# Patient Record
Sex: FEMALE | Race: BLACK OR AFRICAN AMERICAN | NOT HISPANIC OR LATINO | Employment: FULL TIME | URBAN - METROPOLITAN AREA
[De-identification: names, ages, dates, MRNs, and addresses within clinical notes are randomized per-mention and may not be internally consistent; named-entity substitution may affect disease eponyms.]

---

## 2017-03-06 ENCOUNTER — OFFICE VISIT (OUTPATIENT)
Dept: FAMILY MEDICINE | Facility: CLINIC | Age: 32
End: 2017-03-06
Payer: COMMERCIAL

## 2017-03-06 VITALS
HEART RATE: 60 BPM | SYSTOLIC BLOOD PRESSURE: 104 MMHG | OXYGEN SATURATION: 100 % | BODY MASS INDEX: 28.82 KG/M2 | WEIGHT: 168.8 LBS | TEMPERATURE: 98.3 F | DIASTOLIC BLOOD PRESSURE: 63 MMHG | HEIGHT: 64 IN

## 2017-03-06 DIAGNOSIS — Z00.00 ENCOUNTER FOR ROUTINE ADULT HEALTH EXAMINATION WITHOUT ABNORMAL FINDINGS: Primary | ICD-10-CM

## 2017-03-06 LAB
ERYTHROCYTE [DISTWIDTH] IN BLOOD BY AUTOMATED COUNT: 14.2 % (ref 10–15)
HCT VFR BLD AUTO: 38 % (ref 35–47)
HGB BLD-MCNC: 12.4 G/DL (ref 11.7–15.7)
MCH RBC QN AUTO: 29.2 PG (ref 26.5–33)
MCHC RBC AUTO-ENTMCNC: 32.6 G/DL (ref 31.5–36.5)
MCV RBC AUTO: 90 FL (ref 78–100)
PLATELET # BLD AUTO: 219 10E9/L (ref 150–450)
RBC # BLD AUTO: 4.24 10E12/L (ref 3.8–5.2)
TSH SERPL DL<=0.005 MIU/L-ACNC: 1.22 MU/L (ref 0.4–4)
WBC # BLD AUTO: 6.4 10E9/L (ref 4–11)

## 2017-03-06 PROCEDURE — 84443 ASSAY THYROID STIM HORMONE: CPT | Performed by: FAMILY MEDICINE

## 2017-03-06 PROCEDURE — 36415 COLL VENOUS BLD VENIPUNCTURE: CPT | Performed by: FAMILY MEDICINE

## 2017-03-06 PROCEDURE — 85027 COMPLETE CBC AUTOMATED: CPT | Performed by: FAMILY MEDICINE

## 2017-03-06 PROCEDURE — 99395 PREV VISIT EST AGE 18-39: CPT | Performed by: FAMILY MEDICINE

## 2017-03-06 ASSESSMENT — PAIN SCALES - GENERAL: PAINLEVEL: MODERATE PAIN (4)

## 2017-03-06 NOTE — NURSING NOTE
"Chief Complaint   Patient presents with     Physical       Initial /63 (BP Location: Left arm, Patient Position: Chair, Cuff Size: Adult Regular)  Pulse 60  Temp 98.3  F (36.8  C) (Oral)  Ht 5' 3.9\" (1.623 m)  Wt 168 lb 12.8 oz (76.6 kg)  LMP 02/21/2017 (Exact Date)  SpO2 100%  Breastfeeding? No  BMI 29.07 kg/m2 Estimated body mass index is 29.07 kg/(m^2) as calculated from the following:    Height as of this encounter: 5' 3.9\" (1.623 m).    Weight as of this encounter: 168 lb 12.8 oz (76.6 kg).  Medication Reconciliation: complete     COMFORT Howard MA      "

## 2017-03-06 NOTE — MR AVS SNAPSHOT
After Visit Summary   3/6/2017    Ebony Jo    MRN: [id number]           Patient Information     Date Of Birth          1985        Visit Information        Provider Department      3/6/2017 2:00 PM Desire Dumont MD [facility]        Today's Diagnoses     Encounter for routine adult health examination without abnormal findings    -  1      Care Instructions    Based on your medical history and these are the current health maintenance or preventive care services that you are due for (some may have been done at this visit)  Health Maintenance Due   Topic Date Due     MIGRAINE ACTION PLAN,ONE TIME,NO INBASKET  05/04/2001         At [facility], we strive to deliver an exceptional experience to you, every time we see you.    If you receive a survey in the mail, please send us back your thoughts. We really do value your feedback.    Your care team's suggested websites for health information:  Www.[employer] : Up to date and easily searchable information on multiple topics.  Www.medlineplus.gov : medication info, interactive tutorials, watch real surgeries online  Www.familydoctor.org : good info from the Academy of Family Physicians  Www.cdc.gov : public health info, travel advisories, epidemics (H1N1)  Www.aap.org : children's health info, normal development, vaccinations  Www.health.[facility].[place].us : [place] dept of health, public health issues in [place], N1N1    How to contact your care team:   Team Cristina/Mukund [phone]         Pharmacy [phone]    Dr. Huston, Tracee Porras PA-C, Jami Del Cid APRN CNP, Kalyani Alexander PA-C, Dr. Begum, and JULIANNA Delacruz CNP    Team RNs: Xiomara & Starr      Clinic hours  M-Th 7 am-7 pm   Fri 7 am-5 pm.   Urgent care M-F 11 am-9 pm,   Sat/Sun 9 am-5 pm.  Pharmacy M-Th 8 am-8 pm Fri 8 am-6 pm  Sat/Sun 9 am-5 pm.     All password changes, disabled accounts, or ID changes in  MyChart/MyHealth will be done by our Access Services Department.    If you need help with your account or password, call: [phone]. Clinic staff no longer has the ability to change passwords.     Preventive Health Recommendations  Female Ages 26 - 39  Yearly exam:   See your health care provider every year in order to    Review health changes.     Discuss preventive care.      Review your medicines if you your doctor has prescribed any.    Until age 30: Get a Pap test every three years (more often if you have had an abnormal result).    After age 30: Talk to your doctor about whether you should have a Pap test every 3 years or have a Pap test with HPV screening every 5 years.   You do not need a Pap test if your uterus was removed (hysterectomy) and you have not had cancer.  You should be tested each year for STDs (sexually transmitted diseases), if you're at risk.   Talk to your provider about how often to have your cholesterol checked.  If you are at risk for diabetes, you should have a diabetes test (fasting glucose).  Shots: Get a flu shot each year. Get a tetanus shot every 10 years.   Nutrition:     Eat at least 5 servings of fruits and vegetables each day.    Eat whole-grain bread, whole-wheat pasta and brown rice instead of white grains and rice.    Talk to your provider about Calcium and Vitamin D.     Lifestyle    Exercise at least 150 minutes a week (30 minutes a day, 5 days of the week). This will help you control your weight and prevent disease.    Limit alcohol to one drink per day.    No smoking.     Wear sunscreen to prevent skin cancer.    See your dentist every six months for an exam and cleaning.          Follow-ups after your visit        Your next 10 appointments already scheduled     Apr 05, 2017 11:00 AM CDT   Office Visit with Floyd Corcoran MD   [facility] ([facility]    [address] [address] [place] [id number] [phone]            "Bring a current list of meds and any records pertaining to this visit.  For Physicals, please bring immunization records and any forms needing to be filled out.  Please arrive 10 minutes early to complete paperwork.              Who to contact     If you have questions or need follow up information about today's clinic visit or your schedule please contact [facility] directly at [phone].  Normal or non-critical lab and imaging results will be communicated to you by [employer], letter or phone within 4 business days after the clinic has received the results. If you do not hear from us within 7 days, please contact the clinic through [employer] or phone. If you have a critical or abnormal lab result, we will notify you by phone as soon as possible.  Submit refill requests through [employer] or call your pharmacy and they will forward the refill request to us. Please allow 3 business days for your refill to be completed.          Additional Information About Your Visit        [employer] Information     [employer] gives you secure access to your electronic health record. If you see a primary care provider, you can also send messages to your care team and make appointments. If you have questions, please call your primary care clinic.  If you do not have a primary care provider, please call [phone] and they will assist you.        Care EveryWhere ID     This is your Care EveryWhere ID. This could be used by other organizations to access your [facility] medical records  [id number]        Your Vitals Were     Pulse Temperature Height Last Period Pulse Oximetry Breastfeeding?    60 98.3  F (36.8  C) (Oral) 5' 3.9\" (1.623 m) 02/21/2017 (Exact Date) 100% No    BMI (Body Mass Index)                   29.07 kg/m2            Blood Pressure from Last 3 Encounters:   03/06/17 104/63   11/08/16 105/67   08/05/16 112/73    Weight from Last 3 Encounters:   03/06/17 168 lb 12.8 oz (76.6 kg)   11/08/16 173 lb 6.4 oz (78.7 " kg)   08/05/16 170 lb (77.1 kg)              We Performed the Following     CBC with platelets     TSH with free T4 reflex        Primary Care Provider Office Phone # Fax #    Desire Elis Waldron [phone]       [facility] [address]  [facility] [address]        Thank you!     Thank you for choosing [facility]  for your care. Our goal is always to provide you with excellent care. Hearing back from our patients is one way we can continue to improve our services. Please take a few minutes to complete the written survey that you may receive in the mail after your visit with us. Thank you!             Your Updated Medication List - Protect others around you: Learn how to safely use, store and throw away your medicines at www.disposemymeds.org.          This list is accurate as of: 3/6/17  3:44 PM.  Always use your most recent med list.                   Brand Name Dispense Instructions for use    ADVIL PO      Reported on 3/6/2017

## 2017-03-06 NOTE — PATIENT INSTRUCTIONS
Based on your medical history and these are the current health maintenance or preventive care services that you are due for (some may have been done at this visit)  Health Maintenance Due   Topic Date Due     MIGRAINE ACTION PLAN,ONE TIME,NO INBASKET  05/04/2001         At [facility], we strive to deliver an exceptional experience to you, every time we see you.    If you receive a survey in the mail, please send us back your thoughts. We really do value your feedback.    Your care team's suggested websites for health information:  Www.[facility].org : Up to date and easily searchable information on multiple topics.  Www.medlineplus.gov : medication info, interactive tutorials, watch real surgeries online  Www.familydoctor.org : good info from the Academy of Family Physicians  Www.cdc.gov : public health info, travel advisories, epidemics (H1N1)  Www.aap.org : children's health info, normal development, vaccinations  Www.health.[facility].[place].us : [place] dept of health, public health issues in [place], N1N1    How to contact your care team:   Team Cristina/Spirit [phone]         Pharmacy [phone]    Dr. Huston, Tracee Porras PA-C, Dr. Lua, Jami LEVINE CNP, Kalyani Alexander PA-C, Dr. Begum, and JULIANNA Delacruz CNP    Team RNs: Xiomara & Starr      Clinic hours  M-Th 7 am-7 pm   Fri 7 am-5 pm.   Urgent care M-F 11 am-9 pm,   Sat/Sun 9 am-5 pm.  Pharmacy M-Th 8 am-8 pm Fri 8 am-6 pm  Sat/Sun 9 am-5 pm.     All password changes, disabled accounts, or ID changes in [employer]/MyHealth will be done by our Access Services Department.    If you need help with your account or password, call: [phone]. Clinic staff no longer has the ability to change passwords.     Preventive Health Recommendations  Female Ages 26 - 39  Yearly exam:   See your health care provider every year in order to    Review health changes.     Discuss preventive care.      Review your medicines if you your doctor  has prescribed any.    Until age 30: Get a Pap test every three years (more often if you have had an abnormal result).    After age 30: Talk to your doctor about whether you should have a Pap test every 3 years or have a Pap test with HPV screening every 5 years.   You do not need a Pap test if your uterus was removed (hysterectomy) and you have not had cancer.  You should be tested each year for STDs (sexually transmitted diseases), if you're at risk.   Talk to your provider about how often to have your cholesterol checked.  If you are at risk for diabetes, you should have a diabetes test (fasting glucose).  Shots: Get a flu shot each year. Get a tetanus shot every 10 years.   Nutrition:     Eat at least 5 servings of fruits and vegetables each day.    Eat whole-grain bread, whole-wheat pasta and brown rice instead of white grains and rice.    Talk to your provider about Calcium and Vitamin D.     Lifestyle    Exercise at least 150 minutes a week (30 minutes a day, 5 days of the week). This will help you control your weight and prevent disease.    Limit alcohol to one drink per day.    No smoking.     Wear sunscreen to prevent skin cancer.    See your dentist every six months for an exam and cleaning.

## 2017-03-06 NOTE — PROGRESS NOTES
SUBJECTIVE:     CC: Ebony Jo is an 31 year old woman who presents for preventive health visit.     Answers for HPI/ROS submitted by the patient on 3/6/2017   Annual Exam:  Getting at least 3 servings of Calcium per day:: Yes  Bi-annual eye exam:: NO  Dental care twice a year:: NO  Sleep apnea or symptoms of sleep apnea:: None  Diet:: Regular (no restrictions)  Frequency of exercise:: 1 day/week  Taking medications regularly:: Yes  Medication side effects:: None  Additional concerns today:: No  Q1: Little interest or pleasure in doing things: 1=Several days  Q2: Feeling down, depressed or hopeless: 3=Nearly every day  PHQ-2 Score: 4  Duration of exercise:: 15-30 minutes    Healthy Habits:    Do you get at least three servings of calcium containing foods daily (dairy, green leafy vegetables, etc.)? yes    Amount of exercise or daily activities, outside of work: 1 day(s) per week    Problems taking medications regularly No    Medication side effects: No    Have you had an eye exam in the past two years? no    Do you see a dentist twice per year? no    Do you have sleep apnea, excessive snoring or daytime drowsiness?no    Body aches with low grade temps off and on for 1 week.  Exercises once weekly.  Having some stress and headaches. Going through a divorce as week.    Skin concerns.    Today's PHQ-2 Score:   PHQ-2 ( 1999 Pfizer) 3/6/2017 1/27/2016   Q1: Little interest or pleasure in doing things - 0   Q2: Feeling down, depressed or hopeless - 0   PHQ-2 Score - 0   Little interest or pleasure in doing things Several days -   Feeling down, depressed or hopeless Nearly every day -   PHQ-2 Score 4 -       Abuse: Current or Past(Physical, Sexual or Emotional)- No  Do you feel safe in your environment - Yes    Social History   Substance Use Topics     Smoking status: Never Smoker     Smokeless tobacco: Never Used     Alcohol use 0.0 oz/week     0 Standard drinks or equivalent per week      Comment:  0-2  wine  glasses  "monthly     The patient does not drink >3 drinks per day nor >7 drinks per week.    Recent Labs   Lab Test  01/27/16   1750  10/14/11   0922   CHOL   --   119   HDL   --   48*   LDL  58  55   TRIG   --   76   CHOLHDLRATIO   --   2.5       Reviewed orders with patient.  Reviewed health maintenance and updated orders accordingly - Yes    Mammo Decision Support:  Mammogram not appropriate for this patient based on age.    Pertinent mammograms are reviewed under the imaging tab.  History of abnormal Pap smear: NO - age 30- 65 PAP every 3 years recommended    Reviewed and updated as needed this visit by clinical staff  Tobacco  Allergies  Meds  Soc Hx        Reviewed and updated as needed this visit by Provider            ROS:  C: NEGATIVE for fever, chills, change in weight  I: NEGATIVE for worrisome rashes, moles or lesions  E: NEGATIVE for vision changes or irritation  ENT: NEGATIVE for ear, mouth and throat problems  R: NEGATIVE for significant cough or SOB  B: NEGATIVE for masses, tenderness or discharge  CV: NEGATIVE for chest pain, palpitations or peripheral edema  GI: NEGATIVE for nausea, abdominal pain, heartburn, or change in bowel habits  : NEGATIVE for unusual urinary or vaginal symptoms. Periods are regular.  M: NEGATIVE for significant arthralgias or myalgia  N: NEGATIVE for weakness, dizziness or paresthesias  P: NEGATIVE for changes in mood or affect    Problem list, Medication list, Allergies, and Medical/Social/Surgical histories reviewed in EPIC and updated as appropriate.  OBJECTIVE:     /63 (BP Location: Left arm, Patient Position: Chair, Cuff Size: Adult Regular)  Pulse 60  Temp 98.3  F (36.8  C) (Oral)  Ht 5' 3.9\" (1.623 m)  Wt 168 lb 12.8 oz (76.6 kg)  LMP 02/21/2017 (Exact Date)  SpO2 100%  Breastfeeding? No  BMI 29.07 kg/m2  EXAM:  GENERAL: healthy, alert and no distress  EYES: Eyes grossly normal to inspection, PERRL and conjunctivae and sclerae normal  HENT: ear canals and TM's " "normal, nose and mouth without ulcers or lesions  NECK: no adenopathy, no asymmetry, masses, or scars and thyroid normal to palpation  RESP: lungs clear to auscultation - no rales, rhonchi or wheezes  BREAST: normal without masses, tenderness or nipple discharge and no palpable axillary masses or adenopathy  CV: regular rate and rhythm, normal S1 S2, no S3 or S4, no murmur, click or rub, no peripheral edema and peripheral pulses strong  ABDOMEN: soft, nontender, no hepatosplenomegaly, no masses and bowel sounds normal  MS: no gross musculoskeletal defects noted, no edema  SKIN: no suspicious lesions or rashes  NEURO: Normal strength and tone, mentation intact and speech normal  PSYCH: mentation appears normal, affect normal/bright    ASSESSMENT/PLAN:     1. Encounter for routine adult health examination without abnormal findings  Routine preventive discussed - recommended stress reduction, increased exercise and commitment to take better care of herself.  - CBC with platelets  - TSH with free T4 reflex    COUNSELING:   Reviewed preventive health counseling, as reflected in patient instructions         reports that she has never smoked. She has never used smokeless tobacco.    Estimated body mass index is 29.07 kg/(m^2) as calculated from the following:    Height as of this encounter: 5' 3.9\" (1.623 m).    Weight as of this encounter: 168 lb 12.8 oz (76.6 kg).   Weight management plan: Discussed healthy diet and exercise guidelines and patient will follow up in 12 months in clinic to re-evaluate.    Counseling Resources:  ATP IV Guidelines  Pooled Cohorts Equation Calculator  Breast Cancer Risk Calculator  FRAX Risk Assessment  ICSI Preventive Guidelines  Dietary Guidelines for Americans, 2010  USDA's MyPlate  ASA Prophylaxis  Lung CA Screening    Desire Waldron MD  [facility]      "

## 2017-03-07 NOTE — PROGRESS NOTES
Ms. Jo,    All of your labs were normal for you.    Please contact the clinic if you have additional questions.  Thank you.    Sincerely,    Desire Waldron

## 2017-04-21 ENCOUNTER — OFFICE VISIT (OUTPATIENT)
Dept: URGENT CARE | Facility: URGENT CARE | Age: 32
End: 2017-04-21
Payer: COMMERCIAL

## 2017-04-21 VITALS
SYSTOLIC BLOOD PRESSURE: 97 MMHG | DIASTOLIC BLOOD PRESSURE: 66 MMHG | HEART RATE: 62 BPM | TEMPERATURE: 97.7 F | OXYGEN SATURATION: 100 % | BODY MASS INDEX: 28.76 KG/M2 | WEIGHT: 167 LBS

## 2017-04-21 DIAGNOSIS — M62.81 GENERALIZED MUSCLE WEAKNESS: ICD-10-CM

## 2017-04-21 DIAGNOSIS — R42 DIZZINESS: Primary | ICD-10-CM

## 2017-04-21 PROCEDURE — 99213 OFFICE O/P EST LOW 20 MIN: CPT | Performed by: NURSE PRACTITIONER

## 2017-04-21 NOTE — PROGRESS NOTES
SUBJECTIVE:                                                    Ebony Jo is a 31 year old female who presents to clinic today for the following health issues:    Dizziness      Duration: 1-2 weeks    Description   Feeling faint:  Not right not, but when she stands up she feels more faint.   Feeling like the surroundings are moving: no     Intensity:  moderate    Accompanying signs and symptoms:  Pt also has body aches and feels so weak and fatigued that all she wants to do is sleep. She has had to steady herself to walk at times.   Nausea/vomitting: no   Palpitations: YES at times but not currently.   Weakness in arms or legs: YES  Vision or speech changes: no   Ringing in ears (Tinnitus): no   Hearing loss related to dizziness: no   Other (fevers/chills/sweating/dyspnea) Feels feverish.     History (similar episodes/head trauma/previous evaluation/recent bleeding): None    Precipitating or alleviating factors (new meds/chemicals): None  Worse with activity/head movement: YES    Therapies tried and outcome: None       No Known Allergies    Past Medical History:   Diagnosis Date     Hemifacial spasm, right 9/26/2013     Malaria      Migraine without aura 9/26/2013     Seasonal allergies          Current Outpatient Prescriptions on File Prior to Visit:  Ibuprofen (ADVIL PO) Reported on 3/6/2017     No current facility-administered medications on file prior to visit.     Social History   Substance Use Topics     Smoking status: Never Smoker     Smokeless tobacco: Never Used     Alcohol use 0.0 oz/week     0 Standard drinks or equivalent per week      Comment:  0-2  wine  glasses monthly       ROS:  GEN no fevers  SKIN as above  Musculoskel: + as above    OBJECTIVE:  BP 97/66 (BP Location: Left arm, Patient Position: Chair, Cuff Size: Adult Regular)  Pulse 62  Temp 97.7  F (36.5  C) (Oral)  Wt 167 lb (75.8 kg)  SpO2 100%  Breastfeeding? No  BMI 28.76 kg/m2   General:   awake, alert, and cooperative.  NAD.    Head: Normocephalic, atraumatic.  .resp: Air entry equal to all lung fields. Normal breath sounds  Heart: Heart sounds normal, no murmurs, clicks or any other abnormal sounds.   Eyes: Conjunctiva clear,   MS:  Both passive and active ROM intact. Pulses and sensation intact in all extremities  Neuro: Alert and oriented - normal speech.    ASSESSMENT:    ICD-10-CM    1. Dizziness R42    2. Generalized muscle weakness M62.81      Patient had a physical 1 month ago. All labs were unremarkable. Her menstrual period has not changes, she reports normal flow of 3 days.  Advised on healthy diet and exercise, keep a healthy weight. Follow up with PCP if dizziness is persisting.  The patient indicates understanding of these issues and agrees with the plan.  Sarah Antoine  [facility]  Family Nurse Practitoner

## 2017-04-21 NOTE — MR AVS SNAPSHOT
After Visit Summary   4/21/2017    Ebony Jo    MRN: [id number]           Patient Information     Date Of Birth          1985        Visit Information        Provider Department      4/21/2017 11:15 AM Sarah Antoine NP [facility]        Today's Diagnoses     Dizziness    -  1    Generalized muscle weakness           Follow-ups after your visit        Follow-up notes from your care team     Return if symptoms worsen or fail to improve.      Who to contact     If you have questions or need follow up information about today's clinic visit or your schedule please contact [facility] directly at [phone].  Normal or non-critical lab and imaging results will be communicated to you by [employer], letter or phone within 4 business days after the clinic has received the results. If you do not hear from us within 7 days, please contact the clinic through [employer] or phone. If you have a critical or abnormal lab result, we will notify you by phone as soon as possible.  Submit refill requests through [employer] or call your pharmacy and they will forward the refill request to us. Please allow 3 business days for your refill to be completed.          Additional Information About Your Visit        MyChart Information     [employer] gives you secure access to your electronic health record. If you see a primary care provider, you can also send messages to your care team and make appointments. If you have questions, please call your primary care clinic.  If you do not have a primary care provider, please call [phone] and they will assist you.        Care EveryWhere ID     This is your Care EveryWhere ID. This could be used by other organizations to access your [facility] medical records  [id number]        Your Vitals Were     Pulse Temperature Pulse Oximetry Breastfeeding? BMI (Body Mass Index)       62 97.7  F (36.5  C) (Oral) 100% No 28.76 kg/m2        Blood Pressure from  Last 3 Encounters:   04/21/17 97/66   03/06/17 104/63   11/08/16 105/67    Weight from Last 3 Encounters:   04/21/17 167 lb (75.8 kg)   03/06/17 168 lb 12.8 oz (76.6 kg)   11/08/16 173 lb 6.4 oz (78.7 kg)              Today, you had the following     No orders found for display       Primary Care Provider Office Phone # Fax #    Desire Eliswilliam Waldron [phone]       [facility] [address] [place]  [facility] [address]        Thank you!     Thank you for choosing [facility]  for your care. Our goal is always to provide you with excellent care. Hearing back from our patients is one way we can continue to improve our services. Please take a few minutes to complete the written survey that you may receive in the mail after your visit with us. Thank you!             Your Updated Medication List - Protect others around you: Learn how to safely use, store and throw away your medicines at www.disposemymeds.org.          This list is accurate as of: 4/21/17  1:01 PM.  Always use your most recent med list.                   Brand Name Dispense Instructions for use    ADVIL PO      Reported on 3/6/2017

## 2017-04-21 NOTE — NURSING NOTE
"Chief Complaint   Patient presents with     Dizziness     Pt c/o dizziness episodes that have been going on for the past 1-2 weeks.        Initial BP 97/66 (BP Location: Left arm, Patient Position: Chair, Cuff Size: Adult Regular)  Pulse 62  Temp 97.7  F (36.5  C) (Oral)  Wt 167 lb (75.8 kg)  SpO2 100%  Breastfeeding? No  BMI 28.76 kg/m2 Estimated body mass index is 28.76 kg/(m^2) as calculated from the following:    Height as of 3/6/17: 5' 3.9\" (1.623 m).    Weight as of this encounter: 167 lb (75.8 kg).  Medication Reconciliation: complete     Sangeetha Merritt CMA (AAMA)      "

## 2017-09-27 ENCOUNTER — OFFICE VISIT (OUTPATIENT)
Dept: OBGYN | Facility: CLINIC | Age: 32
End: 2017-09-27
Payer: COMMERCIAL

## 2017-09-27 VITALS
DIASTOLIC BLOOD PRESSURE: 63 MMHG | SYSTOLIC BLOOD PRESSURE: 99 MMHG | TEMPERATURE: 98.3 F | OXYGEN SATURATION: 100 % | HEART RATE: 64 BPM | WEIGHT: 167.1 LBS | BODY MASS INDEX: 28.77 KG/M2

## 2017-09-27 DIAGNOSIS — R10.2 FEMALE PELVIC PAIN: Primary | ICD-10-CM

## 2017-09-27 DIAGNOSIS — N92.6 IRREGULAR MENSES: ICD-10-CM

## 2017-09-27 PROCEDURE — 99204 OFFICE O/P NEW MOD 45 MIN: CPT | Performed by: OBSTETRICS & GYNECOLOGY

## 2017-09-27 RX ORDER — PRENATAL VIT/IRON FUM/FOLIC AC 27MG-0.8MG
1 TABLET ORAL DAILY
COMMUNITY
End: 2018-05-31

## 2017-09-27 NOTE — NURSING NOTE
"Chief Complaint   Patient presents with     Consult     Abdominal Pain       Initial BP 99/63  Pulse 64  Temp 98.3  F (36.8  C) (Oral)  Wt 75.8 kg (167 lb 1.6 oz)  LMP 09/26/2017  SpO2 100%  BMI 28.77 kg/m2 Estimated body mass index is 28.77 kg/(m^2) as calculated from the following:    Height as of 3/6/17: 1.623 m (5' 3.9\").    Weight as of this encounter: 75.8 kg (167 lb 1.6 oz).  Medication Reconciliation: complete   Shari Caldera CMA      "

## 2017-09-27 NOTE — MR AVS SNAPSHOT
After Visit Summary   9/27/2017    Ebony Jo    MRN: [id number]           Patient Information     Date Of Birth          1985        Visit Information        Provider Department      9/27/2017 2:00 PM Floyd Corcoran MD [facility]        Today's Diagnoses     Female pelvic pain    -  1      Care Instructions                                                         If you have any questions regarding your visit, Please contact your care team.    Women s Health CLINIC HOURS TELEPHONE NUMBER   MD Shari Piper CMA Lisa -    MATTHEW Reyna RN       Monday:       7:30-4:30 [place]  Wednesday:       7:30-4:30 [place]  Thursday:       7:30-1:30 [place]  Friday:       7:30-11:30 [facility]  [address] [facility] [place], [place]  [id number] [phone] ask for [facility]  [address] [place] [address]  [phone] ask for [facility]    Imaging Scheduling for [place]:  [phone]    Imaging Scheduling for [place]: [phone]       Urgent Care locations:    [facility] Saturday and Sunday   9 am - 5 pm    Monday-Friday   12 pm - 8 pm  Saturday and Sunday   9 am - 5 pm   [phone]     [facility] Labor and Delivery:  [phone]    If you need a medication refill, please contact your pharmacy. Please allow 3 business days for your refill to be completed.  As always, Thank you for trusting us with your healthcare needs!              Follow-ups after your visit        Future tests that were ordered for you today     Open Future Orders        Priority Expected Expires Ordered    US Pelvic Complete with Transvaginal ASAP 9/27/2017 11/26/2017 9/27/2017    Estradiol Routine 9/27/2017 11/30/2017 9/27/2017    Lutropin Routine 9/27/2017 11/30/2017 9/27/2017    Prolactin Routine 9/27/2017 11/30/2017 9/27/2017    Testosterone  Free and Total Routine 9/27/2017 11/30/2017 9/27/2017    TSH with free T4 reflex Routine 9/27/2017 11/30/2017 9/27/2017            Who to contact     If you have questions or need follow up information about today's clinic visit or your schedule please contact [facility] directly at [phone].  Normal or non-critical lab and imaging results will be communicated to you by MyChart, letter or phone within 4 business days after the clinic has received the results. If you do not hear from us within 7 days, please contact the clinic through [employer] or phone. If you have a critical or abnormal lab result, we will notify you by phone as soon as possible.  Submit refill requests through [employer] or call your pharmacy and they will forward the refill request to us. Please allow 3 business days for your refill to be completed.          Additional Information About Your Visit        [employer] Information     [employer] gives you secure access to your electronic health record. If you see a primary care provider, you can also send messages to your care team and make appointments. If you have questions, please call your primary care clinic.  If you do not have a primary care provider, please call [phone] and they will assist you.        Care EveryWhere ID     This is your Care EveryWhere ID. This could be used by other organizations to access your [place] medical records  [id number]        Your Vitals Were     Pulse Temperature Last Period Pulse Oximetry BMI (Body Mass Index)       64 98.3  F (36.8  C) (Oral) 09/26/2017 100% 28.77 kg/m2        Blood Pressure from Last 3 Encounters:   09/27/17 99/63   04/21/17 97/66   03/06/17 104/63    Weight from Last 3 Encounters:   09/27/17 75.8 kg (167 lb 1.6 oz)   04/21/17 75.8 kg (167 lb)   03/06/17 76.6 kg (168 lb 12.8 oz)               Primary Care Provider Office Phone # Fax #    Desire Elis Waldron [phone]       [address]  [place] [address]        Equal Access to Services     BRIDGET BARTLETT : Hadii aad ku hadreillyswapna Vasquez, waisaacda jonathan, qaamycori garciamahenrry lr, agapito cabavilmaservando joshi. So [facility] [phone].    ATENCIÓN: Si habla español, tiene a french disposición servicios gratuitos de asistencia lingüística. Llame al [phone].    We comply with applicable federal civil rights laws and [place] laws. We do not discriminate on the basis of race, color, national origin, age, disability sex, sexual orientation or gender identity.            Thank you!     Thank you for choosing [facility]  for your care. Our goal is always to provide you with excellent care. Hearing back from our patients is one way we can continue to improve our services. Please take a few minutes to complete the written survey that you may receive in the mail after your visit with us. Thank you!             Your Updated Medication List - Protect others around you: Learn how to safely use, store and throw away your medicines at www.disposemymeds.org.          This list is accurate as of: 9/27/17  2:48 PM.  Always use your most recent med list.                   Brand Name Dispense Instructions for use Diagnosis    ADVIL PO      Reported on 3/6/2017        prenatal multivitamin plus iron 27-0.8 MG Tabs per tablet      Take 1 tablet by mouth daily

## 2017-09-27 NOTE — PATIENT INSTRUCTIONS
If you have any questions regarding your visit, Please contact your care team.    Women s Health CLINIC HOURS TELEPHONE NUMBER   MD Shari Piper CMA Lisa -    MATTHEW Reyna RN       Monday:       7:30-4:30 [place]  Wednesday:       7:30-4:30 [place]  Thursday:       7:30-1:30 [place]  Friday:       7:30-11:30 [facility]  [address] [place]  [address]  [phone] ask for [facility]  [address] [place] [address]  [phone] ask for [facility]    Imaging Scheduling for [place]:  [phone]    Imaging Scheduling for [place]: [phone]       Urgent Care locations:    [facility] Saturday and Sunday   9 am - 5 pm    Monday-Friday   12 pm - 8 pm  Saturday and Sunday   9 am - 5 pm   [phone]     [facility] Labor and Delivery:  [phone]    If you need a medication refill, please contact your pharmacy. Please allow 3 business days for your refill to be completed.  As always, Thank you for trusting us with your healthcare needs!

## 2017-09-27 NOTE — PROGRESS NOTES
Ebony is a 32 year old   is here today complaining of RLQ pain.  This has been a problem many years.  The pain occurs every couple of months, lasting for a few days each time.  The pain is cramping.  While her cycles are regular, the pain isn't cyclic or consistent to a time of her cycle.  She reports normal bowel/bladder function.  Denies pain or bleeding with intercourse.   She does want to conceive.  No urinary frequency or dysuria, bladder or kidney problems, Negative for painful menses, irregular menses, heavy periods, vaginal discharge, vaginal itching or burning, dysparunia  She also reports irregularity in her cycle.  ROS: Ten point review of systems was reviewed and negative except the above.    Gyn Hx:      Past Medical History:   Diagnosis Date     Hemifacial spasm, right 2013     Malaria      Migraine without aura 2013     Seasonal allergies      Past Surgical History:   Procedure Laterality Date     APPENDECTOMY       Patient Active Problem List   Diagnosis     CARDIOVASCULAR SCREENING; LDL GOAL LESS THAN 160     Migraine without aura     Hemifacial spasm, right       ALL/Meds: Her medication and allergy histories were reviewed and are documented in their appropriate chart areas.    SH: Reviewed and documented in the appropriate area of the chart.  FH:  Her family history is reviewed and updated in the chart, today.  PMH: Her past medical, surgical, and obstetric histories were reviewed and updated today in the appropriate chart areas.    PE: BP 99/63  Pulse 64  Temp 98.3  F (36.8  C) (Oral)  Wt 75.8 kg (167 lb 1.6 oz)  LMP 2017  SpO2 100%  BMI 28.77 kg/m2  Body mass index is 28.77 kg/(m^2).    General Appearance:  healthy, alert, active, no distress  Cardiovascular:  Regular rate and Rhythm  Neck: Supple, no adenopathy and thyroid normal  Lungs:  Clear, without wheeze, rale or rhonchi  Breast: normal breast exam  Abdomen: Benign, Soft, flat, non-tender, No masses,  organomegaly, No inguinal nodes and Bowel sounds normoactive.   Pelvic:       - Ext: Vulva and perineum are normal without lesion, mass or discharge        - Urethra: normal without discharge or scarring no hypermobility       - Urethral Meatus: normal appearance,        - Bladder: no tenderness, no masses       - Vagina:  without discharge and rugated       - Cervix: normal       - Uterus:Normal shape, position and consistency       - Adnexa: Normal without masses or tenderness       - Rectal: deferred    I don't think this pain is gynecologic.  It is most likely related to scar tissue from her previous appendectomy.  We will get an ultrasound to assess the adnexa better.      A/P:(R10.2) Female pelvic pain  (primary encounter diagnosis)    Plan: US Pelvic Complete with Transvaginal,         Estradiol, Lutropin, Prolactin, Testosterone         Free and Total, TSH with free T4 reflex        20 minutes was spent face to face with the patient today discussing her history, diagnosis, and follow-up plan as noted above.  Over 50% of the visit was spent in counseling and coordination of care.    Total Visit Time: 30 minutes.      She will follow up after the testing is done.  At that time, we can discuss what options she has to help her conceive.   - No orders of the defined types were placed in this encounter.

## 2017-10-03 ENCOUNTER — RADIANT APPOINTMENT (OUTPATIENT)
Dept: ULTRASOUND IMAGING | Facility: CLINIC | Age: 32
End: 2017-10-03
Attending: OBSTETRICS & GYNECOLOGY
Payer: COMMERCIAL

## 2017-10-03 DIAGNOSIS — R10.2 FEMALE PELVIC PAIN: ICD-10-CM

## 2017-10-03 PROCEDURE — 76830 TRANSVAGINAL US NON-OB: CPT | Performed by: STUDENT IN AN ORGANIZED HEALTH CARE EDUCATION/TRAINING PROGRAM

## 2017-10-03 PROCEDURE — 76856 US EXAM PELVIC COMPLETE: CPT | Performed by: STUDENT IN AN ORGANIZED HEALTH CARE EDUCATION/TRAINING PROGRAM

## 2017-12-11 ENCOUNTER — OFFICE VISIT (OUTPATIENT)
Dept: FAMILY MEDICINE | Facility: CLINIC | Age: 32
End: 2017-12-11
Payer: COMMERCIAL

## 2017-12-11 VITALS
OXYGEN SATURATION: 99 % | DIASTOLIC BLOOD PRESSURE: 72 MMHG | BODY MASS INDEX: 28.54 KG/M2 | TEMPERATURE: 98 F | WEIGHT: 167.2 LBS | HEART RATE: 79 BPM | HEIGHT: 64 IN | SYSTOLIC BLOOD PRESSURE: 119 MMHG

## 2017-12-11 DIAGNOSIS — N92.6 MISSED PERIOD: ICD-10-CM

## 2017-12-11 DIAGNOSIS — Z32.01 PREGNANCY TEST POSITIVE: Primary | ICD-10-CM

## 2017-12-11 LAB — BETA HCG QUAL IFA URINE: POSITIVE

## 2017-12-11 PROCEDURE — 99213 OFFICE O/P EST LOW 20 MIN: CPT | Performed by: FAMILY MEDICINE

## 2017-12-11 PROCEDURE — 84703 CHORIONIC GONADOTROPIN ASSAY: CPT | Performed by: FAMILY MEDICINE

## 2017-12-11 NOTE — PATIENT INSTRUCTIONS
At [facility], we strive to deliver an exceptional experience to you, every time we see you.  If you receive a survey in the mail, please send us back your thoughts. We really do value your feedback.    Based on your medical history, these are the current health maintenance/preventive care services that you are due for (some may have been done at this visit.)  Health Maintenance Due   Topic Date Due     MIGRAINE ACTION PLAN  05/04/2001     INFLUENZA VACCINE (SYSTEM ASSIGNED)  09/01/2017         Suggested websites for health information:  Www.ECOtality.[place]. com. cn : Up to date and easily searchable information on multiple topics.  Www.LeadiD.gov : medication info, interactive tutorials, watch real surgeries online  Www.familydoctor.org : good info from the Academy of Family Physicians  Www.cdc.gov : public health info, travel advisories, epidemics (H1N1)  Www.aap.org : children's health info, normal development, vaccinations  Www.health.[facility].[place].us : [place] dept of health, public health issues in [place], N1N1    Your care team:                            Family Medicine Internal Medicine   MD Tony Stovall MD Shantel Branch-Fleming, MD Katya Georgiev PA-C Nam Ho, MD Pediatrics   GLADYS Cortes, MD Tamiko Kang CNP, MD Deborah Mielke, MD Kim Thein, APRN CNP      Clinic hours: Monday - Thursday 7 am-7 pm; Fridays 7 am-5 pm.   Urgent care: Monday - Friday 11 am-9 pm; Saturday and Sunday 9 am-5 pm.  Pharmacy : Monday -Thursday 8 am-8 pm; Friday 8 am-6 pm; Saturday and Sunday 9 am-5 pm.     Clinic: [phone]   Pharmacy: [phone]

## 2017-12-11 NOTE — PROGRESS NOTES
"  SUBJECTIVE:   Ebony Jo is a 32 year old female who presents to clinic today for the following health issues:      Confirm Pregnancy: LMP 11/11/17    This is planned.  She has other children.  Currently taking PNV with DHA.    Problem list and histories reviewed & adjusted, as indicated.  Additional history: as documented    Patient Active Problem List   Diagnosis     CARDIOVASCULAR SCREENING; LDL GOAL LESS THAN 160     Migraine without aura     Hemifacial spasm, right     Past Surgical History:   Procedure Laterality Date     APPENDECTOMY         Social History   Substance Use Topics     Smoking status: Never Smoker     Smokeless tobacco: Never Used     Alcohol use 0.0 oz/week     0 Standard drinks or equivalent per week      Comment:  0-2  wine  glasses monthly     Family History   Problem Relation Age of Onset     Family History Negative Mother      Family History Negative Father      Alzheimer Disease Father              Reviewed and updated as needed this visit by clinical staffTobacco  Allergies  Meds  Problems       Reviewed and updated as needed this visit by Provider  Allergies  Meds  Problems         ROS:  Constitutional, HEENT, cardiovascular, pulmonary, gi and gu systems are negative, except as otherwise noted.      OBJECTIVE:   /72 (BP Location: Left arm, Patient Position: Chair, Cuff Size: Adult Regular)  Pulse 79  Temp 98  F (36.7  C) (Oral)  Ht 5' 3.9\" (1.623 m)  Wt 167 lb 3.2 oz (75.8 kg)  LMP 11/11/2017  SpO2 99%  Breastfeeding? No  BMI 28.79 kg/m2  Body mass index is 28.79 kg/(m^2).  GENERAL: healthy, alert and no distress  SKIN: no suspicious lesions or rashes  NEURO: Normal strength and tone, mentation intact and speech normal  PSYCH: mentation appears normal, affect normal/bright    Diagnostic Test Results:  Results for orders placed or performed in visit on 12/11/17 (from the past 24 hour(s))   Beta HCG qual IFA urine - [facility] and [place]   Result Value Ref Range    " Beta HCG Qual IFA Urine Positive (A) NEG^Negative          ASSESSMENT/PLAN:     1. Missed period    - Beta HCG qual IFA urine - [facility] and [place]    2. Pregnancy test positive  Discussed what to avoid and referral to OB about 10 - 12 weeks gestation  - OB/GYN REFERRAL    Desire Waldron MD  [facility]

## 2017-12-11 NOTE — NURSING NOTE
"Chief Complaint   Patient presents with     Confirmation Of Pregnancy     Pt did home test yesterday and it was positive.       Initial /72 (BP Location: Left arm, Patient Position: Chair, Cuff Size: Adult Regular)  Pulse 79  Temp 98  F (36.7  C) (Oral)  Ht 5' 3.9\" (1.623 m)  Wt 167 lb 3.2 oz (75.8 kg)  LMP 11/11/2017  SpO2 99%  Breastfeeding? No  BMI 28.79 kg/m2 Estimated body mass index is 28.79 kg/(m^2) as calculated from the following:    Height as of this encounter: 5' 3.9\" (1.623 m).    Weight as of this encounter: 167 lb 3.2 oz (75.8 kg).  Medication Reconciliation: complete   Loreto Moore MA        "

## 2017-12-11 NOTE — MR AVS SNAPSHOT
After Visit Summary   12/11/2017    Ebony Jo    MRN: [id number]           Patient Information     Date Of Birth          1985        Visit Information        Provider Department      12/11/2017 1:00 PM Desire Dumont MD [facility]        Today's Diagnoses     Pregnancy test positive    -  1    Missed period          Care Instructions    At [facility], we strive to deliver an exceptional experience to you, every time we see you.  If you receive a survey in the mail, please send us back your thoughts. We really do value your feedback.    Based on your medical history, these are the current health maintenance/preventive care services that you are due for (some may have been done at this visit.)  Health Maintenance Due   Topic Date Due     MIGRAINE ACTION PLAN  05/04/2001     INFLUENZA VACCINE (SYSTEM ASSIGNED)  09/01/2017         Suggested websites for health information:  WwwSweet Cred : Up to date and easily searchable information on multiple topics.  Www.Rob[place].gov : medication info, interactive tutorials, watch real surgeries online  Www.familydoctor.org : good info from the Academy of Family Physicians  Www.cdc.gov : public health info, travel advisories, epidemics (H1N1)  Www.aap.org : children's health info, normal development, vaccinations  Www.health.state.[place].us : [place] dept of health, public health issues in [place], N1N1    Your care team:                            Family Medicine Internal Medicine   MD Tony Stovall MD Shantel Branch-Fleming, MD Katya Georgiev PA-C Nam Ho, MD Pediatrics   GLADYS Cortes, SAVANA Wise APRN MD Tamiko Marley MD Deborah Mielke, MD Kim Thein, JULIANNA CNP      Clinic hours: Monday - Thursday 7 am-7 pm; Fridays 7 am-5 pm.   Urgent care: Monday - Friday 11 am-9 pm; Saturday and Sunday 9 am-5 pm.  Pharmacy : Monday -Thursday 8  am-8 pm; Friday 8 am-6 pm; Saturday and Sunday 9 am-5 pm.     Clinic: [phone]   Pharmacy: [phone]            Follow-ups after your visit        Additional Services     OB/GYN REFERRAL       Your provider has referred you to:  [facility]: [facility] [place] [phone]   [email or web address]    Please be aware that coverage of these services is subject to the terms and limitations of your health insurance plan.  Call member services at your health plan with any benefit or coverage questions.      Please bring the following with you to your appointment:    (1) Any X-Rays, CTs or MRIs which have been performed.  Contact the facility where they were done to arrange for  prior to your scheduled appointment.   (2) List of current medications   (3) This referral request   (4) Any documents/labs given to you for this referral                  Who to contact     If you have questions or need follow up information about today's clinic visit or your schedule please contact [facility] directly at [phone].  Normal or non-critical lab and imaging results will be communicated to you by MyChart, letter or phone within 4 business days after the clinic has received the results. If you do not hear from us within 7 days, please contact the clinic through MyChart or phone. If you have a critical or abnormal lab result, we will notify you by phone as soon as possible.  Submit refill requests through [employer] or call your pharmacy and they will forward the refill request to us. Please allow 3 business days for your refill to be completed.          Additional Information About Your Visit        MyChart Information     [employer] gives you secure access to your electronic health record. If you see a primary care provider, you can also send messages to your care team and make appointments. If you have questions, please call your primary care clinic.  If  "you do not have a primary care provider, please call [phone] and they will assist you.        Care EveryWhere ID     This is your Care EveryWhere ID. This could be used by other organizations to access your [place] medical records  [id number]        Your Vitals Were     Pulse Temperature Height Last Period Pulse Oximetry Breastfeeding?    79 98  F (36.7  C) (Oral) 5' 3.9\" (1.623 m) 11/11/2017 99% No    BMI (Body Mass Index)                   28.79 kg/m2            Blood Pressure from Last 3 Encounters:   12/11/17 119/72   09/27/17 99/63   04/21/17 97/66    Weight from Last 3 Encounters:   12/11/17 167 lb 3.2 oz (75.8 kg)   09/27/17 167 lb 1.6 oz (75.8 kg)   04/21/17 167 lb (75.8 kg)              We Performed the Following     Beta HCG qual IFA urine - [facility] and [place]     OB/GYN REFERRAL        Primary Care Provider Office Phone # Fax #    Desire Elis Waldron [phone]       [address]  [facility] [address]        Equal Access to Services     JULIOCESAR BARTLETT AH: Hadii lulu burch hadasho Soomaali, waaxda luqadaha, qaybta kaalmada adeegyada, agapito joshi. So [facility] [phone].    ATENCIÓN: Si habla español, tiene a french disposición servicios gratuitos de asistencia lingüística. Carminaame al [phone].    We comply with applicable federal civil rights laws and [place] laws. We do not discriminate on the basis of race, color, national origin, age, disability, sex, sexual orientation, or gender identity.            Thank you!     Thank you for choosing [facility]  for your care. Our goal is always to provide you with excellent care. Hearing back from our patients is one way we can continue to improve our services. Please take a few minutes to complete the written survey that you may receive in the mail after your visit with us. Thank you!             Your Updated Medication List - Protect others around you: Learn how to safely " use, store and throw away your medicines at www.disposemymeds.org.          This list is accurate as of: 12/11/17  1:28 PM.  Always use your most recent med list.                   Brand Name Dispense Instructions for use Diagnosis    ADVIL PO      Reported on 3/6/2017        prenatal multivitamin plus iron 27-0.8 MG Tabs per tablet      Take 1 tablet by mouth daily

## 2017-12-12 ENCOUNTER — HOSPITAL ENCOUNTER (EMERGENCY)
Facility: CLINIC | Age: 32
Discharge: HOME | End: 2017-12-12
Attending: EMERGENCY MEDICINE | Admitting: EMERGENCY MEDICINE
Payer: COMMERCIAL

## 2017-12-12 ENCOUNTER — APPOINTMENT (OUTPATIENT)
Dept: ULTRASOUND IMAGING | Facility: CLINIC | Age: 32
End: 2017-12-12
Attending: EMERGENCY MEDICINE
Payer: COMMERCIAL

## 2017-12-12 ENCOUNTER — TELEPHONE (OUTPATIENT)
Dept: OBGYN | Facility: CLINIC | Age: 32
End: 2017-12-12

## 2017-12-12 VITALS
DIASTOLIC BLOOD PRESSURE: 76 MMHG | HEART RATE: 79 BPM | TEMPERATURE: 98.9 F | HEIGHT: 64 IN | OXYGEN SATURATION: 99 % | RESPIRATION RATE: 16 BRPM | WEIGHT: 167 LBS | SYSTOLIC BLOOD PRESSURE: 114 MMHG | BODY MASS INDEX: 28.51 KG/M2

## 2017-12-12 DIAGNOSIS — O20.9 VAGINAL BLEEDING IN PREGNANCY, FIRST TRIMESTER: ICD-10-CM

## 2017-12-12 LAB
ALBUMIN UR-MCNC: NEGATIVE MG/DL
APPEARANCE UR: CLEAR
B-HCG SERPL-ACNC: 1277 IU/L (ref 0–5)
BACTERIA #/AREA URNS HPF: ABNORMAL /HPF
BILIRUB UR QL STRIP: NEGATIVE
COLOR UR AUTO: YELLOW
GLUCOSE UR STRIP-MCNC: NEGATIVE MG/DL
HGB UR QL STRIP: ABNORMAL
KETONES UR STRIP-MCNC: NEGATIVE MG/DL
LEUKOCYTE ESTERASE UR QL STRIP: NEGATIVE
MUCOUS THREADS #/AREA URNS LPF: PRESENT /LPF
NITRATE UR QL: NEGATIVE
NON-SQ EPI CELLS #/AREA URNS LPF: ABNORMAL /LPF
PH UR STRIP: 7 PH (ref 5–7)
RBC #/AREA URNS AUTO: ABNORMAL /HPF
SOURCE: ABNORMAL
SP GR UR STRIP: 1.02 (ref 1–1.03)
UROBILINOGEN UR STRIP-ACNC: 0.2 EU/DL (ref 0.2–1)
WBC #/AREA URNS AUTO: ABNORMAL /HPF

## 2017-12-12 PROCEDURE — 84702 CHORIONIC GONADOTROPIN TEST: CPT | Performed by: EMERGENCY MEDICINE

## 2017-12-12 PROCEDURE — 76801 OB US < 14 WKS SINGLE FETUS: CPT

## 2017-12-12 PROCEDURE — 81001 URINALYSIS AUTO W/SCOPE: CPT | Performed by: EMERGENCY MEDICINE

## 2017-12-12 PROCEDURE — 99285 EMERGENCY DEPT VISIT HI MDM: CPT | Mod: 25

## 2017-12-12 ASSESSMENT — ENCOUNTER SYMPTOMS
ABDOMINAL PAIN: 1
SORE THROAT: 1
RHINORRHEA: 1

## 2017-12-12 NOTE — ED AVS SNAPSHOT
Emergency Department    [address] [place] [address]    Phone:  [phone]    Fax:  [phone]                                       Ebony Jo   MRN: [id number]    Department:   Emergency Department   Date of Visit:  12/12/2017           After Visit Summary Signature Page     I have received my discharge instructions, and my questions have been answered. I have discussed any challenges I see with this plan with the nurse or doctor.    ..........................................................................................................................................  Patient/Patient Representative Signature      ..........................................................................................................................................  Patient Representative Print Name and Relationship to Patient    ..................................................               ................................................  Date                                            Time    ..........................................................................................................................................  Reviewed by Signature/Title    ...................................................              ..............................................  Date                                                            Time

## 2017-12-12 NOTE — ED AVS SNAPSHOT
Emergency Department    [address] [facility] [address]    Phone:  [phone]    Fax:  [phone]                                       Ebony Jo   MRN: [id number]    Department:   Emergency Department   Date of Visit:  12/12/2017           Patient Information     Date Of Birth          1985        Your diagnoses for this visit were:     Vaginal bleeding in pregnancy, first trimester        You were seen by Hialrio Vázquez MD.      Follow-up Information     Follow up with Floyd Corcoran MD. Schedule an appointment as soon as possible for a visit in 2 days.    Specialty:  OB/Gyn    Contact information:    [address]  [place] [address]  [phone]          Discharge Instructions         Bleeding During Early Pregnancy     Ultrasound can help check the health of your fetus.     If you ve had bleeding early in your pregnancy, you re not alone. Many other pregnant women have had early bleeding, too. And in most cases, nothing is wrong. But your healthcare provider still needs to know about it. He or she may want to do tests to find out why you re bleeding. Call your healthcare provider if you notice bleeding during pregnancy.  What causes early bleeding?  The cause of bleeding early in pregnancy is often unknown. But many factors early on in pregnancy may lead to bleeding or spotting. These include sexual intercourse, which may cause bleeding in any trimester. Here are some other causes:    Implantation of the embryo on the uterine wall    Subchorionic hemorrhage (bleeding between the sac membrane and the uterus)    Miscarriage    Ectopic (tubal) pregnancy  If you notice spotting  Spotting (very light bleeding) is the most common type of bleeding in early pregnancy. If you notice it, call your healthcare provider. Chances are, he or she will tell you that you can care for yourself at home.  If tests are needed  Depending on how much you bleed, your healthcare provider  may ask you to come in for some tests. A pelvic exam, for instance, can help see how far along your pregnancy is. You also may have an ultrasound or a Doppler test. These imaging tests use sound waves to check the health of your fetus. The ultrasound may be done on your belly or inside your vagina. Your healthcare provider also may order a special blood test. This test compares your hormone levels in blood samples taken 2 days apart. The results can help your healthcare provider learn more about the implantation of the embryo. Your blood type will also need to be checked to evaluate whether you will need to be treated for Rh sensitization.   Warning signs  If your bleeding doesn t stop or if you notice any of the following, seek medical help right away:    Soaking a sanitary pad each hour    Bleeding like you re having a period    Cramping or severe belly pain    Feeling dizzy or faint    Tissue passing through your vagina    Bleeding at any time after the first trimester  Questions you may be asked  Though not normal, bleeding early in pregnancy is common. If you ve noticed any bleeding, you may be concerned. But keep in mind that bleeding alone doesn t mean something is wrong. Call your healthcare provider right away, though. He or she may ask you questions like these to help find the cause of your bleeding:    When did your bleeding start?    Is your bleeding very light (spotting) or is it like a period?    Is the blood bright red or brownish?    Have you had sexual intercourse recently?    Have you had pain or cramping?    Have you felt dizzy or faint?  Monitoring your pregnancy  Bleeding will often stop as quickly as it began. Your pregnancy may go on a normal path again. You may need to make a few extra prenatal visits. But you and your baby will most likely be fine.  Date Last Reviewed: 6/1/2016 2000-2017 [employer]. [address] [place] [address], [place] [address]. All rights reserved. This  information is not intended as a substitute for professional medical care. Always follow your healthcare professional's instructions.          Future Appointments        Provider Department Dept Phone Center    1/16/2018 10:00 AM Fredy Leal MD [facility] [phone] [facility]      24 Hour Appointment Hotline       To make an appointment at any [facility], call [phone] ([phone]). If you don't have a family doctor or clinic, we will help you find one. [facility] are conveniently located to serve the needs of you and your family.             Review of your medicines      Our records show that you are taking the medicines listed below. If these are incorrect, please call your family doctor or clinic.        Dose / Directions Last dose taken    ADVIL PO        Reported on 3/6/2017   Refills:  0        prenatal multivitamin plus iron 27-0.8 MG Tabs per tablet   Dose:  1 tablet        Take 1 tablet by mouth daily   Refills:  0                Procedures and tests performed during your visit     *UA reflex to Microscopic (ED Lab POCT Only 3-11)    HCG quantitative pregnancy (blood)    OB  US 1st trimester w transvag    Urine Microscopic      Orders Needing Specimen Collection     None      Pending Results     Date and Time Order Name Status Description    12/12/2017 1844 OB  US 1st trimester w transvag Preliminary             Pending Culture Results     No orders found from 12/10/2017 to 12/13/2017.            Pending Results Instructions     If you had any lab results that were not finalized at the time of your Discharge, you can call the ED Lab Result RN at [phone]. You will be contacted by this team for any positive Lab results or changes in treatment. The nurses are available 7 days a week from 10A to 6:30P.  You can leave a message 24 hours per day and they will return your call.        Test Results From Your Hospital Stay        12/12/2017  7:44 PM       Narrative     OBSTETRIC ULTRASOUND LESS THAN 14 WEEKS WITH TRANSVAGINAL SINGLE  12/12/2017 7:32 PM     HISTORY: Vaginal bleed, 4 weeks pregnant.    TECHNIQUE: Transabdominal and transvaginal imaging were performed.  Transvaginal imaging was performed to better evaluate the uterus and  gestational sac.    COMPARISON:  None.    FINDINGS: 0.3 cm sonolucency within the endometrium could represent an  early intrauterine gestational sac. No yolk sac or fetal pole is  identified to exclude pseudosac. Trace clear free pelvic fluid is  present within the cul-de-sac. Right ovarian 1.8 cm corpus luteal cyst  is noted. The left ovary appeared within normal limits. No adnexal  mass was identified.        Impression     IMPRESSION: Possible early intrauterine gestational sac measuring 0.3  cm. Given lack of visible yolk sac or fetal pole, viability cannot be  established. Pseudosac of ectopic gestation also cannot be excluded.  No adnexal mass is identified. Follow-up serial quantitative beta-hCG  is recommended with follow-up ultrasound as clinically indicated.         12/12/2017  7:50 PM      Component Results     Component Value Ref Range & Units Status    HCG Quantitative Serum 1277 (H) 0 - 5 IU/L Final         12/12/2017  7:24 PM      Component Results     Component Value Ref Range & Units Status    Color Urine Yellow  Final    Appearance Urine Clear  Final    Glucose Urine Negative NEG^Negative mg/dL Final    Bilirubin Urine Negative NEG^Negative Final    Ketones Urine Negative NEG^Negative mg/dL Final    Specific Gravity Urine 1.025 1.003 - 1.035 Final    Blood Urine Moderate (A) NEG^Negative Final    pH Urine 7.0 5.0 - 7.0 pH Final    Protein Albumin Urine Negative NEG^Negative mg/dL Final    Urobilinogen Urine 0.2 0.2 - 1.0 EU/dL Final    Nitrite Urine Negative NEG^Negative Final    Leukocyte Esterase Urine Negative NEG^Negative Final    Source Midstream Urine  Final         12/12/2017  7:24 PM      Component Results      Component Value Ref Range & Units Status    WBC Urine O - 2 OTO2^O - 2 /HPF Final    RBC Urine 2-5 (A) OTO2^O - 2 /HPF Final    Squamous Epithelial /LPF Urine Many (A) FEW^Few /LPF Final    Bacteria Urine Few (A) NEG^Negative /HPF Final    Mucous Urine Present (A) NEG^Negative /LPF Final                Clinical Quality Measure: Blood Pressure Screening     Your blood pressure was checked while you were in the emergency department today. The last reading we obtained was  BP: 114/76 . Please read the guidelines below about what these numbers mean and what you should do about them.  If your systolic blood pressure (the top number) is less than 120 and your diastolic blood pressure (the bottom number) is less than 80, then your blood pressure is normal. There is nothing more that you need to do about it.  If your systolic blood pressure (the top number) is 120-139 or your diastolic blood pressure (the bottom number) is 80-89, your blood pressure may be higher than it should be. You should have your blood pressure rechecked within a year by a primary care provider.  If your systolic blood pressure (the top number) is 140 or greater or your diastolic blood pressure (the bottom number) is 90 or greater, you may have high blood pressure. High blood pressure is treatable, but if left untreated over time it can put you at risk for heart attack, stroke, or kidney failure. You should have your blood pressure rechecked by a primary care provider within the next 4 weeks.  If your provider in the emergency department today gave you specific instructions to follow-up with your doctor or provider even sooner than that, you should follow that instruction and not wait for up to 4 weeks for your follow-up visit.        Thank you for choosing [facility]       Thank you for choosing [facility] for your care. Our goal is always to provide you with excellent care. Hearing back from our patients is one way we can continue to improve our  services. Please take a few minutes to complete the written survey that you may receive in the mail after you visit with us. Thank you!        [employer] Information     [email or web address] gives you secure access to your electronic health record. If you see a primary care provider, you can also send messages to your care team and make appointments. If you have questions, please call your primary care clinic.  If you do not have a primary care provider, please call [phone] and they will assist you.        Care EveryWhere ID     This is your Care EveryWhere ID. This could be used by other organizations to access your [facility] medical records  [id number]        Equal Access to Services     BRIDGET BARTLETT : Inocencia Vasquez, jeffry castillo, agapito damon. So [facility] [phone].    ATENCIÓN: Si habla español, tiene a french disposición servicios gratuitos de asistencia lingüística. Llame al [phone].    We comply with applicable federal civil rights laws and [place] laws. We do not discriminate on the basis of race, color, national origin, age, disability, sex, sexual orientation, or gender identity.            After Visit Summary       This is your record. Keep this with you and show to your community pharmacist(s) and doctor(s) at your next visit.

## 2017-12-13 NOTE — DISCHARGE INSTRUCTIONS
Bleeding During Early Pregnancy     Ultrasound can help check the health of your fetus.     If you ve had bleeding early in your pregnancy, you re not alone. Many other pregnant women have had early bleeding, too. And in most cases, nothing is wrong. But your healthcare provider still needs to know about it. He or she may want to do tests to find out why you re bleeding. Call your healthcare provider if you notice bleeding during pregnancy.  What causes early bleeding?  The cause of bleeding early in pregnancy is often unknown. But many factors early on in pregnancy may lead to bleeding or spotting. These include sexual intercourse, which may cause bleeding in any trimester. Here are some other causes:    Implantation of the embryo on the uterine wall    Subchorionic hemorrhage (bleeding between the sac membrane and the uterus)    Miscarriage    Ectopic (tubal) pregnancy  If you notice spotting  Spotting (very light bleeding) is the most common type of bleeding in early pregnancy. If you notice it, call your healthcare provider. Chances are, he or she will tell you that you can care for yourself at home.  If tests are needed  Depending on how much you bleed, your healthcare provider may ask you to come in for some tests. A pelvic exam, for instance, can help see how far along your pregnancy is. You also may have an ultrasound or a Doppler test. These imaging tests use sound waves to check the health of your fetus. The ultrasound may be done on your belly or inside your vagina. Your healthcare provider also may order a special blood test. This test compares your hormone levels in blood samples taken 2 days apart. The results can help your healthcare provider learn more about the implantation of the embryo. Your blood type will also need to be checked to evaluate whether you will need to be treated for Rh sensitization.   Warning signs  If your bleeding doesn t stop or if you notice any of the following, seek  medical help right away:    Soaking a sanitary pad each hour    Bleeding like you re having a period    Cramping or severe belly pain    Feeling dizzy or faint    Tissue passing through your vagina    Bleeding at any time after the first trimester  Questions you may be asked  Though not normal, bleeding early in pregnancy is common. If you ve noticed any bleeding, you may be concerned. But keep in mind that bleeding alone doesn t mean something is wrong. Call your healthcare provider right away, though. He or she may ask you questions like these to help find the cause of your bleeding:    When did your bleeding start?    Is your bleeding very light (spotting) or is it like a period?    Is the blood bright red or brownish?    Have you had sexual intercourse recently?    Have you had pain or cramping?    Have you felt dizzy or faint?  Monitoring your pregnancy  Bleeding will often stop as quickly as it began. Your pregnancy may go on a normal path again. You may need to make a few extra prenatal visits. But you and your baby will most likely be fine.  Date Last Reviewed: 6/1/2016 2000-2017 [employer]. [address] [place] [address], [place] [address]. All rights reserved. This information is not intended as a substitute for professional medical care. Always follow your healthcare professional's instructions.

## 2017-12-13 NOTE — TELEPHONE ENCOUNTER
"Noted patient had pregnancy confirmation appt with Dr. Huston on 12-11-17. LMP 11-11-17. 4w 4d.  Phone call to patient. She stated she had only one episode of vaginal bleeding. Noted it in her underwear and on the toilet tissue after voiding. She has had not other bleeding since. Patient stated she had \"very mild\" cramping but none right now. Explained no appts on Thursday with Dr. Leal but offered appt on 12-15-17. Patient agreed to schedule. Gave patient home care advise of increasing water intake, pelvic rest and to call back to clinic if sxs change. Patient agreed and appreciative of assistance. Toyin Baez RN, NOE      "

## 2017-12-13 NOTE — TELEPHONE ENCOUNTER
Patient is pregnant. Has not been seen for prenatal visit yet. She was seen in the hospital [facility] for bleeding and advised to follow up with OB in 2 days. Can patient be worked in with Dr. Leal on Thursday? Please call to discuss.    Evelin CRAWFORD  Central Scheduler

## 2017-12-13 NOTE — ED PROVIDER NOTES
"  History     Chief Complaint:  Vaginal Bleeding     HPI   Ebony Jo is a 32 year old 4 weeks pregnant female who presents to the emergency department today for evaluation of vaginal bleeding. The patient has had 2 live births previously and this is her third pregnancy. Approximately 1 hour ago she felt sudden pain in the middle of her abdomen. When she used the restroom, she noticed blood spotting on her underwear, but did not see any blood in her urine. She did see some blood on the toilet paper. The states that she does have some cold symptoms.    Allergies:  Drug allergies reviewed. No pertinent drug allergies.     Medications:    Prenatal Vit-Fe Fumarate-FA (PRENATAL MULTIVITAMIN PLUS IRON) 27-0.8 MG TABS per tablet  Ibuprofen (ADVIL PO)    Past Medical History:    Hemifacial spasm, right   Malaria   Migraine without aura    Past Surgical History:    Appendectomy     Family History:    Alzheimer Disease  Father     Social History:  Smoking Status: Never Smoker  Smokeless Tobacco: Never Used  Alcohol Use: Negative   Marital Status:  Single     Review of Systems   HENT: Positive for congestion, rhinorrhea, sneezing and sore throat.    Gastrointestinal: Positive for abdominal pain.   Genitourinary: Positive for vaginal bleeding.   All other systems reviewed and are negative.    Physical Exam     Patient Vitals for the past 24 hrs:   BP Temp Temp src Pulse Resp SpO2 Height Weight   12/12/17 1833 114/76 98.9  F (37.2  C) Oral 79 16 99 % 1.626 m (5' 4\") 75.8 kg (167 lb)     Physical Exam  Nursing note and vitals reviewed.  Constitutional:   Awake alert  HENT:   Pharynx normal, tms normal, atraumatic  Mouth/Throat:  Oropharynx is clear and moist.   Eyes:    Conjunctivae normal and EOM are normal. Pupils are equal, round, and reactive to light.   Neck:    Normal range of motion. Neck supple. No tracheal deviation present.   Cardiovascular: Normal rate, regular rhythm, normal heart sounds and intact distal pulses.  "   Pulmonary/Chest:  Effort normal and breath sounds normal. No respiratory distress. No wheezes. No rales. No tenderness.   Abdominal:   Soft. The patient exhibits no mass. There is no tenderness. There is no rebound and no guarding.   Musculoskeletal:  Normal range of motion. No edema and no tenderness.   Lymphadenopathy:  No cervical adenopathy.   Neurological:  Alert and oriented to person, place, and time. No cranial nerve deficit. Normal muscle tone.   Skin:    Skin is warm and dry.   Psychiatric:   Normal mood and affect.     Emergency Department Course     Imaging:  Radiology findings were communicated with the patient who voiced understanding of the findings.    OB  US 1st trimester w transvag  IMPRESSION: Possible early intrauterine gestational sac measuring 0.3  cm. Given lack of visible yolk sac or fetal pole, viability cannot be  established. Pseudosac of ectopic gestation also cannot be excluded.  No adnexal mass is identified. Follow-up serial quantitative beta-hCG  is recommended with follow-up ultrasound as clinically indicated.    Laboratory:  Laboratory findings were communicated with the patient who voiced understanding of the findings.    UA reflex to with Microscopic: Blood Moderate (A) o/w WNL  Urine microscopic: WBC/HPF 0-2, RBC/HPF 2-5 (A), Squamous Epithelial/HPF Many (A), Bacteria Few (A), Mucous Present (A)    HCG quantitative pregnancy: 1277 (H)    Emergency Department Course:    Nursing notes and vitals reviewed.    1845 I performed an exam of the patient as documented above.     IV was inserted and blood was drawn for laboratory testing, results above.     The patient provided a urine sample here in the emergency department. This was sent for laboratory testing, findings above.     The patient was sent for a OB US 1st trimester w transvag while in the emergency department, results above.      I personally reviewed the lab and ultrasound results with the patient and answered all related  questions prior to discharge.    I discussed the treatment plan with the patient. They expressed understanding of this plan and consented to discharge. They will be discharged home with instructions for care and follow up. In addition, the patient will return to the emergency department if their symptoms persist, worsen, if new symptoms arise or if there is any concern.  All questions were answered.     Impression & Plan      Medical Decision Making:  Ebony Jo is a 32 year old female who presents to the emergency department today for evaluation of Vaginal bleeding.  She is four weeks three days pregnant.  G3 para two.  She was at her clinic yesterday and confirmed positivehome pregnancy test and positive pregnancy test in the clinic.  Tonight she had an episode of vaginal spotting.  She had a episode of crampy midline lower abdominal pain that only lasted a few minutes.  Benign abdominal exam now.  Urinalysis is clear.  Quantitative hCG noted as above.  No current vaginal bleeding.  Ultrasound results as above.  Patient is stable for discharge to home.  Probable early pregnancy with ultrasound results.  Will have the patient follow-up in two days with her OB clinic for repeat quantitative hCG and reevaluation.  Call her OB clinic if she has any further cramping or bleeding.  Differential includes early miscarriage less likely as ectopic pregnancy.  More likely is first trimester vaginal bleeding.    Diagnosis:    ICD-10-CM    1. Vaginal bleeding in pregnancy, first trimester O20.9      Disposition:   The patient is discharged to home.     Scribe Disclosure:  Marie PETERSON, am serving as a scribe at 6:38 PM on 12/12/2017 to document services personally performed by Hilario Vázquez MD, based on my observations and the provider's statements to me.       EMERGENCY DEPARTMENT       Hilario Vázquez MD  12/12/17 3511

## 2017-12-15 ENCOUNTER — OFFICE VISIT (OUTPATIENT)
Dept: OBGYN | Facility: CLINIC | Age: 32
End: 2017-12-15
Payer: COMMERCIAL

## 2017-12-15 VITALS
SYSTOLIC BLOOD PRESSURE: 109 MMHG | WEIGHT: 166 LBS | DIASTOLIC BLOOD PRESSURE: 68 MMHG | TEMPERATURE: 98.1 F | HEART RATE: 75 BPM | BODY MASS INDEX: 28.49 KG/M2

## 2017-12-15 DIAGNOSIS — O20.0 THREATENED SPONTANEOUS ABORTION: ICD-10-CM

## 2017-12-15 DIAGNOSIS — Z32.01 PREGNANCY TEST POSITIVE: Primary | ICD-10-CM

## 2017-12-15 LAB — B-HCG SERPL-ACNC: 3215 IU/L (ref 0–5)

## 2017-12-15 PROCEDURE — 36415 COLL VENOUS BLD VENIPUNCTURE: CPT | Performed by: OBSTETRICS & GYNECOLOGY

## 2017-12-15 PROCEDURE — 84702 CHORIONIC GONADOTROPIN TEST: CPT | Performed by: OBSTETRICS & GYNECOLOGY

## 2017-12-15 PROCEDURE — 99213 OFFICE O/P EST LOW 20 MIN: CPT | Performed by: OBSTETRICS & GYNECOLOGY

## 2017-12-15 NOTE — PATIENT INSTRUCTIONS
If you have any questions regarding your visit, Please contact your care team.     [employer] [facility]: [phone]    [facility] CLINIC HOURS TELEPHONE NUMBER   CHAY Costa-    Vee Deal-MATTHEW Campbell-Medical Assistant   Monday-[place]  8:00a.m-4:45 p.m  Wednesday-[place] 8:00a.m-4:45 p.m.  Thursday-[place]  8:00a.m-4:45 p.m.  Friday-[place]  8:00a.m-4:45 p.m. [facility]  [address] [place] [address]  [phone] ask [facility]  [phone] Fax  Imaging [phone]    [facility]  [address] [place] [address] [place] [address]  [phone]    [facility]  [address] [place] [address]  [phone] [facility]  [phone] Fax  Imaging [phone]     Urgent Care locations:    [place]        [place] Monday-Friday  5 pm - 9 pm  Saturday and Sunday   9 am - 5 pm    Monday-Friday   11 am - 9 pm  Saturday and Sunday   9 am - 5 pm   [phone]       If you need a medication refill, please contact your pharmacy. Please allow 3 business days for your refill to be completed.  As always, Thank you for trusting us with your healthcare needs!

## 2017-12-15 NOTE — MR AVS SNAPSHOT
After Visit Summary   12/15/2017    Ebony Jo    MRN: [id number]           Patient Information     Date Of Birth          1985        Visit Information        Provider Department      12/15/2017 10:30 AM Fredy Leal MD [facility]        Care Instructions                                                        If you have any questions regarding your visit, Please contact your care team.     [facility]: [phone]    [facility] CLINIC HOURS TELEPHONE NUMBER   Fredy Leal M.D.      Ariana-    Vee Deal-MATTHEW Campbell-Medical Assistant   Monday-[place]  8:00a.m-4:45 p.m  [facility]45 [facility].  [facility]  8:00[facility].m-4:45 p.m.  [facility]  8:00a.m-4:45 p.m. [facility]  [address] [address] [place], [place] [address]Roberto Carlos  [place] [address] [phone] ask [facility]  [phone] Fax  Imaging [phone]    [facility]  [address] [place] [address] Dr.  [place], [place] [address] [phone]    [facility]  [address] [place], [place] [address] [phone] [facility]  [phone] Fax  Imaging [phone]     Urgent Care locations:    [facility] Monday-Friday  5 pm - 9 pm  Saturday and Sunday   9 am - 5 pm    Monday-Friday   11 am - 9 pm  Saturday and Sunday   9 am - 5 pm   [phone]       If you need a medication refill, please contact your pharmacy. Please allow 3 business days for your refill to be completed.  As always, Thank you for trusting us with your healthcare needs!            Follow-ups after your visit        Your next 10 appointments already scheduled     Jan 16, 2018 10:00 AM CST   New Prenatal with Fredy Leal MD   [facility] ([facility]    [address] [facility] [address]   [phone]              Who to  contact     If you have questions or need follow up information about today's clinic visit or your schedule please contact [facility] directly at [phone].  Normal or non-critical lab and imaging results will be communicated to you by MyChart, letter or phone within 4 business days after the clinic has received the results. If you do not hear from us within 7 days, please contact the clinic through [employer] or phone. If you have a critical or abnormal lab result, we will notify you by phone as soon as possible.  Submit refill requests through [employer] or call your pharmacy and they will forward the refill request to us. Please allow 3 business days for your refill to be completed.          Additional Information About Your Visit        [employer] Information     [employer] gives you secure access to your electronic health record. If you see a primary care provider, you can also send messages to your care team and make appointments. If you have questions, please call your primary care clinic.  If you do not have a primary care provider, please call [phone] and they will assist you.        Care EveryWhere ID     This is your Care EveryWhere ID. This could be used by other organizations to access your [facility] medical records  [id number]        Your Vitals Were     Pulse Temperature Last Period Breastfeeding? BMI (Body Mass Index)       75 98.1  F (36.7  C) (Oral) 11/11/2017 (Exact Date) No 28.49 kg/m2        Blood Pressure from Last 3 Encounters:   12/15/17 109/68   12/12/17 114/76   12/11/17 119/72    Weight from Last 3 Encounters:   12/15/17 166 lb (75.3 kg)   12/12/17 167 lb (75.8 kg)   12/11/17 167 lb 3.2 oz (75.8 kg)              Today, you had the following     No orders found for display       Primary Care Provider Office Phone # Fax #    Desire Waldron [phone]       [address]  [facility] [address]        Equal Access to Services      BRIDGET BARTLETT : Hadii aad marcin bree Vasquez, waaxda luqadaha, qaybta kaalmada nilsonreganhenrry, agapito cabavilmaservando razo . So [facility] [phone].    ATENCIÓN: Si habla español, tiene a french disposición servicios gratuitos de asistencia lingüística. Llame al [phone].    We comply with applicable federal civil rights laws and [place] laws. We do not discriminate on the basis of race, color, national origin, age, disability, sex, sexual orientation, or gender identity.            Thank you!     Thank you for choosing [facility]  for your care. Our goal is always to provide you with excellent care. Hearing back from our patients is one way we can continue to improve our services. Please take a few minutes to complete the written survey that you may receive in the mail after your visit with us. Thank you!             Your Updated Medication List - Protect others around you: Learn how to safely use, store and throw away your medicines at www.disposemymeds.org.          This list is accurate as of: 12/15/17 10:30 AM.  Always use your most recent med list.                   Brand Name Dispense Instructions for use Diagnosis    ADVIL PO      Reported on 3/6/2017        prenatal multivitamin plus iron 27-0.8 MG Tabs per tablet      Take 1 tablet by mouth daily

## 2017-12-15 NOTE — PROGRESS NOTES
OB-GYN Problem-Oriented Visit or Consultation      Ebony Jo is a 32 year old year old P 0 who presents with a chief complaint of threatened SAB.  Referred by sisters/pts.  Patient's last menstrual period was 2017 (exact date).    HPI:     Planned pregnancy. Possible arcuate uterine contour on prior u/s. Had one episode of pelvic pain and limited vaginal spotting with  ED evaluation neg and HCG 1277 and probable small IUP on u/s without embryo seen yet. Menses q 28-30 days x 4-5 days. Mild URI symptoms now- instructions given.       Past medical, obstetrical, surgical, family and social history reviewed and as noted or updated in chart.     Allergies, meds and supplements are as noted or updated in chart.      ROS:   Systems reviewed include:  constitutional, breast, cardiac, respiratory, gastrointestinal, genitourinary, musculoskeletal, integumentary, psychological, hematologic/lymphatic and endocrine.    These systems were negative for significant symptoms except for the following additional: none; see HPI.    EXAM:  VS as noted. /68 (BP Location: Left arm, Patient Position: Chair, Cuff Size: Adult Regular)  Pulse 75  Temp 98.1  F (36.7  C) (Oral)  Wt 166 lb (75.3 kg)  LMP 2017 (Exact Date)  Breastfeeding? No  BMI 28.49 kg/m2  Constitutionally normal.     Exam not repeated at this time.    Assessment:   Encounter Diagnoses   Name Primary?     Pregnancy test positive Yes     Threatened spontaneous       Plan and Recommendations: I reviewed the condition, causes, differential diagnosis, prognosis, evaluation and management considerations and options.  Questions answered and information given.  See orders.  Continue pelvic rest. Serial HCG. U/S recheck at 2 weeks. Has 1st OB visit in . Pap due soon.   Total encounter time (physician together with patient) = 15min. Direct counseling, education and care coordination time (physician together with patient) = 15min.      A/P:  Ebony was seen today for prenatal care.    Diagnoses and all orders for this visit:    Pregnancy test positive  -     HCG quantitative pregnancy; Standing  -     HCG quantitative pregnancy  -     US OB < 14 Weeks Single; Future    Threatened spontaneous   -     HCG quantitative pregnancy; Standing  -     HCG quantitative pregnancy  -     US OB < 14 Weeks Single; Future        Fredy Leal MD

## 2017-12-15 NOTE — NURSING NOTE
"Chief Complaint   Patient presents with     Prenatal Care     ER follow-up for vaginal bleeding       Initial /68 (BP Location: Left arm, Patient Position: Chair, Cuff Size: Adult Regular)  Pulse 75  Temp 98.1  F (36.7  C) (Oral)  Wt 166 lb (75.3 kg)  LMP 11/11/2017 (Exact Date)  Breastfeeding? No  BMI 28.49 kg/m2 Estimated body mass index is 28.49 kg/(m^2) as calculated from the following:    Height as of 12/12/17: 5' 4\" (1.626 m).    Weight as of this encounter: 166 lb (75.3 kg).  Medication Reconciliation: complete   Shannan Pagan CMA      "

## 2017-12-21 DIAGNOSIS — O20.0 THREATENED SPONTANEOUS ABORTION: ICD-10-CM

## 2017-12-21 DIAGNOSIS — Z32.01 PREGNANCY TEST POSITIVE: ICD-10-CM

## 2017-12-21 LAB — B-HCG SERPL-ACNC: 7863 IU/L (ref 0–5)

## 2017-12-21 PROCEDURE — 84702 CHORIONIC GONADOTROPIN TEST: CPT | Performed by: OBSTETRICS & GYNECOLOGY

## 2017-12-21 PROCEDURE — 36415 COLL VENOUS BLD VENIPUNCTURE: CPT | Performed by: OBSTETRICS & GYNECOLOGY

## 2017-12-25 ENCOUNTER — NURSE TRIAGE (OUTPATIENT)
Dept: NURSING | Facility: CLINIC | Age: 32
End: 2017-12-25

## 2017-12-25 NOTE — TELEPHONE ENCOUNTER
Reason for Disposition    [1] Intermittent lower abdominal pain (e.g., cramping) AND [2] present > 24 hours    Additional Information    Negative: Shock suspected (e.g., cold/pale/clammy skin, too weak to stand, low BP, rapid pulse)    Negative: Difficult to awaken or acting confused  (e.g., disoriented, slurred speech)    Negative: Passed out (i.e., lost consciousness, collapsed and was not responding)    Negative: Sounds like a life-threatening emergency to the triager    Negative: SEVERE abdominal pain    Negative: [1] SEVERE vaginal bleeding (i.e., soaking 2 pads / hour, large blood clots) AND [2] present 2 or more hours    Negative: [1] MODERATE vaginal bleeding (i.e., soaking 1 pad / hour; clots) AND [2] present > 6 hours    Negative: [1] MODERATE vaginal bleeding (i.e., soaking 1 pad / hour; clots) AND [2] pregnant > 12 weeks    Negative: Passed tissue (e.g., gray-white)    Negative: Shoulder pain    Negative: Pale skin (pallor) of new onset or worsening    Negative: Patient sounds very sick or weak to the triager    Negative: [1] Constant abdominal pain AND [2] present > 2 hours    Negative: Fever > 100.4 F (38.0 C)    Protocols used: PREGNANCY - VAGINAL BLEEDING LESS THAN 20 WEEKS EGA-ADULT-

## 2017-12-27 ENCOUNTER — TELEPHONE (OUTPATIENT)
Dept: URGENT CARE | Facility: URGENT CARE | Age: 32
End: 2017-12-27

## 2017-12-27 ENCOUNTER — RADIANT APPOINTMENT (OUTPATIENT)
Dept: ULTRASOUND IMAGING | Facility: CLINIC | Age: 32
End: 2017-12-27
Attending: OBSTETRICS & GYNECOLOGY
Payer: COMMERCIAL

## 2017-12-27 DIAGNOSIS — O20.0 THREATENED SPONTANEOUS ABORTION: ICD-10-CM

## 2017-12-27 DIAGNOSIS — Z32.01 PREGNANCY TEST POSITIVE: ICD-10-CM

## 2017-12-27 LAB — B-HCG SERPL-ACNC: ABNORMAL IU/L (ref 0–5)

## 2017-12-27 PROCEDURE — 84702 CHORIONIC GONADOTROPIN TEST: CPT | Performed by: OBSTETRICS & GYNECOLOGY

## 2017-12-27 PROCEDURE — 36415 COLL VENOUS BLD VENIPUNCTURE: CPT | Performed by: OBSTETRICS & GYNECOLOGY

## 2017-12-27 PROCEDURE — 76801 OB US < 14 WKS SINGLE FETUS: CPT

## 2017-12-27 PROCEDURE — 76817 TRANSVAGINAL US OBSTETRIC: CPT

## 2017-12-27 NOTE — TELEPHONE ENCOUNTER
.Reason for Call:  Other call back    Detailed comments: Patient stated that she got a mychart notification to contact the clinic regarding her lab result. She would like to speak to the nurse or the doctor regarding the result.     Phone Number Patient can be reached at: Cell number on file:    Telephone Information:   Mobile [phone]     Best Time: any    Can we leave a detailed message on this number? YES    Call taken on 12/27/2017 at 5:09 PM by Fareed Brown

## 2017-12-28 ENCOUNTER — HOSPITAL ENCOUNTER (EMERGENCY)
Facility: CLINIC | Age: 32
Discharge: HOME | End: 2017-12-28
Attending: EMERGENCY MEDICINE | Admitting: EMERGENCY MEDICINE
Payer: COMMERCIAL

## 2017-12-28 ENCOUNTER — APPOINTMENT (OUTPATIENT)
Dept: ULTRASOUND IMAGING | Facility: CLINIC | Age: 32
End: 2017-12-28
Attending: EMERGENCY MEDICINE
Payer: COMMERCIAL

## 2017-12-28 VITALS
RESPIRATION RATE: 15 BRPM | SYSTOLIC BLOOD PRESSURE: 118 MMHG | TEMPERATURE: 98.3 F | DIASTOLIC BLOOD PRESSURE: 65 MMHG | OXYGEN SATURATION: 100 %

## 2017-12-28 DIAGNOSIS — O03.9 FETAL DEMISE DUE TO MISCARRIAGE: ICD-10-CM

## 2017-12-28 LAB
ABO + RH BLD: NORMAL
ABO + RH BLD: NORMAL
BASOPHILS # BLD AUTO: 0 10E9/L (ref 0–0.2)
BASOPHILS NFR BLD AUTO: 0.3 %
BLD GP AB SCN SERPL QL: NORMAL
BLOOD BANK CMNT PATIENT-IMP: NORMAL
DIFFERENTIAL METHOD BLD: NORMAL
EOSINOPHIL # BLD AUTO: 0.1 10E9/L (ref 0–0.7)
EOSINOPHIL NFR BLD AUTO: 1.2 %
ERYTHROCYTE [DISTWIDTH] IN BLOOD BY AUTOMATED COUNT: 13.9 % (ref 10–15)
HCT VFR BLD AUTO: 36.9 % (ref 35–47)
HGB BLD-MCNC: 12.4 G/DL (ref 11.7–15.7)
IMM GRANULOCYTES # BLD: 0 10E9/L (ref 0–0.4)
IMM GRANULOCYTES NFR BLD: 0.1 %
LYMPHOCYTES # BLD AUTO: 2.2 10E9/L (ref 0.8–5.3)
LYMPHOCYTES NFR BLD AUTO: 29.8 %
MCH RBC QN AUTO: 29.4 PG (ref 26.5–33)
MCHC RBC AUTO-ENTMCNC: 33.6 G/DL (ref 31.5–36.5)
MCV RBC AUTO: 87 FL (ref 78–100)
MONOCYTES # BLD AUTO: 0.6 10E9/L (ref 0–1.3)
MONOCYTES NFR BLD AUTO: 8.7 %
NEUTROPHILS # BLD AUTO: 4.4 10E9/L (ref 1.6–8.3)
NEUTROPHILS NFR BLD AUTO: 59.9 %
NRBC # BLD AUTO: 0 10*3/UL
NRBC BLD AUTO-RTO: 0 /100
PLATELET # BLD AUTO: 208 10E9/L (ref 150–450)
RBC # BLD AUTO: 4.22 10E12/L (ref 3.8–5.2)
SPECIMEN EXP DATE BLD: NORMAL
WBC # BLD AUTO: 7.4 10E9/L (ref 4–11)

## 2017-12-28 PROCEDURE — 86900 BLOOD TYPING SEROLOGIC ABO: CPT | Performed by: EMERGENCY MEDICINE

## 2017-12-28 PROCEDURE — 85025 COMPLETE CBC W/AUTO DIFF WBC: CPT | Performed by: EMERGENCY MEDICINE

## 2017-12-28 PROCEDURE — 86850 RBC ANTIBODY SCREEN: CPT | Performed by: EMERGENCY MEDICINE

## 2017-12-28 PROCEDURE — 86901 BLOOD TYPING SEROLOGIC RH(D): CPT | Performed by: EMERGENCY MEDICINE

## 2017-12-28 PROCEDURE — 76817 TRANSVAGINAL US OBSTETRIC: CPT

## 2017-12-28 PROCEDURE — 36415 COLL VENOUS BLD VENIPUNCTURE: CPT

## 2017-12-28 PROCEDURE — 99285 EMERGENCY DEPT VISIT HI MDM: CPT | Mod: 25

## 2017-12-28 RX ORDER — HYDROCODONE BITARTRATE AND ACETAMINOPHEN 5; 325 MG/1; MG/1
1-2 TABLET ORAL EVERY 4 HOURS PRN
Qty: 15 TABLET | Refills: 0 | Status: SHIPPED | OUTPATIENT
Start: 2017-12-28 | End: 2018-05-31

## 2017-12-28 ASSESSMENT — ENCOUNTER SYMPTOMS
LIGHT-HEADEDNESS: 0
DYSURIA: 0
DIZZINESS: 0
ABDOMINAL PAIN: 0

## 2017-12-28 NOTE — TELEPHONE ENCOUNTER
Entered by Ashley Mauricio APRN CNP at 12/27/2017  1:05 PM   Read by Ebony Jo at 12/27/2017  4:34 PM   Ebony,     Your pregnancy hormone level has increased since it was last checked. I sent you a message about your ultrasound as I was not able to leave you a voicemail. Please call the clinic to discuss your ultrasound and lab result in more detail and we can also discuss follow up at that time as well.        Entered by Ashley Mauricio APRN CNP at 12/27/2017 11:52 AM   Read by Ebony Jo at 12/27/2017  4:56 PM   Ebony,     Your ultrasound confirms a viable pregnancy at just over 6 weeks gestation and the estimated due date is 8/18/18. There are 2 small hemorrhages visible on the ultrasound-I attempted to contact you to discuss this and was not able to leave a voicemail. Please call the clinic to discuss this further.      This writer attempted to contact patient on 12/28/17      Reason for call about labs & US report and left message to return call.      If patient calls back:   Patient contacted by clinic RN team. Inform patient that someone from the team will contact them, document that pt called and route to care team. .    Will also route to JULIANNA Pizano CNP incase she wants to attempt to contact patient.    Toyin Baez RN

## 2017-12-28 NOTE — TELEPHONE ENCOUNTER
"Patient called back. Explained JULIANNA Pizano CNP is not in clinic this afternoon but will give her results and then JULIANNA Pizano CNP can Gave results to patient as noted below. Patient stated she wants to know if her baby is \"Ok or not.\" Patient then stated she is in [facility] ER now. Patient reported noting some pinkish blood on toilet tissue when wiping after voiding. 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   Explained will let patient continue with her care in ER and let JULIANNA Pizano CNP know. JULIANNA Pizano CNP will probably call patient tomorrow when she returns to clinic. Patient agreed to follow plan.   Will route to JULIANNA Pizano CNP for review & orders. Toyin Baez RN, BAN      "

## 2017-12-28 NOTE — ED AVS SNAPSHOT
Emergency Department    [address] [facility] [address]    Phone:  [phone]    Fax:  [phone]                                       Ebony Jo   MRN: [id number]    Department:   Emergency Department   Date of Visit:  12/28/2017           Patient Information     Date Of Birth          1985        Your diagnoses for this visit were:     Fetal demise due to miscarriage        You were seen by Zack Rausch MD.      Follow-up Information     Schedule an appointment as soon as possible for a visit with Desire Dumont MD.    Specialty:  Family Practice    Contact information:    [address]  [place] [address] [phone]          Follow up with  Emergency Department.    Specialty:  EMERGENCY MEDICINE    Why:  If symptoms worsen    Contact information:    [address] [facility] [id number] [phone]        Discharge Instructions         Understanding Miscarriage: Emotions  Miscarriage is the unplanned end of a pregnancy that happens before you reach 20 weeks. When a miscarriage happens, you re likely to have a wide range of feelings. Allow yourself to accept how you feel. Only then can you begin to move on.    No one is to blame  Know that you did not cause this to happen. Miscarriage is very common. There is a 15% chance of miscarriage with each pregnancy (after pregnancy has been diagnosed). Miscarriage usually takes place during the first 10 weeks after conception.  Grief takes many forms  Grief may be the first thing you feel, or it may come upon you later. Perhaps you ll grieve because the future you hoped for is lost. Grief is painful and often lonely. But your miscarriage should become easier to deal with over time.  What you feel is OK  No one can tell you how to respond to your miscarriage. If you have been trying to have a child, this loss may feel overwhelming. Perhaps this was an unplanned pregnancy. That doesn t  mean you won t feel loss. You know yourself best. It s OK to feel whatever you feel.  A sense of loss  No matter what you thought about being pregnant, having a miscarriage may cause a sense of loss. You may feel as if something is missing. It s OK if you can t describe how you feel. At first, it may be enough just to look inside yourself and feel your emotions.  Partner s note  Men grieve, too. You may be feeling sad, helpless or frustrated. When you re struggling with your own feelings, knowing how to help your partner may be hard. But do your best to provide support. The following tips may also help:    Be kind to yourself and your partner.    Spend time together.    Fix a meal or bring dinner home for her.    Rent a movie.    If you have children, spend extra time with them.   Date Last Reviewed: 6/1/2016 2000-2017 [employer] [employer]. [address] [place] [address]. All rights reserved. This information is not intended as a substitute for professional medical care. Always follow your healthcare professional's instructions.          Understanding Miscarriage: Possible Causes  Miscarriage is common, but finding its cause may not be easy. If a cause can be found, it s likely to be a problem with the baby or the structure of the uterus. Other factors cause miscarriage, but they are less common.  Problems with the baby  Either of the following problems with the baby can cause a miscarriage:    There is a problem with the baby s chromosomes (genes that carry the information needed for life).    Birth defects  Problems with the uterus or cervix  Any of the following problems with the uterus or cervix can cause a miscarriage:    The uterus may be divided (have a septum), or have fibroids or adhesions.    The lining of the uterus may be too thin for the fertilized egg to implant.    The cervix may be too weak to support the weight of a pregnancy.  Other factors  Any of the following problems can  cause a miscarriage:    A serious illness, such as uncontrolled dabetes mellitus.     A bad injury, perhaps during a car accident.    Exposure to toxins or radiation.  What does not cause miscarriage  Plenty of myths and  old wives  tales  try to explain the cause of miscarriage. But they are fiction--not fact. None of the following activities causes miscarriage:    Carrying groceries    Lifting a small child    Wearing high heels    Coloring your hair    Having sex    Vacuuming   Working outside the home    Being a vegetarian    Eating spicy foods    Having a Pap smear    Riding a horse or a bicycle    Wishing away or denying a pregnancy       Date Last Reviewed: 6/1/2016 2000-2017 [employer]. [address] [place] [address], [place] [id number]. All rights reserved. This information is not intended as a substitute for professional medical care. Always follow your healthcare professional's instructions.          Future Appointments        Provider Department Dept Phone Center    1/16/2018 10:00 AM Fredy Leal MD [facility] [phone] [facility]      24 Hour Appointment Hotline       To make an appointment at any [facility], call [phone] ([phone]). If you don't have a family doctor or clinic, we will help you find one. [facility] are conveniently located to serve the needs of you and your family.             Review of your medicines      START taking        Dose / Directions Last dose taken    HYDROcodone-acetaminophen 5-325 MG per tablet   Commonly known as:  NORCO   Dose:  1-2 tablet   Quantity:  15 tablet        Take 1-2 tablets by mouth every 4 hours as needed   Refills:  0          Our records show that you are taking the medicines listed below. If these are incorrect, please call your family doctor or clinic.        Dose / Directions Last dose taken    ADVIL PO        Reported on 3/6/2017   Refills:  0        prenatal multivitamin plus iron 27-0.8  MG Tabs per tablet   Dose:  1 tablet        Take 1 tablet by mouth daily   Refills:  0                Prescriptions were sent or printed at these locations (1 Prescription)                   Other Prescriptions                Printed at Department/Unit printer (1 of 1)         HYDROcodone-acetaminophen (NORCO) 5-325 MG per tablet                Procedures and tests performed during your visit     ABO/Rh type and screen    CBC with platelets differential    US OB <14 Weeks W Transvaginal      Orders Needing Specimen Collection     None      Pending Results     No orders found from 12/26/2017 to 12/29/2017.            Pending Culture Results     No orders found from 12/26/2017 to 12/29/2017.            Pending Results Instructions     If you had any lab results that were not finalized at the time of your Discharge, you can call the ED Lab Result RN at [phone]. You will be contacted by this team for any positive Lab results or changes in treatment. The nurses are available 7 days a week from 10A to 6:30P.  You can leave a message 24 hours per day and they will return your call.        Test Results From Your Hospital Stay        12/28/2017  2:20 PM      Narrative     OBSTETRIC ULTRASOUND WITH ENDOVAGINAL TRANSDUCER  12/28/2017 1:37 PM     HISTORY: Vaginal bleeding, pregnant, known subchorionic hemorrhage.   Gestational age by LMP 6 weeks 5 days.    TECHNIQUE:  Endovaginal sonography was added to the transabdominal  exam to better evaluate the uterus and ovaries because of inadequate  bladder fullness.    COMPARISON: Ultrasound 12/27/2017.    FINDINGS: There is a gestational sac within the uterus containing an  embryo. The crown-rump length of the embryo measures 7.8 mm which  corresponds to estimated gestational age of 6 weeks 5 days by today's  ultrasound measurements. This has increased by 1 day compared to  measurements from ultrasound on the day prior. No fetal cardiac  activity is identified although cardiac  activity was seen on the  previous ultrasound.    Again seen are bilateral subchorionic hemorrhages which now  demonstrate hematocrit layers measuring 1.1 x 1.3 x 0.7 cm on the  right, previously 1.9 x 0.7 x 0.8 and 0.8 x 1.3 x 0.4 cm on the left,  previously 0.7 x 0.4 x 0.9 cm.    Hypoechoic lesion in the posterior uterine fundus likely representing  a fibroid is again seen measuring 1.7 x 2.1 x 1.3 cm.    The ovaries are unremarkable in appearance with a right-sided corpus  luteal cyst.        Impression     IMPRESSION:   1. Single intrauterine pregnancy without fetal cardiac activity  identified. There was cardiac activity identified on the previous  ultrasound from the day prior. Findings are concerning for fetal  demise. Recommend correlation with beta-hCG levels and followup  ultrasound as needed.  2. Bilateral subchorionic hemorrhages which now demonstrate hematocrit  layers.  3. Posterior fundal myometrial fibroid.    TIKI MARINELLI MD         12/28/2017  1:29 PM      Component Results     Component Value Ref Range & Units Status    WBC 7.4 4.0 - 11.0 10e9/L Final    RBC Count 4.22 3.8 - 5.2 10e12/L Final    Hemoglobin 12.4 11.7 - 15.7 g/dL Final    Hematocrit 36.9 35.0 - 47.0 % Final    MCV 87 78 - 100 fl Final    MCH 29.4 26.5 - 33.0 pg Final    MCHC 33.6 31.5 - 36.5 g/dL Final    RDW 13.9 10.0 - 15.0 % Final    Platelet Count 208 150 - 450 10e9/L Final    Diff Method Automated Method  Final    % Neutrophils 59.9 % Final    % Lymphocytes 29.8 % Final    % Monocytes 8.7 % Final    % Eosinophils 1.2 % Final    % Basophils 0.3 % Final    % Immature Granulocytes 0.1 % Final    Nucleated RBCs 0 0 /100 Final    Absolute Neutrophil 4.4 1.6 - 8.3 10e9/L Final    Absolute Lymphocytes 2.2 0.8 - 5.3 10e9/L Final    Absolute Monocytes 0.6 0.0 - 1.3 10e9/L Final    Absolute Eosinophils 0.1 0.0 - 0.7 10e9/L Final    Absolute Basophils 0.0 0.0 - 0.2 10e9/L Final    Abs Immature Granulocytes 0.0 0 - 0.4 10e9/L Final     Absolute Nucleated RBC 0.0  Final         12/28/2017  2:08 PM      Component Results     Component Value Ref Range & Units Status    ABO O  Final    RH(D) Pos  Final    Antibody Screen Neg  Final    Test Valid Only At [facility]     Final    Specimen Expires 12/31/2017  Final                Clinical Quality Measure: Blood Pressure Screening     Your blood pressure was checked while you were in the emergency department today. The last reading we obtained was  BP: 118/65 . Please read the guidelines below about what these numbers mean and what you should do about them.  If your systolic blood pressure (the top number) is less than 120 and your diastolic blood pressure (the bottom number) is less than 80, then your blood pressure is normal. There is nothing more that you need to do about it.  If your systolic blood pressure (the top number) is 120-139 or your diastolic blood pressure (the bottom number) is 80-89, your blood pressure may be higher than it should be. You should have your blood pressure rechecked within a year by a primary care provider.  If your systolic blood pressure (the top number) is 140 or greater or your diastolic blood pressure (the bottom number) is 90 or greater, you may have high blood pressure. High blood pressure is treatable, but if left untreated over time it can put you at risk for heart attack, stroke, or kidney failure. You should have your blood pressure rechecked by a primary care provider within the next 4 weeks.  If your provider in the emergency department today gave you specific instructions to follow-up with your doctor or provider even sooner than that, you should follow that instruction and not wait for up to 4 weeks for your follow-up visit.        Thank you for choosing [facility]       Thank you for choosing [facility] for your care. Our goal is always to provide you with excellent care. Hearing back from our patients is one way we can continue to improve our  services. Please take a few minutes to complete the written survey that you may receive in the mail after you visit with us. Thank you!        [employer] Information     [employer] gives you secure access to your electronic health record. If you see a primary care provider, you can also send messages to your care team and make appointments. If you have questions, please call your primary care clinic.  If you do not have a primary care provider, please call [phone] and they will assist you.        Care EveryWhere ID     This is your Care EveryWhere ID. This could be used by other organizations to access your [facility] medical records  [id number]        Equal Access to Services     BRIDGET BARTLETT : Inoecncia Vasquez, jeffry castillo, agapito damon. So [facility] [phone].    ATENCIÓN: Si habla español, tiene a french disposición servicios gratuitos de asistencia lingüística. Llame al [phone].    We comply with applicable federal civil rights laws and [place] laws. We do not discriminate on the basis of race, color, national origin, age, disability, sex, sexual orientation, or gender identity.            After Visit Summary       This is your record. Keep this with you and show to your community pharmacist(s) and doctor(s) at your next visit.

## 2017-12-28 NOTE — ED AVS SNAPSHOT
Emergency Department    [address] [place] [address]    Phone:  [phone]    Fax:  [phone]                                       Ebony Jo   MRN: [id number]    Department:   Emergency Department   Date of Visit:  12/28/2017           After Visit Summary Signature Page     I have received my discharge instructions, and my questions have been answered. I have discussed any challenges I see with this plan with the nurse or doctor.    ..........................................................................................................................................  Patient/Patient Representative Signature      ..........................................................................................................................................  Patient Representative Print Name and Relationship to Patient    ..................................................               ................................................  Date                                            Time    ..........................................................................................................................................  Reviewed by Signature/Title    ...................................................              ..............................................  Date                                                            Time

## 2017-12-28 NOTE — ED PROVIDER NOTES
"  History   Chief Complaint:  Vaginal Bleeding    HPI   Ebony Jo is a 32 year old female who is six weeks five days pregnant who presents to the emergency department today for evaluation of vaginal bleeding. The patient reports that she has been having some mild vaginal bleeding throughout her pregnancy. Yesterday, 12/27/2017, the patient had ultrasound imaging showing a single living intrauterine pregnancy and [place] [address]. This morning at 0900 the patient reports a \"gush\" of vaginal bleeding while she was at work here at [employer]. She reports going through one pad, and noticed the blood was pinkish brown with clotting. The patient reports no dysuria, lightheadedness, dizziness, or cramping. The patient reports that she has not eaten very much today. She reports that the primary reason she presented to the emergency department today was out of concern for her baby, at a gestational age of 6 weeks, 4 days.     12/27/2017 - US OB <14 Weeks with transvaginal single  1. Single living intrauterine fetal pole at ultrasound gestational age  of 6 weeks, 4 days. This corresponds to ultrasound EDC of 8/18/2018.  2. 2 small areas of subchorionic hemorrhage are seen.  3. Fibroid seen posteriorly near the fundus.  AGUSTIN ESCOBEDO MD  Reading per radiology    Allergies:  No Known Drug Allergies     Medications:    Ibuprofen     Past Medical History:    Hemifacial spasm, right  Malaria  Migraine without aura  Seasonal allergies     Past Surgical History:    Appendectomy    Family History:    Mother: Diabetes   Father: Alzheimer's Disease   Brother: Diabetes     Social History:  The patient was accompanied to the ED by herself.  Smoking Status: Never Smoker  Smokeless Tobacco: Never Used  Alcohol Use: Positive  Marital Status:      Review of Systems   Gastrointestinal: Negative for abdominal pain.   Genitourinary: Positive for vaginal bleeding. Negative for dysuria.   Neurological: Negative " for dizziness and light-headedness.   All other systems reviewed and are negative.    Physical Exam     Patient Vitals for the past 24 hrs:   BP Temp Temp src Heart Rate Resp SpO2   12/28/17 1244 118/65 98.3  F (36.8  C) Oral 83 15 100 %     Physical Exam  Nursing note and vitals reviewed.  Constitutional:  Oriented to person, place, and time. Cooperative.   HENT:   Nose:    Nose normal.   Mouth/Throat:   Mucous membranes are normal.   Eyes:    Conjunctivae normal and EOM are normal.      Pupils are equal, round, and reactive to light.   Neck:    Trachea normal.   Cardiovascular:  Normal rate, regular rhythm, normal heart sounds and normal pulses. No murmur heard.  Pulmonary/Chest:  Effort normal and breath sounds normal.   Abdominal:   Soft. Normal appearance and bowel sounds are normal.      There is no tenderness.      There is no rebound and no CVA tenderness.   Musculoskeletal:  Extremities atraumatic x 4.   Lymphadenopathy:  No cervical adenopathy.   Neurological:   Alert and oriented to person, place, and time. Normal strength.      No cranial nerve deficit or sensory deficit. GCS eye subscore is 4. GCS verbal subscore is 5. GCS motor subscore is 6.   Skin:    Skin is intact. No rash noted.   Psychiatric:   Normal mood and affect.    Emergency Department Course     Imaging:  Radiology findings were communicated with the patient who voiced understanding of the findings.    US OB <14 Weeks W Transvaginal  1. Single intrauterine pregnancy without fetal cardiac activity  identified. There was cardiac activity identified on the previous  ultrasound from the day prior. Findings are concerning for fetal  demise. Recommend correlation with beta-hCG levels and followup  ultrasound as needed.  2. Bilateral subchorionic hemorrhages which now demonstrate hematocrit  layers.  3. Posterior fundal myometrial fibroid.  TIKI MARINELLI MD  Reading per radiology    Laboratory:  Laboratory findings were communicated with the  patient who voiced understanding of the findings.    CBC: WBC 7.4, HGB 12.4,   ABO/Rh type and screen: ABO: O, Rh(D): Positive, Antibody Screen: Negative      Emergency Department Course:    1239 Nursing notes and vitals reviewed.    1249 I performed an exam of the patient as documented above.     1300 IV was inserted and blood was drawn for laboratory testing, results above.     1309 The patient was sent for a US OB <14 Weeks W Transvaginal while in the emergency  department, results above.    1433  I personally reviewed the laboratory and imaging results with the patient and answered all  related questions prior to discharge.    Impression & Plan      Medical Decision Making:  Ebony Jo is a 32 year old female who presents to the emergency department today for worsening vaginal bleeding in her early pregnancy. She had an ultrasound and a quantitative HCG obtained yesterday, which were unremarkable and showed a live IUP with cardiac activity. It also did show a subchorionic hemorrhage though. I agreed to repeat the ultrasound today and then checked her CBC and Rh type. Unfortunately, her ultrasound shows no fetal cardiac activity today. This is consistent with intrauterine fetal demise. I agreed to provide the patient a small prescription for Norco in case she has significant pain with passage of tissue. I instructed her to follow up with her gynecologist as soon as possible and certainly return to the emergency department though with any significant pain or bleeding.     Diagnosis:    ICD-10-CM    1. Fetal demise due to miscarriage O03.9      Disposition:   The patient is discharged to home.    Discharge Medications:  Discharge Medication List as of 12/28/2017  2:39 PM      START taking these medications    Details   HYDROcodone-acetaminophen (NORCO) 5-325 MG per tablet Take 1-2 tablets by mouth every 4 hours as needed, Disp-15 tablet, R-0, Local Print           Scribe Disclosure:  I, Kenneth Barclay, am serving  as a scribe at 12:41 PM on 12/28/2017 to document services personally performed by Zack Rausch MD, based on my observations and the provider's statements to me.      EMERGENCY DEPARTMENT       Zack Rausch MD  12/28/17 8315

## 2017-12-28 NOTE — DISCHARGE INSTRUCTIONS
Understanding Miscarriage: Emotions  Miscarriage is the unplanned end of a pregnancy that happens before you reach 20 weeks. When a miscarriage happens, you re likely to have a wide range of feelings. Allow yourself to accept how you feel. Only then can you begin to move on.    No one is to blame  Know that you did not cause this to happen. Miscarriage is very common. There is a 15% chance of miscarriage with each pregnancy (after pregnancy has been diagnosed). Miscarriage usually takes place during the first 10 weeks after conception.  Grief takes many forms  Grief may be the first thing you feel, or it may come upon you later. Perhaps you ll grieve because the future you hoped for is lost. Grief is painful and often lonely. But your miscarriage should become easier to deal with over time.  What you feel is OK  No one can tell you how to respond to your miscarriage. If you have been trying to have a child, this loss may feel overwhelming. Perhaps this was an unplanned pregnancy. That doesn t mean you won t feel loss. You know yourself best. It s OK to feel whatever you feel.  A sense of loss  No matter what you thought about being pregnant, having a miscarriage may cause a sense of loss. You may feel as if something is missing. It s OK if you can t describe how you feel. At first, it may be enough just to look inside yourself and feel your emotions.  Partner s note  Men grieve, too. You may be feeling sad, helpless or frustrated. When you re struggling with your own feelings, knowing how to help your partner may be hard. But do your best to provide support. The following tips may also help:    Be kind to yourself and your partner.    Spend time together.    Fix a meal or bring dinner home for her.    Rent a movie.    If you have children, spend extra time with them.   Date Last Reviewed: 6/1/2016 2000-2017 [employer]. [address] [facility], [place] [address]. All rights reserved. This  information is not intended as a substitute for professional medical care. Always follow your healthcare professional's instructions.          Understanding Miscarriage: Possible Causes  Miscarriage is common, but finding its cause may not be easy. If a cause can be found, it s likely to be a problem with the baby or the structure of the uterus. Other factors cause miscarriage, but they are less common.  Problems with the baby  Either of the following problems with the baby can cause a miscarriage:    There is a problem with the baby s chromosomes (genes that carry the information needed for life).    Birth defects  Problems with the uterus or cervix  Any of the following problems with the uterus or cervix can cause a miscarriage:    The uterus may be divided (have a septum), or have fibroids or adhesions.    The lining of the uterus may be too thin for the fertilized egg to implant.    The cervix may be too weak to support the weight of a pregnancy.  Other factors  Any of the following problems can cause a miscarriage:    A serious illness, such as uncontrolled dabetes mellitus.     A bad injury, perhaps during a car accident.    Exposure to toxins or radiation.  What does not cause miscarriage  Plenty of myths and  old wives  tales  try to explain the cause of miscarriage. But they are fiction--not fact. None of the following activities causes miscarriage:    Carrying groceries    Lifting a small child    Wearing high heels    Coloring your hair    Having sex    Vacuuming   Working outside the home    Being a vegetarian    Eating spicy foods    Having a Pap smear    Riding a horse or a bicycle    Wishing away or denying a pregnancy       Date Last Reviewed: 6/1/2016 2000-2017 [employer]. [address] [place] [address], [place] [address]. All rights reserved. This information is not intended as a substitute for professional medical care. Always follow your healthcare professional's instructions.

## 2017-12-28 NOTE — LETTER
December 28, 2017      To Whom It May Concern:      Ebony Jo was seen in our Emergency Department today, 12/28/17.  Please excuse her from work for three days.    Sincerely,        Zack Rausch MD

## 2017-12-29 PROBLEM — O20.8 SUBCHORIONIC HEMORRHAGE IN FIRST TRIMESTER: Status: ACTIVE | Noted: 2017-12-29

## 2017-12-29 NOTE — TELEPHONE ENCOUNTER
Telephone call to patient-reviewed her ultrasound results from ED yesterday. States she is not accepting of results at this time. Has not had any further bleeding since her ED visit yesterday and wants to continue to monitor this until she is sure it is not a viable pregnancy. We did discuss the positive cardiac activity on ultrasound 2 days ago and the absence of cardiac activity yesterday. Patient will plan repeat Quant HCG today as this was not done in the ED yesterday and then schedule follow up appointment in clinic next week to discuss results, management. Patient aware of need to call or proceed to ED if she experiences heavy bleeding, severe cramping, passage of large clots/tissue. She is Rh positive. Patient is given an opportunity to ask questions and have them answered. Ashley LEVINE CNP

## 2017-12-30 DIAGNOSIS — Z32.01 PREGNANCY TEST POSITIVE: ICD-10-CM

## 2017-12-30 DIAGNOSIS — O20.0 THREATENED SPONTANEOUS ABORTION: ICD-10-CM

## 2017-12-30 PROCEDURE — 36415 COLL VENOUS BLD VENIPUNCTURE: CPT | Performed by: OBSTETRICS & GYNECOLOGY

## 2017-12-30 PROCEDURE — 84702 CHORIONIC GONADOTROPIN TEST: CPT | Performed by: OBSTETRICS & GYNECOLOGY

## 2018-01-01 LAB — B-HCG SERPL-ACNC: ABNORMAL IU/L (ref 0–5)

## 2018-01-04 ENCOUNTER — TELEPHONE (OUTPATIENT)
Dept: OBGYN | Facility: CLINIC | Age: 33
End: 2018-01-04

## 2018-01-04 NOTE — TELEPHONE ENCOUNTER
"Phone call to patient. Patient stated the pain is \"better today.\" Patient stated she is bleeding has not been heavy-like a \"normal\" period. She stated she was passing clots yesterday but not today. Patient stated has been resting at home and would like to not have to return to work until 01-08-18. Patient stated she needs a letter for her employer. Recommended she be seen in clinic for f/u. Patient agreed. Scheduled with Dr. Leal tomorrow at 0830. Patient will call back if sxs change or worsen. Patient appreciative of assistance. Toyin Baez RN, BAN     "

## 2018-01-04 NOTE — TELEPHONE ENCOUNTER
Reason for Call:  Other call back    Detailed comments: Pt calling for she would like a call back for she is in pain and would like advisement on what to do next. She believes she might be having a miscarriage and has concerns regarding her upcoming ultrasound apt that she would like to address.    Phone Number Patient can be reached at: Home number on file [phone] (home)    Best Time: anytime    Can we leave a detailed message on this number? YES    Call taken on 1/4/2018 at 11:42 AM by Geoffrey Deutsch

## 2018-01-05 ENCOUNTER — OFFICE VISIT (OUTPATIENT)
Dept: OBGYN | Facility: CLINIC | Age: 33
End: 2018-01-05
Payer: COMMERCIAL

## 2018-01-05 VITALS
DIASTOLIC BLOOD PRESSURE: 61 MMHG | WEIGHT: 170 LBS | TEMPERATURE: 97.2 F | BODY MASS INDEX: 29.18 KG/M2 | SYSTOLIC BLOOD PRESSURE: 107 MMHG | HEART RATE: 64 BPM

## 2018-01-05 DIAGNOSIS — O03.4 INCOMPLETE SPONTANEOUS ABORTION: Primary | ICD-10-CM

## 2018-01-05 PROCEDURE — 99214 OFFICE O/P EST MOD 30 MIN: CPT | Performed by: OBSTETRICS & GYNECOLOGY

## 2018-01-05 NOTE — MR AVS SNAPSHOT
After Visit Summary   1/5/2018    Ebony Jo    MRN: [id number]           Patient Information     Date Of Birth          1985        Visit Information        Provider Department      1/5/2018 8:30 AM Fredy Leal MD [facility]        Care Instructions                                                        If you have any questions regarding your visit, Please contact your care team.     [place] [facility]: [phone]    [facility] CLINIC HOURS TELEPHONE NUMBER       CHAY Costa-      Eugene Campbell-Medical Assistant       Monday-[place]  8:00a.m-4:45 p.m  Wednesday-[place] 8:00a.m-4:45 p.m.  Thursday-[place]  8:00a.m-4:45 p.m.  Friday-[place]  8:00a.m-4:45 p.m. [facility]  [address] [place] [address] [place] [address]  [phone] ask for [facility]  [phone] Fax  Imaging [phone]    [facility] Labor and Delivery  [address] [place] [address] [address].  [place], [place] [address] [phone]    [facility]  [address] [place] [address] [phone] [facility]  [phone] Fax  Imaging [phone]     Urgent Care locations:    [facility] Monday-Friday  5 pm - 9 pm  Saturday and Sunday   9 am - 5 pm    Monday-Friday   11 am - 9 pm  Saturday and Sunday   9 am - 5 pm   [phone]       If you need a medication refill, please contact your pharmacy. Please allow 3 business days for your refill to be completed.  As always, Thank you for trusting us with your healthcare needs!            Follow-ups after your visit        Who to contact     If you have questions or need follow up information about today's clinic visit or your schedule please contact [facility] directly at [phone].  Normal or non-critical lab and imaging results will be communicated to you by MyChart, letter  or phone within 4 business days after the clinic has received the results. If you do not hear from us within 7 days, please contact the clinic through Mojeek or phone. If you have a critical or abnormal lab result, we will notify you by phone as soon as possible.  Submit refill requests through Mo[employer]ek or call your pharmacy and they will forward the refill request to us. Please allow 3 business days for your refill to be completed.          Additional Information About Your Visit        Avere [employer]wamy Information     [employer] gives you secure access to your electronic health record. If you see a primary care provider, you can also send messages to your care team and make appointments. If you have questions, please call your primary care clinic.  If you do not have a primary care provider, please call [phone] and they will assist you.        Care EveryWhere ID     This is your Care EveryWhere ID. This could be used by other organizations to access your [place] medical records  [id number]        Your Vitals Were     Pulse Temperature Last Period Breastfeeding? BMI (Body Mass Index)       64 97.2  F (36.2  C) (Oral) 11/11/2017 (Exact Date) No 29.18 kg/m2        Blood Pressure from Last 3 Encounters:   01/05/18 107/61   12/28/17 118/65   12/15/17 109/68    Weight from Last 3 Encounters:   01/05/18 170 lb (77.1 kg)   12/15/17 166 lb (75.3 kg)   12/12/17 167 lb (75.8 kg)              Today, you had the following     No orders found for display       Primary Care Provider Office Phone # Fax #    Desire Elis Waldron [phone]       [address]  [facility] [address]        Equal Access to Services     [facility]: Hadii aad ku hadasho Soomaali, waaxda luqadaha, qaybta kaalmada adeegyada, agapito razo . So [facility] [phone].    ATENCIÓN: Si habla español, tiene a frecnh disposición servicios gratuitos de asistencia lingüística. Llame al [phone].    We  comply with applicable federal civil rights laws and [place] laws. We do not discriminate on the basis of race, color, national origin, age, disability, sex, sexual orientation, or gender identity.            Thank you!     Thank you for choosing [facility]  for your care. Our goal is always to provide you with excellent care. Hearing back from our patients is one way we can continue to improve our services. Please take a few minutes to complete the written survey that you may receive in the mail after your visit with us. Thank you!             Your Updated Medication List - Protect others around you: Learn how to safely use, store and throw away your medicines at www.disposemymeds.org.          This list is accurate as of: 1/5/18  9:19 AM.  Always use your most recent med list.                   Brand Name Dispense Instructions for use Diagnosis    ADVIL PO      Reported on 3/6/2017        HYDROcodone-acetaminophen 5-325 MG per tablet    NORCO    15 tablet    Take 1-2 tablets by mouth every 4 hours as needed        prenatal multivitamin plus iron 27-0.8 MG Tabs per tablet      Take 1 tablet by mouth daily

## 2018-01-05 NOTE — PATIENT INSTRUCTIONS
If you have any questions regarding your visit, Please contact your care team.     [place] [facility]: [phone]    [facility] CLINIC HOURS TELEPHONE NUMBER       CHAY Costa-      Eugene Campbell-Medical Assistant       Monday-[place]  8:00a.m-4:45 p.m  Wednesday-[place] 8:00a.m-4:45 p.m.  Thursday-[place]  8:00a.m-4:45 p.m.  Friday-[place]  8:00a.m-4:45 p.m. [facility]  [address] [place] [address]  [phone] ask [facility]  [phone] Fax  Imaging [phone]    [facility] Labor and Delivery  [address] [place] [address] [place] [address]  [phone]    [facility]  [address] [place] [address]  [phone] [facility]  [phone] Fax  Imaging [phone]     Urgent Care locations:    [place]        [place] Monday-Friday  5 pm - 9 pm  Saturday and Sunday   9 am - 5 pm    Monday-Friday   11 am - 9 pm  Saturday and Sunday   9 am - 5 pm   [phone]       If you need a medication refill, please contact your pharmacy. Please allow 3 business days for your refill to be completed.  As always, Thank you for trusting us with your healthcare needs!

## 2018-01-05 NOTE — NURSING NOTE
"Chief Complaint   Patient presents with     Results     Hospital follow-up US and HCG       Initial /61 (BP Location: Left arm, Patient Position: Chair, Cuff Size: Adult Regular)  Pulse 64  Temp 97.2  F (36.2  C) (Oral)  Wt 170 lb (77.1 kg)  LMP 11/11/2017 (Exact Date)  Breastfeeding? No  BMI 29.18 kg/m2 Estimated body mass index is 29.18 kg/(m^2) as calculated from the following:    Height as of 12/12/17: 5' 4\" (1.626 m).    Weight as of this encounter: 170 lb (77.1 kg).  Medication Reconciliation: complete   Shnanan Pagan CMA      "

## 2018-01-06 NOTE — PROGRESS NOTES
OB-GYN Problem-Oriented Visit or Consultation      Ebony Jo is a 32 year old year old P 0 who presents with a chief complaint of follow-up for SAB.  Referred by self.  Patient's last menstrual period was 2017 (exact date).    HPI:     See prior notes. Had a living early IUP on u/s but progressive bleeding and ED evaluation at work showed non-viable IUP. At first patient was not accepting of this diagnosis but now has passed some tissue, continued mod bleeding and cramping, and not feeling pregnancy symptoms anymore. Rh pos. Wants to RTW - forms faxed. Uncertain if trying again. Emotionally doing ok.     Past medical, obstetrical, surgical, family and social history reviewed and as noted or updated in chart.     Allergies, meds and supplements are as noted or updated in chart.      ROS:   Systems reviewed include:  constitutional, breast, cardiac, respiratory, gastrointestinal, genitourinary, psychological, hematologic/lymphatic and endocrine.    These systems were negative for significant symptoms except for the following additional: none; see HPI.    EXAM:  VS as noted. /61 (BP Location: Left arm, Patient Position: Chair, Cuff Size: Adult Regular)  Pulse 64  Temp 97.2  F (36.2  C) (Oral)  Wt 170 lb (77.1 kg)  LMP 2017 (Exact Date)  Breastfeeding? No  BMI 29.18 kg/m2  Constitutionally normal.     Exam not repeated at this time.    Assessment:   Encounter Diagnoses   Name Primary?     Incomplete spontaneous  Yes       I reviewed the condition, causes, differential diagnosis, prognosis, evaluation and management considerations and options.  Questions answered and information given. See orders.         Plan and Recommendations: Explained that SAB may not yet be complete and what to expect. Discussed rechecking u/s prn, expectant observation, possible D&C. Check home UPT in 4 weeks to confirm resolution also and follow-up as needed.     Total encounter time (physician together with  patient) = 25min. Direct counseling, education and care coordination time (physician together with patient) = 15min.     A/P:  Ebony was seen today for results.    Diagnoses and all orders for this visit:    Incomplete spontaneous         Fredy Leal MD

## 2018-05-31 ENCOUNTER — OFFICE VISIT (OUTPATIENT)
Dept: FAMILY MEDICINE | Facility: CLINIC | Age: 33
End: 2018-05-31
Payer: COMMERCIAL

## 2018-05-31 VITALS
HEIGHT: 65 IN | BODY MASS INDEX: 28.16 KG/M2 | OXYGEN SATURATION: 99 % | SYSTOLIC BLOOD PRESSURE: 112 MMHG | TEMPERATURE: 98.6 F | DIASTOLIC BLOOD PRESSURE: 72 MMHG | WEIGHT: 169 LBS | HEART RATE: 61 BPM

## 2018-05-31 DIAGNOSIS — Z32.01 PREGNANCY TEST POSITIVE: Primary | ICD-10-CM

## 2018-05-31 DIAGNOSIS — Z87.59 HISTORY OF MISCARRIAGE: ICD-10-CM

## 2018-05-31 LAB
B-HCG SERPL-ACNC: 301 IU/L (ref 0–5)
BETA HCG QUAL IFA URINE: POSITIVE

## 2018-05-31 PROCEDURE — 84703 CHORIONIC GONADOTROPIN ASSAY: CPT | Performed by: NURSE PRACTITIONER

## 2018-05-31 PROCEDURE — 84702 CHORIONIC GONADOTROPIN TEST: CPT | Performed by: NURSE PRACTITIONER

## 2018-05-31 PROCEDURE — 36415 COLL VENOUS BLD VENIPUNCTURE: CPT | Performed by: NURSE PRACTITIONER

## 2018-05-31 PROCEDURE — 99213 OFFICE O/P EST LOW 20 MIN: CPT | Performed by: NURSE PRACTITIONER

## 2018-05-31 RX ORDER — PRENATAL VIT/IRON FUM/FOLIC AC 27MG-0.8MG
1 TABLET ORAL DAILY
Qty: 100 TABLET | Refills: 3 | Status: SHIPPED | OUTPATIENT
Start: 2018-05-31 | End: 2023-05-18

## 2018-05-31 ASSESSMENT — ANXIETY QUESTIONNAIRES
1. FEELING NERVOUS, ANXIOUS, OR ON EDGE: NOT AT ALL
GAD7 TOTAL SCORE: 3
7. FEELING AFRAID AS IF SOMETHING AWFUL MIGHT HAPPEN: SEVERAL DAYS
2. NOT BEING ABLE TO STOP OR CONTROL WORRYING: NOT AT ALL
3. WORRYING TOO MUCH ABOUT DIFFERENT THINGS: SEVERAL DAYS
IF YOU CHECKED OFF ANY PROBLEMS ON THIS QUESTIONNAIRE, HOW DIFFICULT HAVE THESE PROBLEMS MADE IT FOR YOU TO DO YOUR WORK, TAKE CARE OF THINGS AT HOME, OR GET ALONG WITH OTHER PEOPLE: NOT DIFFICULT AT ALL
5. BEING SO RESTLESS THAT IT IS HARD TO SIT STILL: NOT AT ALL
6. BECOMING EASILY ANNOYED OR IRRITABLE: SEVERAL DAYS

## 2018-05-31 ASSESSMENT — PATIENT HEALTH QUESTIONNAIRE - PHQ9: 5. POOR APPETITE OR OVEREATING: NOT AT ALL

## 2018-05-31 ASSESSMENT — PAIN SCALES - GENERAL: PAINLEVEL: NO PAIN (0)

## 2018-05-31 NOTE — MR AVS SNAPSHOT
After Visit Summary   5/31/2018    Ebony Jo    MRN: [id number]           Patient Information     Date Of Birth          1985        Visit Information        Provider Department      5/31/2018 9:20 AM Cora Boone APRN CNP [facility]        Today's Diagnoses     Pregnancy test positive    -  1    History of miscarriage          Care Instructions    -Take your prenatal vitamin every day (with 400-800 mcg of folic acid)  -Maintain a healthy weight  -Exercise regularly, eat a well-balanced diet, avoid alcohol and potentially dangerous chemicals, and drink caffeine only in moderation (<250 mg) daily  -Avoid tobacco products  -Avoid  ibuprofen, motrin, advil, aleve, or naproxen; can take tylenol (intermittently, not in excess) for pain  -Avoid scooping alec litter (if you do, wash hands thoroughly afterwards)  -Avoid raw meats and fish  -Heat lunch meat to steaming  -Eat fish just 2-3 times a week to avoid excess mercury    If you experience any vaginal bleeding and/or cramping, please call us right away.      At [facility], we strive to deliver an exceptional experience to you, every time we see you.  If you receive a survey in the mail, please send us back your thoughts. We really do value your feedback.    Based on your medical history, these are the current health maintenance/preventive care services that you are due for (some may have been done at this visit.)  Health Maintenance Due   Topic Date Due     MIGRAINE ACTION PLAN  05/04/2001       Suggested websites for health information:  Www.[employer].mytrax : Up to date and easily searchable information on multiple topics.  Www.medlineplus.gov : medication info, interactive tutorials, watch real surgeries online  Www.familydoctor.org : good info from the Academy of Family Physicians  Www.cdc.gov : public health info, travel advisories, epidemics (H1N1)  Www.aap.org : children's health info,  normal development, vaccinations  Www.health.state.[place].us : [place] dept of health, public health issues in [place], N1N1    Your care team:                            Family Medicine Internal Medicine   MD Tony Stovall MD Shantel Branch-Fleming, MD Katya Georgiev PA-C Megan Hill, APRN CNP    Darion Vora MD Pediatrics   James Borden, GLADYS Boone, CNP MD Jami Rene APRN CNP   MD Tamiko Jordan MD Deborah Mielke, MD Kim Thein, APRN [facility]      Clinic hours: Monday - Thursday 7 am-7 pm; Fridays 7 am-5 pm.   Urgent care: Monday - Friday 11 am-9 pm; Saturday and Sunday 9 am-5 pm.  Pharmacy : Monday -Thursday 8 am-8 pm; Friday 8 am-6 pm; Saturday and Sunday 9 am-5 pm.     Clinic: [phone]   Pharmacy: [phone]              Follow-ups after your visit        Additional Services     OB/GYN REFERRAL       Your provider has referred you to:  [facility]: [facility] [phone]   [email or web address]/  [facility]: [facility] [phone]   [email or web address]/     Please be aware that coverage of these services is subject to the terms and limitations of your health insurance plan.  Call member services at your health plan with any benefit or coverage questions.      Please bring the following with you to your appointment:    (1) Any X-Rays, CTs or MRIs which have been performed.  Contact the facility where they were done to arrange for  prior to your scheduled appointment.   (2) List of current medications   (3) This referral request   (4) Any documents/labs given to you for this referral                  Who to contact     If you have questions or need follow up information about today's clinic visit or your schedule please contact [facility] directly at [phone].  Normal or non-critical lab and imaging results will  "be communicated to you by [employer], letter or phone within 4 business days after the clinic has received the results. If you do not hear from us within 7 days, please contact the clinic through StudioSn[employer] or phone. If you have a critical or abnormal lab result, we will notify you by phone as soon as possible.  Submit refill requests through StudioSnaps or call your pharmacy and they will forward the refill request to us. Please allow 3 business days for your refill to be completed.          Additional Information About Your Visit        StudioSnaps Information     Studio[employer] gives you secure access to your electronic health record. If you see a primary care provider, you can also send messages to your care team and make appointments. If you have questions, please call your primary care clinic.  If you do not have a primary care provider, please call [phone] and they will assist you.        Care EveryWhere ID     This is your Care EveryWhere ID. This could be used by other organizations to access your [place] medical records  [id number]        Your Vitals Were     Pulse Temperature Height Last Period Pulse Oximetry BMI (Body Mass Index)    61 98.6  F (37  C) (Oral) 5' 4.75\" (1.645 m) 05/04/2018 (Exact Date) 99% 28.34 kg/m2       Blood Pressure from Last 3 Encounters:   05/31/18 112/72   01/05/18 107/61   12/28/17 118/65    Weight from Last 3 Encounters:   05/31/18 169 lb (76.7 kg)   01/05/18 170 lb (77.1 kg)   12/15/17 166 lb (75.3 kg)              We Performed the Following     Beta HCG Qual, Urine - [facility] and [place] ([id number])     HCG Quantitative Pregnancy, Blood (YGB411)     OB/GYN REFERRAL          Today's Medication Changes          These changes are accurate as of 5/31/18  9:54 AM.  If you have any questions, ask your nurse or doctor.               Stop taking these medicines if you haven't already. Please contact your care team if you have questions.     ADVIL PO   Stopped by:  Cora Boone APRN CNP "           HYDROcodone-acetaminophen 5-325 MG per tablet   Commonly known as:  NORCO   Stopped by:  Cora Boone APRN CNP                Where to get your medicines      These medications were sent to [facility]/pharmacy #[id number] - [place], [place] - [address] [facility]  [address], [place] [address]     Phone:  [phone]     prenatal multivitamin plus iron 27-0.8 MG Tabs per tablet                Primary Care Provider Office Phone # Fax #    Desire Gutierrezharvey Waldron -[phone]       [address]  [facility] [address]        Equal Access to Services     JULIOCESAR BARTLETT : Hadii lulu burch hadasho Soomaali, waaxda luqadaha, qaybta kaalmada adeegyada, agapito razo . So [facility] [phone].    ATENCIÓN: Si habla español, tiene a french disposición servicios gratuitos de asistencia lingüística. Llame al [phone].    We comply with applicable federal civil rights laws and [place] laws. We do not discriminate on the basis of race, color, national origin, age, disability, sex, sexual orientation, or gender identity.            Thank you!     Thank you for choosing [facility]  for your care. Our goal is always to provide you with excellent care. Hearing back from our patients is one way we can continue to improve our services. Please take a few minutes to complete the written survey that you may receive in the mail after your visit with us. Thank you!             Your Updated Medication List - Protect others around you: Learn how to safely use, store and throw away your medicines at www.disposemymeds.org.          This list is accurate as of 5/31/18  9:54 AM.  Always use your most recent med list.                   Brand Name Dispense Instructions for use Diagnosis    prenatal multivitamin plus iron 27-0.8 MG Tabs per tablet     100 tablet    Take 1 tablet by mouth daily    Pregnancy test positive

## 2018-05-31 NOTE — PATIENT INSTRUCTIONS
-Take your prenatal vitamin every day (with 400-800 mcg of folic acid)  -Maintain a healthy weight  -Exercise regularly, eat a well-balanced diet, avoid alcohol and potentially dangerous chemicals, and drink caffeine only in moderation (<250 mg) daily  -Avoid tobacco products  -Avoid  ibuprofen, motrin, advil, aleve, or naproxen; can take tylenol (intermittently, not in excess) for pain  -Avoid scooping alec litter (if you do, wash hands thoroughly afterwards)  -Avoid raw meats and fish  -Heat lunch meat to steaming  -Eat fish just 2-3 times a week to avoid excess mercury    If you experience any vaginal bleeding and/or cramping, please call us right away.      At [facility], we strive to deliver an exceptional experience to you, every time we see you.  If you receive a survey in the mail, please send us back your thoughts. We really do value your feedback.    Based on your medical history, these are the current health maintenance/preventive care services that you are due for (some may have been done at this visit.)  Health Maintenance Due   Topic Date Due     MIGRAINE ACTION PLAN  05/04/2001       Suggested websites for health information:  Www.[employer].org : Up to date and easily searchable information on multiple topics.  Www.medlineplus.gov : medication info, interactive tutorials, watch real surgeries online  Www.familydoctor.org : good info from the Academy of Family Physicians  Www.cdc.gov : public health info, travel advisories, epidemics (H1N1)  Www.aap.org : children's health info, normal development, vaccinations  Www.health.[facility].[place].us : [place] dept of health, public health issues in [place], N1N1    Your care team:                            Family Medicine Internal Medicine   MD Tony Stovall MD Shantel Branch-Fleming, MD Katya Georgiev PA-C Megan Hill, JULIANNA Vora MD Pediatrics   GLADYS Cortes, MD Jami Medeiros APRN  CNP   MD Tamiko Jordan MD Deborah Mielke, MD Kim Thein, APRN [facility]      Clinic hours: Monday - Thursday 7 am-7 pm; Fridays 7 am-5 pm.   Urgent care: Monday - Friday 11 am-9 pm; Saturday and Sunday 9 am-5 pm.  Pharmacy : Monday -Thursday 8 am-8 pm; Friday 8 am-6 pm; Saturday and Sunday 9 am-5 pm.     Clinic: [phone]   Pharmacy: [phone]

## 2018-05-31 NOTE — PROGRESS NOTES
SUBJECTIVE:   Ebony Jo is a 33 year old female who presents to clinic today for the following health issues:    Concern: Confirmation of Pregnancy.   LMP: 05.04.2018  Patient took 2 pregnancy test came back positive.  EGA: 3 weeks 6 days  AMRIANN: 2/8/2019    They were trying, she is very happy about pregnancy though anxious as well given previous miscarriage in December 2017.  +breast tenderness and nausea.  No vomiting.  She is taking her prenatal vitamin.  No pets in the house, does not drink alcohol or smoke.  Drinks minimal caffeine.    Problem list and histories reviewed & adjusted, as indicated.  Additional history: as documented    Patient Active Problem List   Diagnosis     CARDIOVASCULAR SCREENING; LDL GOAL LESS THAN 160     Migraine without aura     Hemifacial spasm, right     Subchorionic hemorrhage in first trimester     Past Surgical History:   Procedure Laterality Date     APPENDECTOMY  1997       Social History   Substance Use Topics     Smoking status: Never Smoker     Smokeless tobacco: Never Used     Alcohol use No      Comment:  0-2  wine  glasses monthly, not in PG     Family History   Problem Relation Age of Onset     DIABETES Mother      Family History Negative Father      Alzheimer Disease Father      DIABETES Brother          Current Outpatient Prescriptions   Medication Sig Dispense Refill     Prenatal Vit-Fe Fumarate-FA (PRENATAL MULTIVITAMIN PLUS IRON) 27-0.8 MG TABS per tablet Take 1 tablet by mouth daily       HYDROcodone-acetaminophen (NORCO) 5-325 MG per tablet Take 1-2 tablets by mouth every 4 hours as needed (Patient not taking: Reported on 5/31/2018) 15 tablet 0     Ibuprofen (ADVIL PO) Reported on 3/6/2017       No Known Allergies  BP Readings from Last 3 Encounters:   05/31/18 112/72   01/05/18 107/61   12/28/17 118/65    Wt Readings from Last 3 Encounters:   05/31/18 169 lb (76.7 kg)   01/05/18 170 lb (77.1 kg)   12/15/17 166 lb (75.3 kg)          Reviewed and updated as needed  "this visit by clinical staff  Tobacco  Allergies  Meds       Reviewed and updated as needed this visit by Provider  Allergies  Meds  Problems         ROS:  Constitutional, HEENT, cardiovascular, pulmonary, gi and gu systems are negative, except as otherwise noted.    OBJECTIVE:     /72 (BP Location: Left arm, Patient Position: Chair, Cuff Size: Adult Regular)  Pulse 61  Temp 98.6  F (37  C) (Oral)  Ht 5' 4.75\" (1.645 m)  Wt 169 lb (76.7 kg)  LMP 05/04/2018 (Exact Date)  SpO2 99%  BMI 28.34 kg/m2  Body mass index is 28.34 kg/(m^2).  GENERAL: healthy, alert and no distress  NECK: no adenopathy, no asymmetry, masses, or scars and thyroid normal to palpation  RESP: lungs clear to auscultation - no rales, rhonchi or wheezes  CV: regular rate and rhythm, normal S1 S2, no S3 or S4, no murmur, click or rub, no peripheral edema and peripheral pulses strong  ABDOMEN: soft, nontender, no hepatosplenomegaly, no masses and bowel sounds normal  MS: no gross musculoskeletal defects noted, no edema    Diagnostic Test Results:  Results for orders placed or performed in visit on 05/31/18 (from the past 24 hour(s))   Beta HCG Qual, Urine - [facility] and [place] ([id number])   Result Value Ref Range    Beta HCG Qual IFA Urine Positive (A) NEG^Negative          ASSESSMENT/PLAN:     1. Pregnancy test positive  Will draw serum betas given previous miscarriage.  - Beta HCG Qual, Urine - [facility] and [place] ([id number])  - HCG Quantitative Pregnancy, Blood ([id number])  - OB/GYN REFERRAL  - Prenatal Vit-Fe Fumarate-FA (PRENATAL MULTIVITAMIN PLUS IRON) 27-0.8 MG TABS per tablet; Take 1 tablet by mouth daily  Dispense: 100 tablet; Refill: 3    2. History of miscarriage  - HCG Quantitative Pregnancy, Blood ([id number])  - OB/GYN REFERRAL    Patient Instructions     -Take your prenatal vitamin every day (with 400-800 mcg of folic acid)  -Maintain a healthy weight  -Exercise regularly, eat a well-balanced diet, avoid alcohol and potentially " dangerous chemicals, and drink caffeine only in moderation (<250 mg) daily  -Avoid tobacco products  -Avoid  ibuprofen, motrin, advil, aleve, or naproxen; can take tylenol (intermittently, not in excess) for pain  -Avoid scooping alec litter (if you do, wash hands thoroughly afterwards)  -Avoid raw meats and fish  -Heat lunch meat to steaming  -Eat fish just 2-3 times a week to avoid excess mercury    If you experience any vaginal bleeding and/or cramping, please call us right away.      At [facility], we strive to deliver an exceptional experience to you, every time we see you.  If you receive a survey in the mail, please send us back your thoughts. We really do value your feedback.    Based on your medical history, these are the current health maintenance/preventive care services that you are due for (some may have been done at this visit.)  Health Maintenance Due   Topic Date Due     MIGRAINE ACTION PLAN  05/04/2001       Suggested websites for health information:  Www.Ava.org : Up to date and easily searchable information on multiple topics.  Www.medlineplus.gov : medication info, interactive tutorials, watch real surgeries online  Www.familydoctor.org : good info from the Academy of Family Physicians  Www.cdc.gov : public health info, travel advisories, epidemics (H1N1)  Www.aap.org : children's health info, normal development, vaccinations  Www.health.state.[place].us : [place] dept of health, public health issues in [place], N1N1    Your care team:                            Family Medicine Internal Medicine   MD Tony Stovall MD Shantel Branch-Fleming, MD Katya Georgiev PA-C Megan Hill, JULIANNA Vora MD Pediatrics   GLADYS Cortes, MD Jami Medeiros APRN CNP   MD Tamiko Jordan MD Deborah Mielke, MD Kim Thein, APRN [facility]      Clinic hours: Monday - Thursday 7 am-7 pm; Fridays 7 am-5 pm.   Urgent care: Monday -  Friday 11 am-9 pm; Saturday and Sunday 9 am-5 pm.  Pharmacy : Monday -Thursday 8 am-8 pm; Friday 8 am-6 pm; Saturday and Sunday 9 am-5 pm.     Clinic: [phone]   Pharmacy: [phone]          JULIANNA Viramontes [facility]

## 2018-06-01 ASSESSMENT — ANXIETY QUESTIONNAIRES: GAD7 TOTAL SCORE: 3

## 2018-06-01 ASSESSMENT — PATIENT HEALTH QUESTIONNAIRE - PHQ9: SUM OF ALL RESPONSES TO PHQ QUESTIONS 1-9: 0

## 2018-06-02 ENCOUNTER — HOSPITAL ENCOUNTER (OUTPATIENT)
Dept: LAB | Facility: CLINIC | Age: 33
Discharge: HOME | End: 2018-06-02
Attending: NURSE PRACTITIONER | Admitting: NURSE PRACTITIONER
Payer: COMMERCIAL

## 2018-06-02 DIAGNOSIS — Z87.59 HISTORY OF MISCARRIAGE: ICD-10-CM

## 2018-06-02 DIAGNOSIS — Z32.01 PREGNANCY TEST POSITIVE: ICD-10-CM

## 2018-06-02 LAB — B-HCG SERPL-ACNC: 790 IU/L (ref 0–5)

## 2018-06-02 PROCEDURE — 84702 CHORIONIC GONADOTROPIN TEST: CPT | Performed by: NURSE PRACTITIONER

## 2018-06-02 PROCEDURE — 36415 COLL VENOUS BLD VENIPUNCTURE: CPT | Performed by: NURSE PRACTITIONER

## 2018-06-14 ENCOUNTER — MYC MEDICAL ADVICE (OUTPATIENT)
Dept: OBGYN | Facility: CLINIC | Age: 33
End: 2018-06-14

## 2018-06-27 ENCOUNTER — PRENATAL OFFICE VISIT (OUTPATIENT)
Dept: OBGYN | Facility: CLINIC | Age: 33
End: 2018-06-27
Payer: COMMERCIAL

## 2018-06-27 VITALS
TEMPERATURE: 98.6 F | WEIGHT: 175 LBS | DIASTOLIC BLOOD PRESSURE: 67 MMHG | HEART RATE: 66 BPM | BODY MASS INDEX: 29.35 KG/M2 | SYSTOLIC BLOOD PRESSURE: 107 MMHG

## 2018-06-27 DIAGNOSIS — Z34.80 PRENATAL CARE, SUBSEQUENT PREGNANCY, UNSPECIFIED TRIMESTER: Primary | ICD-10-CM

## 2018-06-27 DIAGNOSIS — Z34.80 SUPERVISION OF OTHER NORMAL PREGNANCY, ANTEPARTUM: ICD-10-CM

## 2018-06-27 PROBLEM — O20.8 SUBCHORIONIC HEMORRHAGE IN FIRST TRIMESTER: Status: RESOLVED | Noted: 2017-12-29 | Resolved: 2018-06-27

## 2018-06-27 LAB
ABO + RH BLD: NORMAL
ABO + RH BLD: NORMAL
BLD GP AB SCN SERPL QL: NORMAL
BLOOD BANK CMNT PATIENT-IMP: NORMAL
ERYTHROCYTE [DISTWIDTH] IN BLOOD BY AUTOMATED COUNT: 14.4 % (ref 10–15)
HCT VFR BLD AUTO: 38.2 % (ref 35–47)
HGB BLD-MCNC: 12.6 G/DL (ref 11.7–15.7)
MCH RBC QN AUTO: 29.2 PG (ref 26.5–33)
MCHC RBC AUTO-ENTMCNC: 33 G/DL (ref 31.5–36.5)
MCV RBC AUTO: 88 FL (ref 78–100)
PLATELET # BLD AUTO: 208 10E9/L (ref 150–450)
RBC # BLD AUTO: 4.32 10E12/L (ref 3.8–5.2)
SPECIMEN EXP DATE BLD: NORMAL
WBC # BLD AUTO: 7.1 10E9/L (ref 4–11)

## 2018-06-27 PROCEDURE — 85027 COMPLETE CBC AUTOMATED: CPT | Performed by: OBSTETRICS & GYNECOLOGY

## 2018-06-27 PROCEDURE — 86762 RUBELLA ANTIBODY: CPT | Performed by: OBSTETRICS & GYNECOLOGY

## 2018-06-27 PROCEDURE — 86850 RBC ANTIBODY SCREEN: CPT | Performed by: OBSTETRICS & GYNECOLOGY

## 2018-06-27 PROCEDURE — 86900 BLOOD TYPING SEROLOGIC ABO: CPT | Performed by: OBSTETRICS & GYNECOLOGY

## 2018-06-27 PROCEDURE — 86780 TREPONEMA PALLIDUM: CPT | Performed by: OBSTETRICS & GYNECOLOGY

## 2018-06-27 PROCEDURE — 87389 HIV-1 AG W/HIV-1&-2 AB AG IA: CPT | Performed by: OBSTETRICS & GYNECOLOGY

## 2018-06-27 PROCEDURE — 87340 HEPATITIS B SURFACE AG IA: CPT | Performed by: OBSTETRICS & GYNECOLOGY

## 2018-06-27 PROCEDURE — 36415 COLL VENOUS BLD VENIPUNCTURE: CPT | Performed by: OBSTETRICS & GYNECOLOGY

## 2018-06-27 PROCEDURE — 86901 BLOOD TYPING SEROLOGIC RH(D): CPT | Performed by: OBSTETRICS & GYNECOLOGY

## 2018-06-27 PROCEDURE — 87086 URINE CULTURE/COLONY COUNT: CPT | Performed by: OBSTETRICS & GYNECOLOGY

## 2018-06-27 PROCEDURE — 99207 ZZC FIRST OB VISIT: CPT | Performed by: OBSTETRICS & GYNECOLOGY

## 2018-06-27 NOTE — PATIENT INSTRUCTIONS
If you have any questions regarding your visit, Please contact your care team.     [place] [facility]: [phone]    [facility] CLINIC HOURS TELEPHONE NUMBER       Fredy Leal M.D.      Ariana-      Eugene Campbell-Medical Assistant       Monday-[place]  8:00a.m-4:45 p.m  Tuesday-[place]  9:00a.m-11:45 a.m  Wednesday-[place] 8:00a.m-4:45 p.m.  Thursday-[place]  8:00a.m-4:45 p.m.  Friday-[place]  8:00a.m-4:45 p.m. [facility]  [address] [place] [address]  [phone] ask for [facility]  [phone] Fax  Imaging [phone]    [facility] Labor and Delivery  [address] [place] [address] [place] [address]  [phone]    [facility]  [address] [place] [address]  [phone] ask [facility]  [phone] Fax  Imaging [phone]     Urgent Care locations:    [facility] Monday-Friday  5 pm - 9 pm  Saturday and Sunday   9 am - 5 pm    Monday-Friday   11 am - 9 pm  Saturday and Sunday   9 am - 5 pm   [phone]       If you need a medication refill, please contact your pharmacy. Please allow 3 business days for your refill to be completed.  As always, Thank you for trusting us with your healthcare needs!

## 2018-06-27 NOTE — MR AVS SNAPSHOT
After Visit Summary   6/27/2018    Ebony Jo    MRN: [id number]           Patient Information     Date Of Birth          1985        Visit Information        Provider Department      6/27/2018 10:00 AM Fredy Leal MD [facility]        Care Instructions                                                        If you have any questions regarding your visit, Please contact your care team.     [place] [facility]: [phone]    [facility] CLINIC HOURS TELEPHONE NUMBER       Fredy Leal M.D.      Ariana-      Eugene Campbell-Medical Assistant       Monday-[place]  8:00a.m-4:45 p.m  Tuesday-[place]  9:00a.m-11:45 a.m  Wednesday-[place] 8:00a.m-4:45 p.m.  Thursday-[place]  8:00a.m-4:45 p.m.  Friday-[place]  8:00a.m-4:45 p.m. [facility]  [address] [place] [address] [place] [address]  [phone] ask for [facility]  [phone] Fax  Imaging [phone]    [facility] Labor and Delivery  [address] [place] [address] [place] [address]  [phone]    [facility]  [address] [place] [address]  [phone] ask for [facility]  [phone] Fax  Imaging [phone]     Urgent Care locations:    [facility] Monday-Friday  5 pm - 9 pm  Saturday and Sunday   9 am - 5 pm    Monday-Friday   11 am - 9 pm  Saturday and Sunday   9 am - 5 pm   [phone]       If you need a medication refill, please contact your pharmacy. Please allow 3 business days for your refill to be completed.  As always, Thank you for trusting us with your healthcare needs!            Follow-ups after your visit        Who to contact     If you have questions or need follow up information about today's clinic visit or your schedule please contact [facility] directly at [phone].  Normal or non-critical lab and  imaging results will be communicated to you by MyChart, letter or phone within 4 business days after the clinic has received the results. If you do not hear from us within 7 days, please contact the clinic through [employer] or phone. If you have a critical or abnormal lab result, we will notify you by phone as soon as possible.  Submit refill requests through [employer] or call your pharmacy and they will forward the refill request to us. Please allow 3 business days for your refill to be completed.          Additional Information About Your Visit        [employer] Information     [employer] gives you secure access to your electronic health record. If you see a primary care provider, you can also send messages to your care team and make appointments. If you have questions, please call your primary care clinic.  If you do not have a primary care provider, please call [phone] and they will assist you.        Care EveryWhere ID     This is your Care EveryWhere ID. This could be used by other organizations to access your [facility] medical records  [id number]        Your Vitals Were     Pulse Temperature Last Period Breastfeeding? BMI (Body Mass Index)       66 98.6  F (37  C) (Oral) 05/04/2018 (Exact Date) No 29.35 kg/m2        Blood Pressure from Last 3 Encounters:   06/27/18 107/67   05/31/18 112/72   01/05/18 107/61    Weight from Last 3 Encounters:   06/27/18 175 lb (79.4 kg)   05/31/18 169 lb (76.7 kg)   01/05/18 170 lb (77.1 kg)              Today, you had the following     No orders found for display       Primary Care Provider Office Phone # Fax #    Desire Elis Waldron [phone]       [address]  [facility] [address]        Equal Access to Services     [facility] Hospital[facility]ENNIFER : Inocencia Vasquez, wamulugeta castillo, mario garcíaalmasood lr, agapito joshi. So [facility] [phone].    ATENCIÓN: Si habla español, tiene a french disposición servicios  nilson de asistencia lingüística. Gabe muse [phone].    We comply with applicable federal civil rights laws and [place] laws. We do not discriminate on the basis of race, color, national origin, age, disability, sex, sexual orientation, or gender identity.            Thank you!     Thank you for choosing [facility]  for your care. Our goal is always to provide you with excellent care. Hearing back from our patients is one way we can continue to improve our services. Please take a few minutes to complete the written survey that you may receive in the mail after your visit with us. Thank you!             Your Updated Medication List - Protect others around you: Learn how to safely use, store and throw away your medicines at www.disposemymeds.org.          This list is accurate as of 6/27/18 10:19 AM.  Always use your most recent med list.                   Brand Name Dispense Instructions for use Diagnosis    prenatal multivitamin plus iron 27-0.8 MG Tabs per tablet     100 tablet    Take 1 tablet by mouth daily    Pregnancy test positive

## 2018-06-27 NOTE — PROGRESS NOTES
Ebony Jo is a 33 year old year old G 4 P 0 who presents for an initial obstetrical visit.  Referred by self.    Currently experiencing normal pregnancy symptoms without particular problems including pain, bleeding, marked vomiting or weight loss except: no exceptions.  This was a planned pregnancy. Menses every 24 days.  Here today alone.   Additional concerns: history of SAB 7 mo ago, AMA, [place] trip in July.     ROS:     Systems reviewed include constitutional; breast;                 cardiac; respiratory; gastrointestinal; genitourinary;                                musculoskeletal; integumentary; psychological;                                hematologic/lymphatic and endocrine.                  These systems were negative for significant symptoms except                 for the following: none and see above HPI.    Past medical, obstetrical, surgical, family and social history reviewed and as noted or updated in chart.     Allergies, meds and supplements are as noted or updated in chart.                    Episode OB dating, demographic and history forms completed or reviewed.    EXAM:  VS as noted. BMI- Body mass index is 29.35 kg/(m^2).    Relatively recent normal general exam- not repeated now.       ASSESSMENT: (Z34.80) Prenatal care, subsequent pregnancy, unspecified trimester  (primary encounter diagnosis)  Comment:   Plan: CBC with Platelets, HIV Antigen Antibody Combo,        Rubella Antibody IgG Quantitative, Hepatitis B         surface antigen, Treponema Abs w Reflex to RPR         and Titer, ABO/RH Type and Screen, Urine         Culture Aerobic Bacterial, US OB < 14 weeks,         single,  for dating ([id number])            (Z34.80) Supervision of other normal pregnancy, antepartum  Comment:   Plan:        PLAN:  Advice appropriate to gestational age reviewed including pertinent Checklist items. Discussed condition, tests and general care plan. Symptoms, problems and concerns reviewed. Recommended weight  gain discussed. Problem list initiated. Check u/s now. Pap due postpartum. Orders as noted. RTC in 4 weeks.   Discussed special diagnostic and screening tests and plans (y = yes, n = no/declined, u = uncertain/considering): quad scr = u, survey u/s = n, Level 2 survey u/s with MFM = y, CF carrier scr = n, hemoglobinopathy or thalassemia scr= n, SMA, fragile X  and other genetic scr= n, 1st trimester scr with NT and later AFP or with cell free DNA and later AFP = u, cell free DNA= u, genetic amnio/CVS = u.    Fredy Leal MD

## 2018-06-28 LAB
BACTERIA SPEC CULT: NO GROWTH
HBV SURFACE AG SERPL QL IA: NONREACTIVE
HIV 1+2 AB+HIV1 P24 AG SERPL QL IA: NONREACTIVE
SPECIMEN SOURCE: NORMAL
T PALLIDUM AB SER QL: NONREACTIVE

## 2018-06-29 LAB — RUBV IGG SERPL IA-ACNC: 157 IU/ML

## 2018-07-02 ENCOUNTER — RADIANT APPOINTMENT (OUTPATIENT)
Dept: ULTRASOUND IMAGING | Facility: CLINIC | Age: 33
End: 2018-07-02
Attending: OBSTETRICS & GYNECOLOGY
Payer: COMMERCIAL

## 2018-07-02 DIAGNOSIS — Z34.80 PRENATAL CARE, SUBSEQUENT PREGNANCY, UNSPECIFIED TRIMESTER: ICD-10-CM

## 2018-07-02 PROCEDURE — 76801 OB US < 14 WKS SINGLE FETUS: CPT

## 2018-07-18 ENCOUNTER — PRENATAL OFFICE VISIT (OUTPATIENT)
Dept: OBGYN | Facility: CLINIC | Age: 33
End: 2018-07-18
Payer: COMMERCIAL

## 2018-07-18 VITALS
DIASTOLIC BLOOD PRESSURE: 58 MMHG | WEIGHT: 176 LBS | BODY MASS INDEX: 29.51 KG/M2 | SYSTOLIC BLOOD PRESSURE: 104 MMHG | HEART RATE: 64 BPM | TEMPERATURE: 98.4 F

## 2018-07-18 DIAGNOSIS — O09.529 ANTEPARTUM MULTIGRAVIDA OF ADVANCED MATERNAL AGE: ICD-10-CM

## 2018-07-18 DIAGNOSIS — Z34.80 SUPERVISION OF OTHER NORMAL PREGNANCY, ANTEPARTUM: ICD-10-CM

## 2018-07-18 PROCEDURE — 99207 ZZC PRENATAL VISIT: CPT | Performed by: OBSTETRICS & GYNECOLOGY

## 2018-07-18 NOTE — Clinical Note
Please arrange first trimester screen (this includes ultrasound, labs, and genetic counseling) with MFM at MG site if possible. This is for AMA and suspected vanishing twin in first trimester. I will sign the paper referral order later as needed.

## 2018-07-18 NOTE — PATIENT INSTRUCTIONS
If you have any questions regarding your visit, Please contact your care team.     [place] [facility]: [phone]    Women s Health CLINIC HOURS TELEPHONE NUMBER       CHAY Costa-      Eugene Campbell-Medical Assistant       Monday-[place]  8:00a.m-4:45 p.m  Tuesday-[place]  9:00a.m-11:45 a.m  Wednesday-[place] 8:00a.m-4:45 p.m.  Thursday-[place]  8:00a.m-4:45 p.m.  Friday-[place]  8:00a.m-4:45 p.m. [facility]  [address] [place] [address]  [phone] ask [facility]  [phone] Fax  Imaging [phone]    [facility] Labor and Delivery  [address] [place] [address] [place] [address]  [phone]    [facility]  [address] [place] [address]  [phone] [facility]  [phone] Fax  Imaging [phone]     Urgent Care locations:    [place]        [place] Monday-Friday  5 pm - 9 pm  Saturday and Sunday   9 am - 5 pm    Monday-Friday   11 am - 9 pm  Saturday and Sunday   9 am - 5 pm   [phone]       If you need a medication refill, please contact your pharmacy. Please allow 3 business days for your refill to be completed.  As always, Thank you for trusting us with your healthcare needs!

## 2018-07-19 PROBLEM — O09.529 ANTEPARTUM MULTIGRAVIDA OF ADVANCED MATERNAL AGE: Status: ACTIVE | Noted: 2018-07-19

## 2018-07-20 ENCOUNTER — MEDICAL CORRESPONDENCE (OUTPATIENT)
Dept: HEALTH INFORMATION MANAGEMENT | Facility: CLINIC | Age: 33
End: 2018-07-20

## 2018-07-20 ENCOUNTER — TELEPHONE (OUTPATIENT)
Dept: OBGYN | Facility: CLINIC | Age: 33
End: 2018-07-20

## 2018-07-20 NOTE — PROGRESS NOTES
No signif signs, symptoms or concerns except min spotting with wiping and some N/V. Reviewed ultrasound suggesting vanishing twin to now single living pregnancy. Desires to proceed with 1st trim screening and will arrange this with MFM. Trip precautions reviewed. Advice appropriate to gestational age reviewed including pertinent Checklist items. Discussed condition, tests and care plan including RBA. Problem list updated.   A/P:  Ebony was seen today for prenatal care.    Diagnoses and all orders for this visit:    Supervision of other normal pregnancy, antepartum        Fredy Leal MD

## 2018-07-20 NOTE — TELEPHONE ENCOUNTER
Fredy Lela MD  P [facility]                   Please arrange first trimester screen (this includes ultrasound, labs, and genetic counseling) with MFM at  site if possible. This is for AMA and suspected vanishing twin in first trimester. I will sign the paper referral order later as needed.       MFM referral form completed and faxed along with prenatal records.  Copy sent to scanning.  Radha Clemente CMA  July 20, 2018 3:17 PM

## 2018-07-27 ENCOUNTER — MYC MEDICAL ADVICE (OUTPATIENT)
Dept: OBGYN | Facility: CLINIC | Age: 33
End: 2018-07-27

## 2018-07-27 NOTE — TELEPHONE ENCOUNTER
Left message asking pt to call back to clinic about her MyChart msg. Left clinic call back phone number. Toyin Baez RN, BAN

## 2018-07-27 NOTE — TELEPHONE ENCOUNTER
Return call to patient. She declined an appt today with JULIANNA Pizano CNP as she is in [place] now. She requested an appt on 08-01-18 as she works on 07-29-18 & 07-30-18. There was a lot of noise in the background as [place] at the airport. It was very difficult to hear patient. Suggested she call back later to schedule an appt. Patient agreed. Toyin Baez RN, BAN

## 2018-08-01 ENCOUNTER — PRENATAL OFFICE VISIT (OUTPATIENT)
Dept: OBGYN | Facility: CLINIC | Age: 33
End: 2018-08-01
Payer: COMMERCIAL

## 2018-08-01 VITALS
BODY MASS INDEX: 29.75 KG/M2 | WEIGHT: 177.38 LBS | HEART RATE: 68 BPM | SYSTOLIC BLOOD PRESSURE: 103 MMHG | DIASTOLIC BLOOD PRESSURE: 69 MMHG

## 2018-08-01 DIAGNOSIS — Z34.80 SUPERVISION OF OTHER NORMAL PREGNANCY, ANTEPARTUM: ICD-10-CM

## 2018-08-01 PROCEDURE — 99207 ZZC PRENATAL VISIT: CPT | Performed by: ADVANCED PRACTICE MIDWIFE

## 2018-08-01 NOTE — NURSING NOTE
"Chief Complaint   Patient presents with     Prenatal Care     intermittent abdominal pain       Initial /69 (BP Location: Right arm, Patient Position: Sitting, Cuff Size: Adult Regular)  Pulse 68  Wt 177 lb 6 oz (80.5 kg)  LMP 05/04/2018 (Exact Date)  BMI 29.75 kg/m2 Estimated body mass index is 29.75 kg/(m^2) as calculated from the following:    Height as of 5/31/18: 5' 4.75\" (1.645 m).    Weight as of this encounter: 177 lb 6 oz (80.5 kg).  Medication Reconciliation: complete    Anita Carlos CMA    "

## 2018-08-01 NOTE — PROGRESS NOTES
Complaining of upper abdominal pain. Was in [place] last week and noted pain after eating a large meal.  Located under her right rib extends from mid line to beneath right axilla. Nagging pain  Has improved since she first noted it.  Now rates it as a 4-5/10 rather than a 6.  Goes away when she lays down or walks.  Nausea has improved since early pregnancy but now vomits when she brushes her teeth other wise no change in bowel or nausea due to the pain.  Starts when she eats food and admits that she eats primarily spicy foods   Not classic for cholelithiasis.  Will try smaller meals bland food and TUMS or zantac for now.   If continues would consider imaging.  Will call if pain worsens.  Discussed normal vaginal discharge in pregnancy.   Has declined first and second trimester genetic testing.   RTC 2 weeks for regularly scheduled appointment.  JULIANNA Ojeda, NICO\

## 2018-08-01 NOTE — MR AVS SNAPSHOT
After Visit Summary   8/1/2018    Ebony Jo    MRN: [id number]           Patient Information     Date Of Birth          1985        Visit Information        Provider Department      8/1/2018 8:00 AM Billie Crocker APRN CNM [facility]        Today's Diagnoses     Supervision of other normal pregnancy, antepartum           Follow-ups after your visit        Follow-up notes from your care team     Return in about 2 weeks (around 8/15/2018), or if symptoms worsen or fail to improve.      Your next 10 appointments already scheduled     Aug 17, 2018  9:00 AM CDT   ESTABL[facility]ENAT[facility] with [facility]al [facility]    [address] [place] [address] [phone]              Who to contact     If you have questions or need follow up information about today's clinic visit or your schedule please contact [facility] directly at [phone].  Normal or non-critical lab and imaging results will be communicated to you by Eldarionhart, letter or phone within 4 business days after the clinic has received the results. If you do not hear from us within 7 days, please contact the clinic through BlockTrailt or phone. If you have a critical or abnormal lab result, we will notify you by phone as soon as possible.  Submit refill requests through [employer] or call your pharmacy and they will forward the refill request to us. Please allow 3 business days for your refill to be completed.          Additional Information About Your Visit        MyChart Information     [employer] gives you secure access to your electronic health record. If you see a primary care provider, you can also send messages to your care team and make appointments. If you have questions, please call your primary care clinic.  If you do not have a primary care provider, please call [phone] and they will assist you.        Care EveryWhere ID      This is your Care EveryWhere ID. This could be used by other organizations to access your [facility] medical records  [id number]        Your Vitals Were     Pulse Last Period BMI (Body Mass Index)             68 05/04/2018 (Exact Date) 29.75 kg/m2          Blood Pressure from Last 3 Encounters:   08/01/18 103/69   07/18/18 104/58   06/27/18 107/67    Weight from Last 3 Encounters:   08/01/18 177 lb 6 oz (80.5 kg)   07/18/18 176 lb (79.8 kg)   06/27/18 175 lb (79.4 kg)              Today, you had the following     No orders found for display       Primary Care Provider Office Phone # Fax #    Desire Elis Waldron [phone]       [address] [place]  [facility] [address]        Equal Access to Services     [facility]: Hadii aad ku hadasho Soomaali, waaxda luqadaha, qaybta kaalmada adeegyada, waxay idiin hayaawilliam razo . So [facility] [phone].    ATENCIÓN: Si habla español, tiene a french disposición servicios gratuitos de asistencia lingüística. Llame al [phone].    We comply with applicable federal civil rights laws and [place] laws. We do not discriminate on the basis of race, color, national origin, age, disability, sex, sexual orientation, or gender identity.            Thank you!     Thank you for choosing [facility]  [facility]. Our goal is always to provide you with excellent care. Hearing back from our patients is one way we can continue to improve our services. Please take a few minutes to complete the written survey that you may receive in the mail after your visit with us. Thank you!             Your Updated Medication List - Protect others around you: Learn how to safely use, store and throw away your medicines at www.disposemymeds.org.          This list is accurate as of 8/1/18  9:02 AM.  Always use your most recent med list.                   Brand Name Dispense Instructions for use Diagnosis    prenatal multivitamin plus iron  27-0.8 MG Tabs per tablet     100 tablet    Take 1 tablet by mouth daily    Pregnancy test positive

## 2018-08-17 ENCOUNTER — PRENATAL OFFICE VISIT (OUTPATIENT)
Dept: OBGYN | Facility: CLINIC | Age: 33
End: 2018-08-17
Payer: COMMERCIAL

## 2018-08-17 VITALS
TEMPERATURE: 98.2 F | BODY MASS INDEX: 29.85 KG/M2 | WEIGHT: 178 LBS | SYSTOLIC BLOOD PRESSURE: 103 MMHG | DIASTOLIC BLOOD PRESSURE: 64 MMHG | HEART RATE: 71 BPM

## 2018-08-17 DIAGNOSIS — Z34.80 SUPERVISION OF OTHER NORMAL PREGNANCY, ANTEPARTUM: ICD-10-CM

## 2018-08-17 PROCEDURE — 99207 ZZC PRENATAL VISIT: CPT | Performed by: OBSTETRICS & GYNECOLOGY

## 2018-08-17 NOTE — MR AVS SNAPSHOT
After Visit Summary   8/17/2018    Ebony Jo    MRN: [id number]           Patient Information     Date Of Birth          1985        Visit Information        Provider Department      8/17/2018 9:00 AM Fredy Leal MD [facility]        Today's Diagnoses     Supervision of other normal pregnancy, antepartum          Care Instructions                                                        If you have any questions regarding your visit, Please contact your care team.     [facility] Access Services: [phone]    [facility] CLINIC HOURS TELEPHONE NUMBER       Fredy Leal M.D.      Ariana-      Eugene Campbell-Medical Assistant       Monday-[place]  8:00a.m-4:45 p.m  Tuesday[facility]  9:00a.m-11:45 a.m  Wednesday[facility] 8:00a.m-4:45 p.m.  Thursday[facility]  8:00a.m-4:45 p.m.  Friday[facility]  8:00a.m-4:45 p.m. [facility]  [address]  [place], [place] [address] [phone] ask [facility]  [phone] Fax  Imaging [phone]    [facility] Labor and Delivery  [address] [place] [address] [place] [address]  [phone]    [facility]  [address] [place], [place] [address] [phone] [facility]  [phone] Fax  Imaging [phone]     Urgent Care locations:    [facility] Monday-Friday  5 pm - 9 pm  Saturday and Sunday   9 am - 5 pm    Monday-Friday   11 am - 9 pm  Saturday and Sunday   9 am - 5 pm   [phone]       If you need a medication refill, please contact your pharmacy. Please allow 3 business days for your refill to be completed.  As always, Thank you for trusting us with your healthcare needs!            Follow-ups after your visit        Your next 10 appointments already scheduled     Aug 17, 2018  9:00 AM CDT   ESTABLISHED PRENATAL with Fredy Leal MD   [facility]  [place] ([facility])    [address] [facility] [address] [address]   [phone]            Sep 13, 2018  9:45 AM CDT   ESTABLISHED PRENATAL with Fredy Leal MD   [facility] ([facility])    [address] [address]   [phone]              Who to contact     If you have questions or need follow up information about today's clinic visit or your schedule please contact [facility] directly at [phone].  Normal or non-critical lab and imaging results will be communicated to you by OriginOilhart, letter or phone within 4 business days after the clinic has received the results. If you do not hear from us within 7 days, please contact the clinic through [employer]star[employer] or phone. If you have a critical or abnormal lab result, we will notify you by phone as soon as possible.  Submit refill requests through Traxer or call your pharmacy and they will forward the refill request to us. Please allow 3 business days for your refill to be completed.          Additional Information About Your Visit        OriginOilhart Information     [employer] gives you secure access to your electronic health record. If you see a primary care provider, you can also send messages to your care team and make appointments. If you have questions, please call your primary care clinic.  If you do not have a primary care provider, please call [phone] and they will assist you.        Care EveryWhere ID     This is your Care EveryWhere ID. This could be used by other organizations to access your [facility] medical records  [id number]        Your Vitals Were     Pulse Temperature Last Period Breastfeeding? BMI (Body Mass Index)       71 98.2  F (36.8  C) (Oral) 05/04/2018 (Exact Date) No 29.85 kg/m2        Blood Pressure from Last 3 Encounters:   08/17/18 103/64   08/01/18 103/69   07/18/18 104/58    Weight from Last 3 Encounters:   08/17/18 178  lb (80.7 kg)   08/01/18 177 lb 6 oz (80.5 kg)   07/18/18 176 lb (79.8 kg)              Today, you had the following     No orders found for display       Primary Care Provider Office Phone # Fax #    Desire Gutierrezharvey Waldron -[phone]       [address]  [facility] [address]        Equal Access to Services     JULIOCESAR BARTLETT : Hadii aad ku hadasho Soomaali, waaxda luqadaha, qaybta kaalmada adeegyada, waxay idiin hayaan adeeg kharash la'mahendra . So [facility] [place] [phone].    ATENCIÓN: Si habla español, tiene a french disposición servicios gratuitos de asistencia lingüística. Llame al [phone].    We comply with applicable federal civil rights laws and [place] laws. We do not discriminate on the basis of race, color, national origin, age, disability, sex, sexual orientation, or gender identity.            Thank you!     Thank you for choosing [facility]  for your care. Our goal is always to provide you with excellent care. Hearing back from our patients is one way we can continue to improve our services. Please take a few minutes to complete the written survey that you may receive in the mail after your visit with us. Thank you!             Your Updated Medication List - Protect others around you: Learn how to safely use, store and throw away your medicines at www.disposemymeds.org.          This list is accurate as of 8/17/18  8:54 AM.  Always use your most recent med list.                   Brand Name Dispense Instructions for use Diagnosis    prenatal multivitamin plus iron 27-0.8 MG Tabs per tablet     100 tablet    Take 1 tablet by mouth daily    Pregnancy test positive

## 2018-08-17 NOTE — Clinical Note
Please arrange Level 2 ultrasound with MFM at [facility] site if possible. This is for AMA. I will sign the paper referral order later as needed.

## 2018-08-17 NOTE — PATIENT INSTRUCTIONS
If you have any questions regarding your visit, Please contact your care team.     [facility] [facility] [facility] [employer]: [phone]    [facility] CLINIC HOURS TELEPHONE NUMBER       CHAY Costa-      Eugene Campbell-Medical Assistant       Monday-[place]  8:00a.m-4:45 p.m  Tuesday-[place]  9:00a.m-11:45 a.m  Wednesday-[place] 8:00a.m-4:45 p.m.  Thursday-[place]  8:00a.m-4:45 p.m.  Friday-[place]  8:00a.m-4:45 p.m. [facility]  [address] [place] [address]  [phone] ask [facility]  [phone] Fax  Imaging [phone]    [facility] Labor and Delivery  [address] [place] [address] [place] [address]  [phone]    [facility]  [address] [place] [address]  [phone] [facility]  [phone] Fax  Imaging [phone]     Urgent Care locations:    [place]        [place] Monday-Friday  5 pm - 9 pm  Saturday and Sunday   9 am - 5 pm    Monday-Friday   11 am - 9 pm  Saturday and Sunday   9 am - 5 pm   [phone]       If you need a medication refill, please contact your pharmacy. Please allow 3 business days for your refill to be completed.  As always, Thank you for trusting us with your healthcare needs!

## 2018-08-18 NOTE — PROGRESS NOTES
No signif signs, symptoms or concerns except some intermittent RUQ pain but decreased. Decided not to do first trim screening. Discussed work modifications for later in pregnancy. Advice appropriate to gestational age reviewed including pertinent Checklist items. Discussed condition, tests and care plan including RBA. Problem list updated. Level 2 ultrasound next.  A/P:  Ebony was seen today for prenatal care.    Diagnoses and all orders for this visit:    Supervision of other normal pregnancy, antepartum        Fredy Leal MD

## 2018-08-20 ENCOUNTER — TELEPHONE (OUTPATIENT)
Dept: OBGYN | Facility: CLINIC | Age: 33
End: 2018-08-20

## 2018-08-20 NOTE — TELEPHONE ENCOUNTER
Referral signed & faxed to MFM along with patient's prenatal chart.  MFM will contact patient within 3 business days to schedule.

## 2018-08-30 ENCOUNTER — MYC MEDICAL ADVICE (OUTPATIENT)
Dept: OBGYN | Facility: CLINIC | Age: 33
End: 2018-08-30

## 2018-08-30 NOTE — TELEPHONE ENCOUNTER
See 8/20/18 MyChart encounter.  Spoke with [facility]- they have contacted patient twice & left messages.  MFM will call patient again today.  Patient notified via Vuduhart.

## 2018-09-04 ENCOUNTER — OFFICE VISIT (OUTPATIENT)
Dept: DERMATOLOGY | Facility: CLINIC | Age: 33
End: 2018-09-04
Payer: COMMERCIAL

## 2018-09-04 ENCOUNTER — MYC MEDICAL ADVICE (OUTPATIENT)
Dept: OBGYN | Facility: CLINIC | Age: 33
End: 2018-09-04

## 2018-09-04 DIAGNOSIS — L70.0 ACNE VULGARIS: Primary | ICD-10-CM

## 2018-09-04 DIAGNOSIS — L85.8 KP (KERATOSIS PILARIS): ICD-10-CM

## 2018-09-04 DIAGNOSIS — L81.0 POST-INFLAMMATORY HYPERPIGMENTATION: ICD-10-CM

## 2018-09-04 PROCEDURE — 99202 OFFICE O/P NEW SF 15 MIN: CPT | Performed by: DERMATOLOGY

## 2018-09-04 RX ORDER — CLINDAMYCIN PHOSPHATE 10 UG/ML
LOTION TOPICAL
Qty: 60 ML | Refills: 11 | Status: SHIPPED | OUTPATIENT
Start: 2018-09-04 | End: 2020-10-22

## 2018-09-04 ASSESSMENT — PAIN SCALES - GENERAL: PAINLEVEL: NO PAIN (0)

## 2018-09-04 NOTE — MR AVS SNAPSHOT
After Visit Summary   9/4/2018    Ebony Jo    MRN: [id number]           Patient Information     Date Of Birth          1985        Visit Information        Provider Department      9/4/2018 12:30 PM Juju Blackmon MD [facility]        Today's Diagnoses     Acne vulgaris    -  1      Care Instructions    Azelaic acid - 15%. This is an over-the-counter topical you can get on Amazon. Apply once or twice daily.     Ask your OB/GYN physician about benzoyl peroxide body wash and if this is safe during pregnancy.           Follow-ups after your visit        Your next 10 appointments already scheduled     Sep 13, 2018  9:45 AM CDT   ESTABLISHED PRENATAL with Fredy Leal MD   [facility] ([facility])    [address] [place] [address] [phone]              Who to contact     If you have questions or need follow up information about today's clinic visit or your schedule please contact [facility] directly at [phone].  Normal or non-critical lab and imaging results will be communicated to you by MyChart, letter or phone within 4 business days after the clinic has received the results. If you do not hear from us within 7 days, please contact the clinic through iDoneThishart or phone. If you have a critical or abnormal lab result, we will notify you by phone as soon as possible.  Submit refill requests through Ezetap or call your pharmacy and they will forward the refill request to us. Please allow 3 business days for your refill to be completed.          Additional Information About Your Visit        MyChart Information     E[employer]ap gives you secure access to your electronic health record. If you see a primary care provider, you can also send messages to your care team and make appointments. If you have questions, please call your primary care clinic.  If you do not have a primary care provider,  please call [phone] and they will assist you.      L2C is an electronic gateway that provides easy, online access to your medical records. With L2C, you can request a clinic appointment, read your test results, renew a prescription or communicate with your care team.     To access your existing account, please contact your [facility] or call [phone] for assistance.        Care EveryWhere ID     This is your Care EveryWhere ID. This could be used by other organizations to access your [facility] medical records  [id number]        Your Vitals Were     Last Period                   05/04/2018 (Exact Date)            Blood Pressure from Last 3 Encounters:   08/17/18 103/64   08/01/18 103/69   07/18/18 104/58    Weight from Last 3 Encounters:   08/17/18 80.7 kg (178 lb)   08/01/18 80.5 kg (177 lb 6 oz)   07/18/18 79.8 kg (176 lb)              Today, you had the following     No orders found for display         Today's Medication Changes          These changes are accurate as of 9/4/18  1:03 PM.  If you have any questions, ask your nurse or doctor.               Start taking these medicines.        Dose/Directions    clindamycin 1 % lotion   Commonly known as:  CLEOCIN T   Used for:  Acne vulgaris   Started by:  Juju Blackmon MD        Apply thin layer to affected areas once to twice daily.   Quantity:  60 mL   Refills:  11            Where to get your medicines      These medications were sent to [facility]/pharmacy #[id number] - [place], [address], [facility] [address]     Phone:  [phone]     clindamycin 1 % lotion                Primary Care Provider Office Phone # Fax #    Desire Elis Waldron [phone]       [address]  [place] [facility] [address]        Equal Access to Services     BRIDGET BARTLETT AH: Inocencia Vasquez, jeffry castillo, mario lr,  agapito devinenae coker'aan ah. So [facility] [phone].    ATENCIÓN: Si habla anisa, tiene a french disposición servicios gratuitos de asistencia lingüística. Gabe al [phone].    We comply with applicable federal civil rights laws and [place] laws. We do not discriminate on the basis of race, color, national origin, age, disability, sex, sexual orientation, or gender identity.            Thank you!     Thank you for choosing [facility]  for your care. Our goal is always to provide you with excellent care. Hearing back from our patients is one way we can continue to improve our services. Please take a few minutes to complete the written survey that you may receive in the mail after your visit with us. Thank you!             Your Updated Medication List - Protect others around you: Learn how to safely use, store and throw away your medicines at www.disposemymeds.org.          This list is accurate as of 9/4/18  1:03 PM.  Always use your most recent med list.                   Brand Name Dispense Instructions for use Diagnosis    clindamycin 1 % lotion    CLEOCIN T    60 mL    Apply thin layer to affected areas once to twice daily.    Acne vulgaris       prenatal multivitamin plus iron 27-0.8 MG Tabs per tablet     100 tablet    Take 1 tablet by mouth daily    Pregnancy test positive

## 2018-09-04 NOTE — PATIENT INSTRUCTIONS
Azelaic acid - 15%. This is an over-the-counter topical you can get on Amazon. Apply once or twice daily.     Ask your OB/GYN physician about benzoyl peroxide body wash and if this is safe during pregnancy.

## 2018-09-04 NOTE — NURSING NOTE
Ebony Jo's goals for this visit include:   Chief Complaint   Patient presents with     Acne     Acne on lower back- came when got pregnant. States spots end up as black spots       She requests these members of her care team be copied on today's visit information:     PCP: Desire Dumont    Referring Provider:  No referring provider defined for this encounter.    LMP 05/04/2018 (Exact Date)    Do you need any medication refills at today's visit? No.    Jannette Pagan LPN

## 2018-09-04 NOTE — PROGRESS NOTES
"[facility] Dermatology Note      Dermatology Problem List:  1. Acne vulgaris with PIH and KP  - Currently pregnant, limiting treatment options  - Current tx: OTC azelaic acid, clindamycin 1% lotion.     Encounter Date: Sep 4, 2018    CC:  Chief Complaint   Patient presents with     Acne     Acne on lower back- came when got pregnant. States spots end up as black spots       History of Present Illness:  Ms. Ebony Jo is a 33 year old female who presents for evaluation of acne and concerning black lesions. In regards to her acne, the patient reports that she gets pimples on her arms which she picks at. Denies any pruritus around the lesions.hile these typically localized to her arms, she noticed that once she became pregnant, she broke out on her back. She has tried the OTC topical \"Movate\" which she purchased in [place] to treat her acne in the past, but she noticed that it lightened her skin - denies it contains any hydroquinone. Additionally, she uses an Olay body wash.  Patient is currently pregnant - 18 weeks. She has been dealing with morning sickness, but otherwise the pregnancy is going well.     No additional skin concerns to be addressed at this visit.     Past Medical History:   Patient Active Problem List   Diagnosis     CARDIOVASCULAR SCREENING; LDL GOAL LESS THAN 160     Migraine without aura     Hemifacial spasm, right     Supervision of other normal pregnancy, antepartum     Antepartum multigravida of advanced maternal age     Acne vulgaris     Past Medical History:   Diagnosis Date     Hemifacial spasm, right 09/26/2013    stress-induced     Malaria 2009     Migraine without aura 9/26/2013     Seasonal allergies      Past Surgical History:   Procedure Laterality Date     APPENDECTOMY  1997       Social History:  Social History     Social History     Marital status:      Spouse name: Arti     Number of children: 0     Years of education: N/A     Occupational History     " nursing assistant/transport aide [employer]     Social History Main Topics     Smoking status: Never Smoker     Smokeless tobacco: Never Used     Alcohol use No      Comment:  0-2  wine  glasses monthly, not in [place]     Drug use: No     Sexual activity: Yes     Partners: Male     Birth control/ protection: None     Other Topics Concern      Service No     Blood Transfusions No     Caffeine Concern No     Occupational Exposure No     Hobby Hazards No     Sleep Concern Yes     Stress Concern No     Weight Concern Yes     Special Diet No     Back Care No     Exercise No     Bike Helmet No     Seat Belt Yes     Self-Exams No     Social History Narrative    From [facility] to [facility] in 2010.   Patient currently pregnant with first child.    Family History:  Family History   Problem Relation Age of Onset     Diabetes Mother      Alzheimer Disease Father      Diabetes Brother        Medications:  Current Outpatient Prescriptions   Medication Sig Dispense Refill     clindamycin (CLEOCIN T) 1 % lotion Apply thin layer to affected areas once to twice daily. 60 mL 11     Prenatal Vit-Fe Fumarate-FA (PRENATAL MULTIVITAMIN PLUS IRON) 27-0.8 MG TABS per tablet Take 1 tablet by mouth daily 100 tablet 3       No Known Allergies    Review of Systems:  -Constitutional: The patient is feeling generally well with the exception of morning sickness.  -Skin: As above in HPI. No additional skin concerns.    Physical exam:  Vitals: LMP 05/04/2018 (Exact Date)  GEN: This is a well developed, well-nourished female in no acute distress, in a pleasant mood.    SKIN: Acne exam, which includes the face, neck, upper central chest, and upper central back was performed.  - Barboza type V skin  - On the central upper and lower back, there are excoriated hyperpigmented papules clustered together in areas that patient can reach.  There are no primary lesions.  - Similar clusters of excoriated hyperpigmented papules on the posterior upper arms  as well.   - Face is clear of any acne, including comedones or inflammatory papules..  - No other lesions of concern on areas examined.       Impression/Plan:  1. Acne vulgaris with postinflammatory hyperpigmentation exacerbated by picking at lesions. Possibly a component of keratosis pilaris given the location on the posterior upper arms.  Discussed with patient the importance of stopping picking as she has no primary lesions today.    Patient's Movate topical contains clobetasol as an active ingredient - advised her that this can cause acne and she should stop using it immediately.     Treatment safe during pregnancy were discussed, including azelaic acid and clindamycin lotion. Given that the patient is pregnant, we are somewhat limited in treatment options.    Start OTC azelaic acid as this is not often covered by insurance.  Apply once daily in the PM.    Start clindamycin 1% lotion. Apply to the affected areas once daily in the AM.     Discuss with OB/GYN about safety of BPO wash during pregnancy.     Could consider amoxicillin if flares during pregnancy.    Follow-up prn for new or changing lesions.    Staff Involved:  Scribe/Staff    Scribe Disclosure  I, Chan Muñiz, am serving as a scribe to document services personally performed by Dr. Juju Blackmon MD, based on data collection and the provider's statements to me.     Provider Disclosure:   The documentation recorded by the scribe accurately reflects the services I personally performed and the decisions made by me.    Juju Blackmon MD    Department of Dermatology  [facility]: Phone: [phone], Fax:[phone]  [facility]: Phone: [phone], Fax: [phone]

## 2018-09-04 NOTE — LETTER
"    9/4/2018         RE: Ebony Jo  [address]  [place] [address]        Dear Colleague,    Thank you for referring your patient, Ebony Jo, to the [facility]. Please see a copy of my visit note below.    [facility] Dermatology Note      Dermatology Problem List:  1. Acne vulgaris with PIH and KP  - Currently pregnant, limiting treatment options  - Current tx: OTC azelaic acid, clindamycin 1% lotion.     Encounter Date: Sep 4, 2018    CC:  Chief Complaint   Patient presents with     Acne     Acne on lower back- came when got pregnant. States spots end up as black spots       History of Present Illness:  Ms. Ebony Jo is a 33 year old female who presents for evaluation of acne and concerning black lesions. In regards to her acne, the patient reports that she gets pimples on her arms which she picks at. Denies any pruritus around the lesions.hile these typically localized to her arms, she noticed that once she became pregnant, she broke out on her back. She has tried the OTC topical \"Movate\" which she purchased in [place] to treat her acne in the past, but she noticed that it lightened her skin - denies it contains any hydroquinone. Additionally, she uses an Olay body wash.  Patient is currently pregnant - 18 weeks. She has been dealing with morning sickness, but otherwise the pregnancy is going well.     No additional skin concerns to be addressed at this visit.     Past Medical History:   Patient Active Problem List   Diagnosis     CARDIOVASCULAR SCREENING; LDL GOAL LESS THAN 160     Migraine without aura     Hemifacial spasm, right     Supervision of other normal pregnancy, antepartum     Antepartum multigravida of advanced maternal age     Acne vulgaris     Past Medical History:   Diagnosis Date     Hemifacial spasm, right 09/26/2013    stress-induced     Malaria 2009     Migraine without aura 9/26/2013     Seasonal allergies      Past Surgical " History:   Procedure Laterality Date     APPENDECTOMY  1997       Social History:  Social History     Social History     Marital status:      Spouse name: Arti     Number of children: 0     Years of education: N/A     Occupational History     nursing assistant/transport aide [employer]     Social History Main Topics     Smoking status: Never Smoker     Smokeless tobacco: Never Used     Alcohol use No      Comment:  0-2  wine  glasses monthly, not in [place]     Drug use: No     Sexual activity: Yes     Partners: Male     Birth control/ protection: None     Other Topics Concern      Service No     Blood Transfusions No     Caffeine Concern No     Occupational Exposure No     Hobby Hazards No     Sleep Concern Yes     Stress Concern No     Weight Concern Yes     Special Diet No     Back Care No     Exercise No     Bike Helmet No     Seat Belt Yes     Self-Exams No     Social History Narrative    From [facility] to [facility] in 2010.   Patient currently pregnant with first child.    Family History:  Family History   Problem Relation Age of Onset     Diabetes Mother      Alzheimer Disease Father      Diabetes Brother        Medications:  Current Outpatient Prescriptions   Medication Sig Dispense Refill     clindamycin (CLEOCIN T) 1 % lotion Apply thin layer to affected areas once to twice daily. 60 mL 11     Prenatal Vit-Fe Fumarate-FA (PRENATAL MULTIVITAMIN PLUS IRON) 27-0.8 MG TABS per tablet Take 1 tablet by mouth daily 100 tablet 3       No Known Allergies    Review of Systems:  -Constitutional: The patient is feeling generally well with the exception of morning sickness.  -Skin: As above in HPI. No additional skin concerns.    Physical exam:  Vitals: LMP 05/04/2018 (Exact Date)  GEN: This is a well developed, well-nourished female in no acute distress, in a pleasant mood.    SKIN: Acne exam, which includes the face, neck, upper central chest, and upper central back was performed.  - Barboza type V skin  -  On the central upper and lower back, there are excoriated hyperpigmented papules clustered together in areas that patient can reach.  There are no primary lesions.  - Similar clusters of excoriated hyperpigmented papules on the posterior upper arms as well.   - Face is clear of any acne, including comedones or inflammatory papules..  - No other lesions of concern on areas examined.       Impression/Plan:  1. Acne vulgaris with postinflammatory hyperpigmentation exacerbated by picking at lesions. Possibly a component of keratosis pilaris given the location on the posterior upper arms.  Discussed with patient the importance of stopping picking as she has no primary lesions today.    Patient's Movate topical contains clobetasol as an active ingredient - advised her that this can cause acne and she should stop using it immediately.     Treatment safe during pregnancy were discussed, including azelaic acid and clindamycin lotion. Given that the patient is pregnant, we are somewhat limited in treatment options.    Start OTC azelaic acid as this is not often covered by insurance.  Apply once daily in the PM.    Start clindamycin 1% lotion. Apply to the affected areas once daily in the AM.     Discuss with OB/GYN about safety of BPO wash during pregnancy.     Could consider amoxicillin if flares during pregnancy.    Follow-up prn for new or changing lesions.    Staff Involved:  Scribe/Staff    Scribe Disclosure  I, Chan Muñiz, am serving as a scribe to document services personally performed by Dr. Jjuu Blackmon MD, based on data collection and the provider's statements to me.     Provider Disclosure:   The documentation recorded by the scribe accurately reflects the services I personally performed and the decisions made by me.    Juju Blackmon MD    Department of Dermatology  [facility]: Phone: [phone], Fax:[phone]  [facility]  [facility] [place]: Phone: [phone], Fax: [phone]            Again, thank you for allowing me to participate in the care of your patient.        Sincerely,        Juju Blackmon MD

## 2018-09-13 ENCOUNTER — PRENATAL OFFICE VISIT (OUTPATIENT)
Dept: OBGYN | Facility: CLINIC | Age: 33
End: 2018-09-13
Payer: COMMERCIAL

## 2018-09-13 VITALS
BODY MASS INDEX: 30.69 KG/M2 | TEMPERATURE: 98.4 F | HEART RATE: 80 BPM | WEIGHT: 183 LBS | SYSTOLIC BLOOD PRESSURE: 111 MMHG | DIASTOLIC BLOOD PRESSURE: 68 MMHG

## 2018-09-13 DIAGNOSIS — Z34.80 SUPERVISION OF OTHER NORMAL PREGNANCY, ANTEPARTUM: ICD-10-CM

## 2018-09-13 PROCEDURE — 99207 ZZC PRENATAL VISIT: CPT | Performed by: OBSTETRICS & GYNECOLOGY

## 2018-09-13 NOTE — PATIENT INSTRUCTIONS
If you have any questions regarding your visit, Please contact your care team.     [facility] [employer] [facility]: [phone]    Women s Health CLINIC HOURS TELEPHONE NUMBER       CHAY Costa-      Eugene Campbell-Medical Assistant       Monday-[place]  8:00a.m-4:45 p.m  Tuesday-[place]  9:00a.m-11:45 a.m  Wednesday-[place] 8:00a.m-4:45 p.m.  Thursday-[place]  8:00a.m-4:45 p.m.  Friday-[place]  8:00a.m-4:45 p.m. [facility]  [address] [place] [address]  [phone] ask [facility]  [phone] Fax  Imaging [phone]    [facility] Labor [facility] Delivery  [address] [place] [address] [place] [address]  [phone]    [facility]  [address] [place] [address]  [phone] [facility]  [phone] Fax  Imaging [phone]     Urgent Care locations:    [place]        [place] Monday-Friday  5 pm - 9 pm  Saturday and Sunday   9 am - 5 pm    Monday-Friday   11 am - 9 pm  Saturday and Sunday   9 am - 5 pm   [phone]       If you need a medication refill, please contact your pharmacy. Please allow 3 business days for your refill to be completed.  As always, Thank you for trusting us with your healthcare needs!

## 2018-09-13 NOTE — PROGRESS NOTES
No signif signs, symptoms or concerns except some knee pain and left arm paresthesias. Advice appropriate to gestational age reviewed including pertinent Checklist items. Discussed condition, tests and care plan including RBA. Problem list updated. Level 2 ultrasound soon.  A/P:  Ebony was seen today for prenatal care.    Diagnoses and all orders for this visit:    Supervision of other normal pregnancy, antepartum        Fredy Leal MD

## 2018-09-13 NOTE — MR AVS SNAPSHOT
After Visit Summary   9/13/2018    Ebony Jo    MRN: [id number]           Patient Information     Date Of Birth          1985        Visit Information        Provider Department      9/13/2018 9:45 AM Fredy Leal MD [facility]        Today's Diagnoses     Supervision of other normal pregnancy, antepartum          Care Instructions                                                        If you have any questions regarding your visit, Please contact your care team.     [facility]: [phone]    [facility] CLINIC HOURS TELEPHONE NUMBER       CHAY Costa-      Eugene Campbell-Medical Assistant       [facility]  8:00a.m-4:45 p.m  [facility]  9:00a.m-11:45 a.m  [facility] 8:00a.m-4:45 p.m.  Thursday[facility]  8:00a.m-4:45 p.m.  [facility]  8:00a.m-4:45 p.m. [facility]  [address]  [place], [place] [address] [phone] ask for [facility]  [phone] Fax  Imaging [phone]    [facility] Labor and Delivery  [address] [place] [address] [place] [address]  [phone]    [facility]  [address] [place], [place] [address] [phone] ask [facility]  [phone] Fax  Imaging [phone]     Urgent Care locations:    [facility] Monday-Friday  5 pm - 9 pm  Saturday and Sunday   9 am - 5 pm    Monday-Friday   11 am - 9 pm  Saturday and Sunday   9 am - 5 pm   [phone]       If you need a medication refill, please contact your pharmacy. Please allow 3 business days for your refill to be completed.  As always, Thank you for trusting us with your healthcare needs!            Follow-ups after your visit        Who to contact     If you have questions or need follow up information about today's clinic visit or your schedule please contact [facility]  [place] directly at [phone].  Normal or non-critical lab and imaging results will be communicated to you by MyChart, letter or phone within 4 business days after the clinic has received the results. If you do not hear from us within 7 days, please contact the clinic through [employer] or phone. If you have a critical or abnormal lab result, we will notify you by phone as soon as possible.  Submit refill requests through [employer] or call your pharmacy and they will forward the refill request to us. Please allow 3 business days for your refill to be completed.          Additional Information About Your Visit        [employer] Information     [employer] gives you secure access to your electronic health record. If you see a primary care provider, you can also send messages to your care team and make appointments. If you have questions, please call your primary care clinic.  If you do not have a primary care provider, please call [phone] and they will assist you.        Care EveryWhere ID     This is your Care EveryWhere ID. This could be used by other organizations to access your [place] medical records  [id number]        Your Vitals Were     Pulse Temperature Last Period Breastfeeding? BMI (Body Mass Index)       80 98.4  F (36.9  C) (Oral) 05/04/2018 (Exact Date) No 30.69 kg/m2        Blood Pressure from Last 3 Encounters:   09/13/18 111/68   08/17/18 103/64   08/01/18 103/69    Weight from Last 3 Encounters:   09/13/18 183 lb (83 kg)   08/17/18 178 lb (80.7 kg)   08/01/18 177 lb 6 oz (80.5 kg)              Today, you had the following     No orders found for display       Primary Care Provider Office Phone # Fax #    Desire Waldron [phone]       [address]  [place] [address]        Equal Access to Services     BRIDGET BARTLETT : Inocencia hancocko Sodarnell, waaxda luqadaha, qaybta kaalmada adeegyada, waxay corey joshi. So [facility]  [phone].    ATENCIÓN: Si jose r lange, tiene a french disposición servicios gratuitos de asistencia lingüística. Gabe muse [phone].    We comply with applicable federal civil rights laws and [place] laws. We do not discriminate on the basis of race, color, national origin, age, disability, sex, sexual orientation, or gender identity.            Thank you!     Thank you for choosing [facility]  for your care. Our goal is always to provide you with excellent care. Hearing back from our patients is one way we can continue to improve our services. Please take a few minutes to complete the written survey that you may receive in the mail after your visit with us. Thank you!             Your Updated Medication List - Protect others around you: Learn how to safely use, store and throw away your medicines at www.disposemymeds.org.          This list is accurate as of 9/13/18 10:12 AM.  Always use your most recent med list.                   Brand Name Dispense Instructions for use Diagnosis    clindamycin 1 % lotion    CLEOCIN T    60 mL    Apply thin layer to affected areas once to twice daily.    Acne vulgaris       prenatal multivitamin plus iron 27-0.8 MG Tabs per tablet     100 tablet    Take 1 tablet by mouth daily    Pregnancy test positive

## 2018-10-02 ENCOUNTER — PRENATAL OFFICE VISIT (OUTPATIENT)
Dept: OBGYN | Facility: CLINIC | Age: 33
End: 2018-10-02
Payer: COMMERCIAL

## 2018-10-02 ENCOUNTER — TELEPHONE (OUTPATIENT)
Dept: OBGYN | Facility: CLINIC | Age: 33
End: 2018-10-02

## 2018-10-02 VITALS
HEIGHT: 65 IN | TEMPERATURE: 97.5 F | BODY MASS INDEX: 31.02 KG/M2 | OXYGEN SATURATION: 99 % | HEART RATE: 69 BPM | DIASTOLIC BLOOD PRESSURE: 62 MMHG | SYSTOLIC BLOOD PRESSURE: 102 MMHG | WEIGHT: 186.2 LBS

## 2018-10-02 DIAGNOSIS — O09.529 ANTEPARTUM MULTIGRAVIDA OF ADVANCED MATERNAL AGE: Primary | ICD-10-CM

## 2018-10-02 PROCEDURE — 99207 ZZC PRENATAL VISIT: CPT | Performed by: NURSE PRACTITIONER

## 2018-10-02 ASSESSMENT — PAIN SCALES - GENERAL: PAINLEVEL: NO PAIN (0)

## 2018-10-02 NOTE — NURSING NOTE
"Chief Complaint   Patient presents with     Prenatal Care     21w4d/ decreased fetal movement       Initial /62  Pulse 69  Temp 97.5  F (36.4  C) (Oral)  Ht 5' 4.75\" (1.645 m)  Wt 186 lb 3.2 oz (84.5 kg)  LMP 05/04/2018 (Exact Date)  SpO2 99%  BMI 31.23 kg/m2 Estimated body mass index is 31.23 kg/(m^2) as calculated from the following:    Height as of this encounter: 5' 4.75\" (1.645 m).    Weight as of this encounter: 186 lb 3.2 oz (84.5 kg)..  BP completed using cuff size: stuart Mcbride CMA    "

## 2018-10-02 NOTE — TELEPHONE ENCOUNTER
"Phone call from patient requesting a same day appt as she has not been feeling regular fetal movement. She knows for sure the last time she felt baby move was on 09-29-18. Patient states she spoke to her sister and she advised to \"drink a Coke and nothing happened.\" Attempted to reassure patient that she is pretty early in her pregnancy and not going to necessarily feel regular fetal movements until about 26-28 wks. Patient verbalized understanding but requested an appt to be seen to hear heart tones. Patient agreed to be seen by JULIANNA Pizano, CNP today at 0930. Explained will leave her appt with Dr. Leal next week. Patient was very appreciative of assistance. Toyin Baez RN, BAN    "

## 2018-10-02 NOTE — PROGRESS NOTES
Patient presents for problem prenatal visit. Prenatal flowsheet reviewed and updated as needed.  Denies vaginal bleeding, loss of fluid, contractions or cramping.  Patient called this morning with concern of not feeling fetal movement for the last 3 days. Last definite movement was 9/29/18. Patient tried to drink a caffeine to see if she could get movement with no luck. Has been feeling movement at least once daily since about 17 weeks.   Ultrasound with MFM showed an anterior placenta.  Will get flu shot at work.    Advice as per Anticipatory Guidance/Checklist updated.  PE: See OB vitals    Patient reassured upon hearing heart tones-auscultated for lengthy time with doptones. Baby was very active per doptones. We discussed anterior placenta, regular fetal movement. Patient feels reassured, will keep her scheduled prenatal visit next week.  Encouraged to call with concerns.     Questions asked and answered.    Ashley LEVINE CNP

## 2018-10-02 NOTE — MR AVS SNAPSHOT
After Visit Summary   10/2/2018    Ebony Jo    MRN: [id number]           Patient Information     Date Of Birth          1985        Visit Information        Provider Department      10/2/2018 9:30 AM Ashley Mauricio APRN CNP [facility]        Today's Diagnoses     Antepartum multigravida of advanced maternal age    -  1       Follow-ups after your visit        Your next 10 appointments already scheduled     Oct 10, 2018  1:45 PM CDT   ESTABLISHED PRENATAL with Fredy Leal MD   [facility] ([facility])    [address] [place] [address] [phone]              Who to contact     If you have questions or need follow up information about today's clinic visit or your schedule please contact [facility] directly at [phone].  Normal or non-critical lab and imaging results will be communicated to you by MyChart, letter or phone within 4 business days after the clinic has received the results. If you do not hear from us within 7 days, please contact the clinic through MyChart or phone. If you have a critical or abnormal lab result, we will notify you by phone as soon as possible.  Submit refill requests through Yesmail or call your pharmacy and they will forward the refill request to us. Please allow 3 business days for your refill to be completed.          Additional Information About Your Visit        MyChart Information     Yesmail gives you secure access to your electronic health record. If you see a primary care provider, you can also send messages to your care team and make appointments. If you have questions, please call your primary care clinic.  If you do not have a primary care provider, please call [phone] and they will assist you.        Care EveryWhere ID     This is your Care EveryWhere ID. This could be used by other organizations to access your [facility]  "records  [id number]        Your Vitals Were     Pulse Temperature Height Last Period Pulse Oximetry BMI (Body Mass Index)    69 97.5  F (36.4  C) (Oral) 5' 4.75\" (1.645 m) 05/04/2018 (Exact Date) 99% 31.23 kg/m2       Blood Pressure from Last 3 Encounters:   10/02/18 102/62   09/13/18 111/68   08/17/18 103/64    Weight from Last 3 Encounters:   10/02/18 186 lb 3.2 oz (84.5 kg)   09/13/18 183 lb (83 kg)   08/17/18 178 lb (80.7 kg)              Today, you had the following     No orders found for display       Primary Care Provider Office Phone # Fax #    Desire Elis Waldron [phone] [address] [place]  [facility] [address]        Equal Access to Services     [facility]: Hadii aad ku hadasho Soomaali, waaxda luqadaha, qaybta kaalmada adeegyada, agapito razo . So [facility] [phone].    ATENCIÓN: Si jose r lange, tiene a french disposición servicios gratuitos de asistencia lingüística. Llame al [phone].    We comply with applicable federal civil rights laws and [place] laws. We do not discriminate on the basis of race, color, national origin, age, disability, sex, sexual orientation, or gender identity.            Thank you!     Thank you for choosing [facility]  for your care. Our goal is always to provide you with excellent care. Hearing back from our patients is one way we can continue to improve our services. Please take a few minutes to complete the written survey that you may receive in the mail after your visit with us. Thank you!             Your Updated Medication List - Protect others around you: Learn how to safely use, store and throw away your medicines at www.disposemymeds.org.          This list is accurate as of 10/2/18 10:14 AM.  Always use your most recent med list.                   Brand Name Dispense Instructions for use Diagnosis    clindamycin 1 % lotion    CLEOCIN T    60 mL    Apply thin layer to affected " areas once to twice daily.    Acne vulgaris       prenatal multivitamin plus iron 27-0.8 MG Tabs per tablet     100 tablet    Take 1 tablet by mouth daily    Pregnancy test positive

## 2018-10-10 ENCOUNTER — PRENATAL OFFICE VISIT (OUTPATIENT)
Dept: OBGYN | Facility: CLINIC | Age: 33
End: 2018-10-10
Payer: COMMERCIAL

## 2018-10-10 VITALS
DIASTOLIC BLOOD PRESSURE: 71 MMHG | HEART RATE: 94 BPM | WEIGHT: 189 LBS | BODY MASS INDEX: 31.69 KG/M2 | SYSTOLIC BLOOD PRESSURE: 129 MMHG | TEMPERATURE: 98.2 F

## 2018-10-10 DIAGNOSIS — Z34.80 SUPERVISION OF OTHER NORMAL PREGNANCY, ANTEPARTUM: ICD-10-CM

## 2018-10-10 PROCEDURE — 99207 ZZC PRENATAL VISIT: CPT | Performed by: OBSTETRICS & GYNECOLOGY

## 2018-10-10 NOTE — PATIENT INSTRUCTIONS
If you have any questions regarding your visit, Please contact your care team.     [employer]: [phone]    Women s Health CLINIC HOURS TELEPHONE NUMBER       CHAY Costa-      Eugene Campbell-Medical Assistant       Monday-[place]  8:00a.m-4:45 p.m  Tuesday-[place]  9:00a.m-11:45 a.m  Wednesday-[place] 8:00a.m-4:45 p.m.  Thursday-[place]  8:00a.m-4:45 p.m.  Friday-[place]  8:00a.m-4:45 p.m. [facility]  [address] [place] [address]  [phone] ask [facility]  [phone] Fax  Imaging [id number]-[phone]    [facility] Labor and Delivery  [address] [place] [address] [place] [address]  [phone]    [facility]  [address] [place] [address]  [phone] [facility]  [phone] Fax  Imaging [phone]     Urgent Care locations:    [facility]        [place] Monday-Friday  5 pm - 9 pm  Saturday and Sunday   9 am - 5 pm    Monday-Friday   11 am - 9 pm  Saturday and Sunday   9 am - 5 pm   [phone]       If you need a medication refill, please contact your pharmacy. Please allow 3 business days for your refill to be completed.  As always, Thank you for trusting us with your healthcare needs!

## 2018-10-10 NOTE — MR AVS SNAPSHOT
After Visit Summary   10/10/2018    Ebony Jo    MRN: [id number]           Patient Information     Date Of Birth          1985        Visit Information        Provider Department      10/10/2018 1:45 PM Fredy Leal MD [facility]        Today's Diagnoses     Supervision of other normal pregnancy, antepartum          Care Instructions                                                        If you have any questions regarding your visit, Please contact your care team.     [facility] Services: [phone]    [facility] CLINIC HOURS TELEPHONE NUMBER       CHAY Costa-      Eugene Campbell-Medical Assistant       Monday-[place]  8:00a.m-4:45 p.m  [facility]  9:00a.m-11:45 a.m  [facility] 8:00a.m-4:45 p.m.  Thursday[facility]  8:00a.m-4:45 p.m.  [facility]  8:00a.m-4:45 p.m. [facility]  [address] [place]  [place], [place] [address] [phone] ask for [facility]  [phone] Fax  Imaging [phone]    [facility] Labor and Delivery  [address] [place] [address] [place] [address]  [phone]    [facility]  [address] [place], [place] [address] [phone] ask [facility]  [phone] Fax  Imaging [phone]     Urgent Care locations:    [facility] Monday-Friday  5 pm - 9 pm  Saturday and Sunday   9 am - 5 pm    Monday-Friday   11 am - 9 pm  Saturday and Sunday   9 am - 5 pm   [phone]       If you need a medication refill, please contact your pharmacy. Please allow 3 business days for your refill to be completed.  As always, Thank you for trusting us with your healthcare needs!            Follow-ups after your visit        Who to contact     If you have questions or need follow up information about today's clinic visit or your schedule please contact [facility]  [place] directly at [phone].  Normal or non-critical lab and imaging results will be communicated to you by MyChart, letter or phone within 4 business days after the clinic has received the results. If you do not hear from us within 7 days, please contact the clinic through ZALPhart or phone. If you have a critical or abnormal lab result, we will notify you by phone as soon as possible.  Submit refill requests through [email or web address] or call your pharmacy and they will forward the refill request to us. Please allow 3 business days for your refill to be completed.          Additional Information About Your Visit        [employer] Information     [email or web address] gives you secure access to your electronic health record. If you see a primary care provider, you can also send messages to your care team and make appointments. If you have questions, please call your primary care clinic.  If you do not have a primary care provider, please call [phone] and they will assist you.        Care EveryWhere ID     This is your Care EveryWhere ID. This could be used by other organizations to access your [facility] medical records  [id number]        Your Vitals Were     Pulse Temperature Last Period Breastfeeding? BMI (Body Mass Index)       94 98.2  F (36.8  C) (Oral) 05/04/2018 (Exact Date) No 31.69 kg/m2        Blood Pressure from Last 3 Encounters:   10/10/18 129/71   10/02/18 102/62   09/13/18 111/68    Weight from Last 3 Encounters:   10/10/18 189 lb (85.7 kg)   10/02/18 186 lb 3.2 oz (84.5 kg)   09/13/18 183 lb (83 kg)              Today, you had the following     No orders found for display       Primary Care Provider Office Phone # Fax #    Desire Elis Waldron [phone]       [address]  [place] [address]        Equal Access to Services     [facility] : Inocencia giron Sodarnell, waaxda luqadaha, qaybta kaalmada adeegyada, agapito joshi. So [facility]  [phone].    ATENCIÓN: Si jose r lange, tiene a french disposición servicios gratuitos de asistencia lingüística. Gabe muse [phone].    We comply with applicable federal civil rights laws and [place] laws. We do not discriminate on the basis of race, color, national origin, age, disability, sex, sexual orientation, or gender identity.            Thank you!     Thank you for choosing [facility]  for your care. Our goal is always to provide you with excellent care. Hearing back from our patients is one way we can continue to improve our services. Please take a few minutes to complete the written survey that you may receive in the mail after your visit with us. Thank you!             Your Updated Medication List - Protect others around you: Learn how to safely use, store and throw away your medicines at www.disposemymeds.org.          This list is accurate as of 10/10/18  1:56 PM.  Always use your most recent med list.                   Brand Name Dispense Instructions for use Diagnosis    clindamycin 1 % lotion    CLEOCIN T    60 mL    Apply thin layer to affected areas once to twice daily.    Acne vulgaris       prenatal multivitamin plus iron 27-0.8 MG Tabs per tablet     100 tablet    Take 1 tablet by mouth daily    Pregnancy test positive

## 2018-10-11 NOTE — PROGRESS NOTES
No signif signs, symptoms or concerns. Advice appropriate to gestational age reviewed including pertinent Checklist items. Discussed condition, tests and care plan including RBA. Problem list updated. 1h GTT next.  A/P:  Ebony was seen today for prenatal care.    Diagnoses and all orders for this visit:    Supervision of other normal pregnancy, antepartum        Fredy Leal MD

## 2018-11-16 ENCOUNTER — PRENATAL OFFICE VISIT (OUTPATIENT)
Dept: OBGYN | Facility: CLINIC | Age: 33
End: 2018-11-16
Payer: COMMERCIAL

## 2018-11-16 VITALS
TEMPERATURE: 98.3 F | BODY MASS INDEX: 32.2 KG/M2 | DIASTOLIC BLOOD PRESSURE: 71 MMHG | HEART RATE: 105 BPM | SYSTOLIC BLOOD PRESSURE: 116 MMHG | WEIGHT: 192 LBS

## 2018-11-16 DIAGNOSIS — Z34.80 SUPERVISION OF OTHER NORMAL PREGNANCY, ANTEPARTUM: ICD-10-CM

## 2018-11-16 LAB
GLUCOSE 1H P 50 G GLC PO SERPL-MCNC: 154 MG/DL (ref 60–129)
HGB BLD-MCNC: 11.3 G/DL (ref 11.7–15.7)

## 2018-11-16 PROCEDURE — 36415 COLL VENOUS BLD VENIPUNCTURE: CPT | Performed by: OBSTETRICS & GYNECOLOGY

## 2018-11-16 PROCEDURE — 99207 ZZC PRENATAL VISIT: CPT | Performed by: OBSTETRICS & GYNECOLOGY

## 2018-11-16 PROCEDURE — 00000218 ZZHCL STATISTIC OBHBG - HEMOGLOBIN: Performed by: OBSTETRICS & GYNECOLOGY

## 2018-11-16 PROCEDURE — 82950 GLUCOSE TEST: CPT | Performed by: OBSTETRICS & GYNECOLOGY

## 2018-11-16 NOTE — PROGRESS NOTES
No signif signs, symptoms or concerns except moderate low back pain. Instructed her on back pain and she will need intermittent FMLA time off due to her condition. Forms are completed. Discussed possible light duty work later. Plans nature labor, no epidural. Advice appropriate to gestational age reviewed including pertinent Checklist items. Discussed condition, tests and care plan including RBA. Problem list updated.    A/P:  Ebony was seen today for prenatal care.    Diagnoses and all orders for this visit:    Supervision of other normal pregnancy, antepartum  -     Glucose tolerance gest screen 1 hour  -     OB hemoglobin      Fredy Leal MD

## 2018-11-16 NOTE — MR AVS SNAPSHOT
After Visit Summary   11/16/2018    Ebony Jo    MRN: [id number]           Patient Information     Date Of Birth          1985        Visit Information        Provider Department      11/16/2018 9:00 AM Fredy Leal MD [facility]        Today's Diagnoses     Supervision of other normal pregnancy, antepartum          Care Instructions                                                        If you have any questions regarding your visit, Please contact your care team.     [facility] Services: [phone]    [facility] CLINIC HOURS TELEPHONE NUMBER       CHAY Costa-      Eugene Campbell-Medical Assistant       [facility]  8:00[facility].m-4:45 p.m  [facility]  9:00a.m-11:45 a.m  Wednesday[facility] 8:00a.m-4:45 p.m.  Thursday[facility]  8:00a.m-4:45 p.m.  Friday[facility]  8:00a.m-4:45 p.m. [facility]  [address]  [place], [place] [address] [phone] ask for [facility]  [phone] Fax  Imaging [phone]    [facility] Labor and Delivery  [address] [place] [address] [place] [address]  [phone]    [facility]  [address] [place], [place] [address] [phone] ask [facility]  [phone] Fax  Imaging [phone]     Urgent Care locations:    [facility] Monday-Friday  5 pm - 9 pm  Saturday and Sunday   9 am - 5 pm    Monday-Friday   11 am - 9 pm  Saturday and Sunday   9 am - 5 pm   [phone]       If you need a medication refill, please contact your pharmacy. Please allow 3 business days for your refill to be completed.  As always, Thank you for trusting us with your healthcare needs!            Follow-ups after your visit        Who to contact     If you have questions or need follow up information about today's clinic visit or your schedule please contact [facility]  [place] directly at [phone].  Normal or non-critical lab and imaging results will be communicated to you by MyChart, letter or phone within 4 business days after the clinic has received the results. If you do not hear from us within 7 days, please contact the clinic through wriplhart or phone. If you have a critical or abnormal lab result, we will notify you by phone as soon as possible.  Submit refill requests through [employer] or call your pharmacy and they will forward the refill request to us. Please allow 3 business days for your refill to be completed.          Additional Information About Your Visit        [employer] Information     [employer] gives you secure access to your electronic health record. If you see a primary care provider, you can also send messages to your care team and make appointments. If you have questions, please call your primary care clinic.  If you do not have a primary care provider, please call [phone] and they will assist you.        Care EveryWhere ID     This is your Care EveryWhere ID. This could be used by other organizations to access your [place] medical records  [id number]        Your Vitals Were     Pulse Temperature Last Period Breastfeeding? BMI (Body Mass Index)       105 98.3  F (36.8  C) (Oral) 05/04/2018 (Exact Date) No 32.2 kg/m2        Blood Pressure from Last 3 Encounters:   11/16/18 116/71   10/10/18 129/71   10/02/18 102/62    Weight from Last 3 Encounters:   11/16/18 192 lb (87.1 kg)   10/10/18 189 lb (85.7 kg)   10/02/18 186 lb 3.2 oz (84.5 kg)              Today, you had the following     No orders found for display       Primary Care Provider Office Phone # Fax #    Desire Elis Waldron [phone]       [address]  [place] [address]        Equal Access to Services     [facility] : Inocencia hancocko Sodarnell, waaxda luqadaha, qaybta kaalmada adeegyada, agapito joshi. So [facility]  [phone].    ATENCIÓN: Si jose r lange, tiene a french disposición servicios gratuitos de asistencia lingüística. Gabe muse [phone].    We comply with applicable federal civil rights laws and [place] laws. We do not discriminate on the basis of race, color, national origin, age, disability, sex, sexual orientation, or gender identity.            Thank you!     Thank you for choosing [facility]  for your care. Our goal is always to provide you with excellent care. Hearing back from our patients is one way we can continue to improve our services. Please take a few minutes to complete the written survey that you may receive in the mail after your visit with us. Thank you!             Your Updated Medication List - Protect others around you: Learn how to safely use, store and throw away your medicines at www.disposemymeds.org.          This list is accurate as of 11/16/18  9:34 AM.  Always use your most recent med list.                   Brand Name Dispense Instructions for use Diagnosis    clindamycin 1 % lotion    CLEOCIN T    60 mL    Apply thin layer to affected areas once to twice daily.    Acne vulgaris       prenatal multivitamin plus iron 27-0.8 MG Tabs per tablet     100 tablet    Take 1 tablet by mouth daily    Pregnancy test positive

## 2018-11-16 NOTE — PATIENT INSTRUCTIONS
If you have any questions regarding your visit, Please contact your care team.     [employer] Services: [phone]    Women s Health CLINIC HOURS TELEPHONE NUMBER       CHAY Costa-      Eugene Campbell-Medical Assistant       Monday-[place]  8:00a.m-4:45 p.m  Tuesday-[place]  9:00a.m-11:45 a.m  Wednesday-[place] 8:00a.m-4:45 p.m.  Thursday-[place]  8:00a.m-4:45 p.m.  Friday-[place]  8:00a.m-4:45 p.m. [facility]  [address] [place] [address]  [phone] ask [facility]  [phone] Fax  Imaging [phone]    [facility] Labor and Delivery  [address] [place] [address] [place] [address]  [phone]    [facility]  [address] [address]  [phone] [facility]  [phone] Fax  Imaging [phone]     Urgent Care locations:    [place]        [place] Monday-Friday  5 pm - 9 pm  Saturday and Sunday   9 am - 5 pm    Monday-Friday   11 am - 9 pm  Saturday and Sunday   9 am - 5 pm   [phone]       If you need a medication refill, please contact your pharmacy. Please allow 3 business days for your refill to be completed.  As always, Thank you for trusting us with your healthcare needs!

## 2018-11-19 DIAGNOSIS — Z34.80 SUPERVISION OF OTHER NORMAL PREGNANCY, ANTEPARTUM: ICD-10-CM

## 2018-11-19 LAB
GLUCOSE 1H P 100 G GLC PO SERPL-MCNC: 144 MG/DL (ref 60–179)
GLUCOSE 2H P 100 G GLC PO SERPL-MCNC: 121 MG/DL (ref 60–154)
GLUCOSE 3H P 100 G GLC PO SERPL-MCNC: 97 MG/DL (ref 60–139)
GLUCOSE P FAST SERPL-MCNC: 82 MG/DL (ref 60–94)

## 2018-11-19 PROCEDURE — 82951 GLUCOSE TOLERANCE TEST (GTT): CPT | Performed by: OBSTETRICS & GYNECOLOGY

## 2018-11-19 PROCEDURE — 82952 GTT-ADDED SAMPLES: CPT | Performed by: OBSTETRICS & GYNECOLOGY

## 2018-11-19 PROCEDURE — 36415 COLL VENOUS BLD VENIPUNCTURE: CPT | Performed by: OBSTETRICS & GYNECOLOGY

## 2018-11-30 ENCOUNTER — PRENATAL OFFICE VISIT (OUTPATIENT)
Dept: OBGYN | Facility: CLINIC | Age: 33
End: 2018-11-30
Payer: COMMERCIAL

## 2018-11-30 VITALS
DIASTOLIC BLOOD PRESSURE: 70 MMHG | WEIGHT: 195 LBS | BODY MASS INDEX: 32.7 KG/M2 | TEMPERATURE: 97.9 F | SYSTOLIC BLOOD PRESSURE: 123 MMHG | HEART RATE: 86 BPM

## 2018-11-30 DIAGNOSIS — Z34.80 SUPERVISION OF OTHER NORMAL PREGNANCY, ANTEPARTUM: ICD-10-CM

## 2018-11-30 PROCEDURE — 99207 ZZC PRENATAL VISIT: CPT | Performed by: OBSTETRICS & GYNECOLOGY

## 2018-11-30 NOTE — PATIENT INSTRUCTIONS
If you have any questions regarding your visit, Please contact your care team.     [employer] [facility]: [phone]    [facility] CLINIC HOURS TELEPHONE NUMBER       Fredy Leal M.D.      Ariana-    Eugene Campbell-Medical Assistant       Monday-[place]  8:00a.m-4:45 p.m  Tuesday-[place]  9:00a.m-11:45 a.m  Wednesday-[place] 8:00a.m-4:45 p.m.  Thursday-[place]  8:00a.m-4:45 p.m.  Friday-[place]  8:00a.m-4:45 p.m. [facility]  [address] [place] [address]  [phone] ask for [facility]  [phone] Fax  Imaging [phone]    [facility] Labor and Delivery  [address] [place] [address] [place] [address]  [phone]    [facility]  [address] [place] [address]  [phone] ask [facility]  [phone] Fax  Imaging Ngwiglpvqm-[phone]     Urgent Care locations:    [facility] Monday-Friday  5 pm - 9 pm  Saturday and Sunday   9 am - 5 pm    Monday-Friday   11 am - 9 pm  Saturday and Sunday   9 am - 5 pm   [phone]       If you need a medication refill, please contact your pharmacy. Please allow 3 business days for your refill to be completed.  As always, Thank you for trusting us with your healthcare needs!

## 2018-11-30 NOTE — MR AVS SNAPSHOT
After Visit Summary   11/30/2018    Ebony Jo    MRN: [id number]           Patient Information     Date Of Birth          1985        Visit Information        Provider Department      11/30/2018 4:15 PM Frdey Leal MD [facility]        Today's Diagnoses     Supervision of other normal pregnancy, antepartum          Care Instructions                                                        If you have any questions regarding your visit, Please contact your care team.     [facility]: [phone]    [facility] CLINIC HOURS TELEPHONE NUMBER       Fredy Leal M.D.      Ariana-    Eugene Campbell-Medical Assistant       Monday-[place]  8:00a.m-4:45 p.m  Tuesday-[place]  9:00a.m-11:45 a.m  Wednesday-[place] 8:00a.m-4:45 p.m.  Thursday-[place]  8:00a.m-4:45 p.m.  Friday-[place]  8:00a.m-4:45 p.m. [facility]  [address] [place]  [place], [place] [address] [phone] ask [facility]  [phone] Fax  Imaging [phone]    [facility] Labor and Delivery  [address] [place] [address] Dr.  [place], [place] [address] [phone]    [facility]  [address] [facility] [place] [address] [phone] ask [facility]  [phone] Fax  Imaging [phone]     Urgent Care locations:    [facility] Monday-Friday  5 pm - 9 pm  Saturday and Sunday   9 am - 5 pm    Monday-Friday   11 am - 9 pm  Saturday and Sunday   9 am - 5 pm   [phone]       If you need a medication refill, please contact your pharmacy. Please allow 3 business days for your refill to be completed.  As always, Thank you for trusting us with your healthcare needs!            Follow-ups after your visit        Your next 10 appointments already scheduled     Nov 30, 2018  4:15 PM CST   ESTABLISHED PRENATAL with Fredy Leal MD   [facility] ([facility]    [address] [id number] [phone]            Dec 14, 2018  8:00 AM CST   ESTABLISHED PRENATAL with Fredy Leal MD   [facility]    [address] [address]   [phone]              Who to contact     If you have questions or need follow up information about today's clinic visit or your schedule please contact [facility] directly at [phone].  Normal or non-critical lab and imaging results will be communicated to you by MyChart, letter or phone within 4 business days after the clinic has received the results. If you do not hear from us within 7 days, please contact the clinic through [employer] or phone. If you have a critical or abnormal lab result, we will notify you by phone as soon as possible.  Submit refill requests through [employer] or call your pharmacy and they will forward the refill request to us. Please allow 3 business days for your refill to be completed.          Additional Information About Your Visit        [employer] Information     [employer] gives you secure access to your electronic health record. If you see a primary care provider, you can also send messages to your care team and make appointments. If you have questions, please call your primary care clinic.  If you do not have a primary care provider, please call [phone] and they will assist you.        Care EveryWhere ID     This is your Care EveryWhere ID. This could be used by other organizations to access your [place] medical records  [id number]        Your Vitals Were     Pulse Temperature Last Period Breastfeeding? BMI (Body Mass Index)       86 97.9  F (36.6  C) (Oral) 05/04/2018 (Exact Date) No 32.7 kg/m2        Blood Pressure from Last 3 Encounters:   11/30/18 123/70   11/16/18 116/71   10/10/18 129/71    Weight from Last 3 Encounters:    11/30/18 195 lb (88.5 kg)   11/16/18 192 lb (87.1 kg)   10/10/18 189 lb (85.7 kg)              Today, you had the following     No orders found for display       Primary Care Provider Office Phone # Fax #    Desire Gillis Tasia [place] [phone]       [address]  [facility] [address]        Equal Access to Services     JULIOCESAR [facility] Hospital[facility]ENNIFER : Hadii aad ku hadasho Soomaali, waaxda luqadaha, qaybta kaalmada adeegyada, waxay idiin hayaan adeeg kharash la'aan . So [facility] [phone].    ATENCIÓN: Si habla español, tiene a french disposición servicios gratuitos de asistencia lingüística. Llame al [phone].    We comply with applicable federal civil rights laws and [place] laws. We do not discriminate on the basis of race, color, national origin, age, disability, sex, sexual orientation, or gender identity.            Thank you!     Thank you for choosing [facility]  for your care. Our goal is always to provide you with excellent care. Hearing back from our patients is one way we can continue to improve our services. Please take a few minutes to complete the written survey that you may receive in the mail after your visit with us. Thank you!             Your Updated Medication List - Protect others around you: Learn how to safely use, store and throw away your medicines at www.disposemymeds.org.          This list is accurate as of 11/30/18  4:14 PM.  Always use your most recent med list.                   Brand Name Dispense Instructions for use Diagnosis    clindamycin 1 % external lotion    CLEOCIN T    60 mL    Apply thin layer to affected areas once to twice daily.    Acne vulgaris       prenatal multivitamin w/iron 27-0.8 MG tablet     100 tablet    Take 1 tablet by mouth daily    Pregnancy test positive

## 2018-11-30 NOTE — PROGRESS NOTES
No signif signs, symptoms or concerns except some mood irritability. Advice appropriate to gestational age reviewed including pertinent Checklist items. Discussed condition, tests and care plan including RBA. Problem list updated. Considering Tdap next.  A/P:  Ebony was seen today for prenatal care.    Diagnoses and all orders for this visit:    Supervision of other normal pregnancy, antepartum        Fredy Leal MD

## 2018-12-13 NOTE — PATIENT INSTRUCTIONS
If you have any questions regarding your visit, Please contact your care team.     [facility]: [phone]    [facility] CLINIC HOURS TELEPHONE NUMBER       Fredy Leal M.D.      Ariana-    Eugene Campbell-Medical Assistant       Monday-[place]  8:00a.m-4:45 p.m  Tuesday-[place]  9:00a.m-11:45 a.m  Wednesday-[place] 8:00a.m-4:45 p.m.  Thursday-[place]  8:00a.m-4:45 p.m.  Friday-[place]  8:00a.m-4:45 p.m. [facility]  [address] [place] [address]  [phone] ask for [facility]  [phone] Fax  Imaging [phone]    [facility] Labor and Delivery  [address] [place] [address] [place] [address]  [phone]    [facility]  [address] [place] [address]  [phone] ask [facility]  [phone] Fax  Imaging [phone]     Urgent Care locations:    [facility] Monday-Friday  5 pm - 9 pm  Saturday and Sunday   9 am - 5 pm    Monday-Friday   11 am - 9 pm  Saturday and Sunday   9 am - 5 pm   [phone]       If you need a medication refill, please contact your pharmacy. Please allow 3 business days for your refill to be completed.  As always, Thank you for trusting us with your healthcare needs!

## 2018-12-14 ENCOUNTER — PRENATAL OFFICE VISIT (OUTPATIENT)
Dept: OBGYN | Facility: CLINIC | Age: 33
End: 2018-12-14
Payer: COMMERCIAL

## 2018-12-14 VITALS
HEART RATE: 81 BPM | DIASTOLIC BLOOD PRESSURE: 70 MMHG | WEIGHT: 199 LBS | BODY MASS INDEX: 33.37 KG/M2 | TEMPERATURE: 97.4 F | SYSTOLIC BLOOD PRESSURE: 119 MMHG

## 2018-12-14 DIAGNOSIS — Z23 NEED FOR TDAP VACCINATION: Primary | ICD-10-CM

## 2018-12-14 DIAGNOSIS — Z34.80 SUPERVISION OF OTHER NORMAL PREGNANCY, ANTEPARTUM: ICD-10-CM

## 2018-12-14 PROCEDURE — 90715 TDAP VACCINE 7 YRS/> IM: CPT | Performed by: OBSTETRICS & GYNECOLOGY

## 2018-12-14 PROCEDURE — 90471 IMMUNIZATION ADMIN: CPT | Performed by: OBSTETRICS & GYNECOLOGY

## 2018-12-14 PROCEDURE — 99207 ZZC PRENATAL VISIT: CPT | Mod: 25 | Performed by: OBSTETRICS & GYNECOLOGY

## 2018-12-14 NOTE — PROGRESS NOTES
No signif signs, symptoms or concerns except tired and moving slower due to pregnancy discomforts. Advise light duty work- letter given. Tdap given. Advice appropriate to gestational age reviewed including pertinent Checklist items. Discussed condition, tests and care plan including RBA. Problem list updated.   A/P:  Ebony was seen today for prenatal care.    Diagnoses and all orders for this visit:    Need for Tdap vaccination  -     TDAP VACCINE (ADACEL)  -     VACCINE ADMINISTRATION, INITIAL    Supervision of other normal pregnancy, antepartum  -     TDAP VACCINE (ADACEL)  -     VACCINE ADMINISTRATION, INITIAL        Fredy Leal MD

## 2019-01-02 NOTE — PATIENT INSTRUCTIONS
If you have any questions regarding your visit, Please contact your care team.     [facility] [facility]: [phone]    [facility] CLINIC HOURS TELEPHONE NUMBER       Fredy Leal M.D.      Ariana-    Eugene Campbell-Medical Assistant       Monday-[place]  8:00a.m-4:45 p.m  Tuesday-[place]  9:00a.m-11:45 a.m  Wednesday-[place] 8:00a.m-4:45 p.m.  Thursday-[place]  8:00a.m-4:45 p.m.  Friday-[place]  8:00a.m-4:45 p.m. [facility]  [address] [place] [address]  [phone] ask for [facility]  [phone] Fax  Imaging [phone]    [facility] Delivery  [address] [place] [address] [place] [address]  [phone]    [facility]  [address] [place] [address]  [phone] ask [facility]  [phone] Fax  Imaging [phone]     Urgent Care locations:    [facility] Monday-Friday  5 pm - 9 pm  Saturday and Sunday   9 am - 5 pm    Monday-Friday   11 am - 9 pm  Saturday and Sunday   9 am - 5 pm   [phone]       If you need a medication refill, please contact your pharmacy. Please allow 3 business days for your refill to be completed.  As always, Thank you for trusting us with your healthcare needs!

## 2019-01-03 ENCOUNTER — PRENATAL OFFICE VISIT (OUTPATIENT)
Dept: OBGYN | Facility: CLINIC | Age: 34
End: 2019-01-03
Payer: COMMERCIAL

## 2019-01-03 VITALS
HEART RATE: 85 BPM | SYSTOLIC BLOOD PRESSURE: 122 MMHG | TEMPERATURE: 98 F | BODY MASS INDEX: 33.87 KG/M2 | WEIGHT: 202 LBS | DIASTOLIC BLOOD PRESSURE: 73 MMHG

## 2019-01-03 DIAGNOSIS — Z34.80 SUPERVISION OF OTHER NORMAL PREGNANCY, ANTEPARTUM: ICD-10-CM

## 2019-01-03 PROCEDURE — 99207 ZZC PRENATAL VISIT: CPT | Performed by: OBSTETRICS & GYNECOLOGY

## 2019-01-03 NOTE — PROGRESS NOTES
No signif signs, symptoms or concerns except tired and pregnancy discomforts. Some BH contractions. Now on light duty work. Advice appropriate to gestational age reviewed including pertinent Checklist items. Discussed condition, tests and care plan including RBA. Problem list updated. GBS next.  A/P:  Ebony was seen today for prenatal care.    Diagnoses and all orders for this visit:    Supervision of other normal pregnancy, antepartum        Fredy Leal MD

## 2019-01-07 ENCOUNTER — MYC MEDICAL ADVICE (OUTPATIENT)
Dept: OBGYN | Facility: CLINIC | Age: 34
End: 2019-01-07

## 2019-01-15 ENCOUNTER — PRENATAL OFFICE VISIT (OUTPATIENT)
Dept: OBGYN | Facility: CLINIC | Age: 34
End: 2019-01-15
Payer: COMMERCIAL

## 2019-01-15 VITALS
TEMPERATURE: 98.3 F | DIASTOLIC BLOOD PRESSURE: 69 MMHG | WEIGHT: 202.4 LBS | SYSTOLIC BLOOD PRESSURE: 110 MMHG | BODY MASS INDEX: 33.94 KG/M2 | OXYGEN SATURATION: 99 % | HEART RATE: 76 BPM

## 2019-01-15 DIAGNOSIS — Z34.80 SUPERVISION OF OTHER NORMAL PREGNANCY, ANTEPARTUM: ICD-10-CM

## 2019-01-15 PROCEDURE — 87653 STREP B DNA AMP PROBE: CPT | Performed by: OBSTETRICS & GYNECOLOGY

## 2019-01-15 PROCEDURE — 99207 ZZC PRENATAL VISIT: CPT | Performed by: OBSTETRICS & GYNECOLOGY

## 2019-01-15 NOTE — PATIENT INSTRUCTIONS
If you have any questions regarding your visit, Please contact your care team.      [employer] [facility]: [phone]     [facility] CLINIC HOURS TELEPHONE NUMBER   CHAY Costa-     Eugene Campbell-Medical Assistant Monday-[place]  8:00a.m-4:45 p.m  Wednesday-[place] 8:00a.m-4:45 p.m.  Thursday-[place] 8:00a.m-4:45 p.m.  Friday-[place]  8:00a.m-4:45 p.m. [facility]  [address] [place] [address]  [phone] ask [facility]  [phone] Fax  Imaging [phone]     [facility] Labor and Delivery  [address] [place] [address] [place] [address]  [phone]     [facility]  [address] [place] [address]  [phone] [facility]  [phone] Fax  Imaging [phone]      Urgent Care locations:    [place] Monday-Friday  5 pm - 9 pm  Saturday and Sunday   9 am - 5 pm     Monday-Friday   11 am - 9 pm  Saturday and Sunday  9 am - 5 pm    [phone]         If you need a medication refill, please contact your pharmacy. Please allow 3 business days for your refill to be completed.  As always, Thank you for trusting us with your healthcare needs!

## 2019-01-16 ENCOUNTER — TELEPHONE (OUTPATIENT)
Dept: OBGYN | Facility: CLINIC | Age: 34
End: 2019-01-16

## 2019-01-16 LAB
GP B STREP DNA SPEC QL NAA+PROBE: NEGATIVE
SPECIMEN SOURCE: NORMAL

## 2019-01-16 NOTE — PROGRESS NOTES
No signif signs, symptoms or concerns except URI now. Breech presentation confirmed on limited OB ultrasound by me with normal AFV but tight abd and size suggest poor prognosis for ext version. Advise  section and discussed in detail and patient agrees. Will arrange this at 39 weeks. Advice appropriate to gestational age reviewed including pertinent Checklist items. Discussed condition, tests and care plan including RBA. Problem list updated. Preop next.  A/P:  Ebony was seen today for prenatal care.    Diagnoses and all orders for this visit:    Breech presentation, single or unspecified fetus  -     Tomasa-Operative Worksheet    Supervision of other normal pregnancy, antepartum  -     Group B strep PCR  -     Tomasa-Operative Worksheet        Fredy Leal MD

## 2019-01-16 NOTE — TELEPHONE ENCOUNTER
Associated Diagnoses     Breech presentation, single or unspecified fetus  - Primary       Supervision of other normal pregnancy, antepartum       Comments     Please schedule surgery as noted. Block out clinic time in Epic as needed.   NPO 8 hours prior to procedure time and shower the night before and the morning of surgery if able, using clean towels, sheets, and clothing afterward.          Order Questions     Question Answer Comment   Procedure name(s) - multi select  Delivery    Reason for procedure breech presentation    Surgeon: Mel    Is this a multi surgeon case? No    Laterality N/A    Request for additional equipment Other (see comments) None   Anesthesia Spinal    Positioning (if different from preference card): Supine    Is the patient known to have any of the following? None of the above    Initiate Pre-op orders for above procedure: Yes, as ordered in Epic additional orders noted there also   Location of Case: [facility]    Operating room  requested: Yes    Urgency of Surgery: Routine    Surgeon Procedure Time (incision to closure) in minutes (per procedure as applicable) 60    Note:  Surgical Case Time Needed (in minutes)   Surgery Date: 39-40 weeks    Patient Class (for admit prior to surgery, specify number of days in comments): Surgery admit admit day of surgery   Length of Stay: 4 days     needed? No    Post-Op Appointment 6 weeks    Vendor Needed? No

## 2019-01-16 NOTE — TELEPHONE ENCOUNTER
Surgery Scheduled.    Date of Surgery 19 Time of Surgery 7:30 am  Procedure: Primary C/Section  Hospital/Surgical Facility: [facility]  Surgeon: Dr. Leal  Type of Anesthesia Anticipated: Spinal  Pre-op: To be done with Dr. Leal at [facility]  6 week post op 3/13/19 with Dr. Leal at  [facility]  Pre-certification 19  Consent signed: NA  Hospital Stay 3 days       [facility] surgery packet mailed to patient's home address.  Patient instructed NPO 12 hours prior to surgery, arrive 1 3/4 hours prior to surgery, must not have a .  Patient understood and agrees to the plan.      Surgery Pre-Certification    Medical Record Number: [id number]  Ebony Jo  YOB: 1985   Phone: [phone] (home)   Primary Provider: Desire Dumont    Reason for Admit:  Breech Presentation    Surgeon: Fredy Leal MD  Surgical Procedure: Primary   ICD-9 Coded: O32.1XX0  Date of Surgery: 19  Consent signed? N/A     Hospital: [facility]  Inpatient- Length of stay:  3 days.    Requestor:  Joceline Stubbs     Location:  [facility]    Ariana Kline CMA

## 2019-01-23 NOTE — PATIENT INSTRUCTIONS
If you have any questions regarding your visit, Please contact your care team.     [facility] [facility]: [phone]    [facility] CLINIC HOURS TELEPHONE NUMBER       Fredy Leal M.D.      Ariana-    Eugene Campbell-Medical Assistant       Monday-[place]  8:00a.m-4:45 p.m  Tuesday-[place]  9:00a.m-11:45 a.m  Wednesday-[place] 8:00a.m-4:45 p.m.  Thursday-[place]  8:00a.m-4:45 p.m.  Friday-[place]  8:00a.m-4:45 p.m. [facility]  [address] [place] [address]  [phone] ask for [facility]  [phone] Fax  Imaging [phone]    [facility] [place] [facility]  [address] [place] [address] [place] [address]  [phone]    [facility]  [address] [place] [address]  [phone] ask [facility]  [phone] Fax  Imaging [phone]     Urgent Care locations:    [facility] Monday-Friday  5 pm - 9 pm  Saturday and Sunday   9 am - 5 pm    Monday-Friday   11 am - 9 pm  Saturday and Sunday   9 am - 5 pm   [phone]       If you need a medication refill, please contact your pharmacy. Please allow 3 business days for your refill to be completed.  As always, Thank you for trusting us with your healthcare needs!

## 2019-01-24 ENCOUNTER — PRENATAL OFFICE VISIT (OUTPATIENT)
Dept: OBGYN | Facility: CLINIC | Age: 34
End: 2019-01-24
Payer: COMMERCIAL

## 2019-01-24 VITALS
SYSTOLIC BLOOD PRESSURE: 124 MMHG | HEIGHT: 65 IN | OXYGEN SATURATION: 100 % | TEMPERATURE: 98.3 F | HEART RATE: 65 BPM | BODY MASS INDEX: 34.49 KG/M2 | DIASTOLIC BLOOD PRESSURE: 73 MMHG | WEIGHT: 207 LBS

## 2019-01-24 DIAGNOSIS — Z34.80 SUPERVISION OF OTHER NORMAL PREGNANCY, ANTEPARTUM: ICD-10-CM

## 2019-01-24 PROCEDURE — 99207 ZZC PRENATAL VISIT: CPT | Performed by: OBSTETRICS & GYNECOLOGY

## 2019-01-24 ASSESSMENT — MIFFLIN-ST. JEOR: SCORE: 1630.86

## 2019-01-26 NOTE — PROGRESS NOTES
No signif signs, symptoms or concerns except some emotional lability. URI resolved.   The patient denies allergies, anesthesia problems, bleeding tendencies, corticosteroids, diabetes, thromboembolic phenonmenon, rheumatic fever, glaucoma, heart murmur, hepatitis, herbal supplements, recent illnesses, implanted devices, body jewelry, Latex sensitivity, transfusions, and tobacco use except for the following: none.  Exam: Head, Neck, Airway, Heart, and Lungs: neg/normal except class 3 airway.  Preop instructions given.  Advice appropriate to gestational age reviewed including pertinent Checklist items. Discussed condition, tests and care plan including RBA. Problem list updated.   A/P:  Ebony was seen today for prenatal care and pre-op exam.    Diagnoses and all orders for this visit:    Breech presentation, single or unspecified fetus    Supervision of other normal pregnancy, antepartum        Fredy Leal MD

## 2019-01-28 NOTE — TELEPHONE ENCOUNTER
NO PA required for C section [address] REF#[id number]  Inpatient notification required by [facility]

## 2019-01-30 ENCOUNTER — MYC MEDICAL ADVICE (OUTPATIENT)
Dept: OBGYN | Facility: CLINIC | Age: 34
End: 2019-01-30

## 2019-01-30 NOTE — TELEPHONE ENCOUNTER
Phone call placed to pt. Pt c/o 3 ctx's since 5:30 am.  Denies LOF and bleeding.  Pt encouraged to eat breakfast and be sure to stay hydrated  ounces of water a day. Comfort measures encouraged, warm bath or shower, rocking on a birthing ball, or lying on left side.  Advised to call back if LOF, bleeding, if ctx increase in frequency and intensity. Pt verbalized understanding. Florence Fiore RN on 1/30/2019 at 8:45 AM

## 2019-02-01 ENCOUNTER — TELEPHONE (OUTPATIENT)
Dept: OBGYN | Facility: CLINIC | Age: 34
End: 2019-02-01

## 2019-02-01 NOTE — TELEPHONE ENCOUNTER
Pt states she is experiencing a lot of back and abdomen pain.  She states this started yesterday and it is constant.  She rates this pain and a 7/10.  She states walking and moving around increases the pain.  Pt denies contractions or pelvic pain/pressure.  Pt is unsure if she has a fever.  Advised pt to be evaluated at [facility] due to 39w5d gestation and experiencing moderate-severe pain in back and abdomen that is constant since yesterday.  Advised pt to call [facility] before she goes there to let them know she is coming.  Pt verbalized understanding and agreed to plan.    Kassandra Back RN

## 2019-02-03 ENCOUNTER — NURSE TRIAGE (OUTPATIENT)
Dept: NURSING | Facility: CLINIC | Age: 34
End: 2019-02-03

## 2019-02-03 NOTE — TELEPHONE ENCOUNTER
"Patient reports contractions started at about 11 am this morning. Patient reports this is her first child and is she is 40 wks pregnant today.  Difficult to understanding patient.  FNA advised patient to go to L&D if her contractions has been six minutes apart or less for 2 hours.  Reviewed guideline and care advice with caller.  Caller verbalizes understanding.            Reason for Disposition    [1] First baby (primipara) AND [2] contractions < 6 minutes apart  AND [3] present 2 hours    Additional Information    Negative: Passed out (i.e., lost consciousness, collapsed and was not responding)    Negative: Shock suspected (e.g., cold/pale/clammy skin, too weak to stand, low BP, rapid pulse)    Negative: Difficult to awaken or acting confused  (e.g., disoriented, slurred speech)    Negative: [1] SEVERE abdominal pain (e.g., excruciating) AND [2] constant AND [3] present > 1 hour    Negative: Severe bleeding (e.g., continuous red blood from vagina, or large blood clots)    Negative: Umbilical cord hanging out of the vagina (shiny, white, curled appearance, \"like telephone cord\")    Negative: Uncontrollable urge to push (i.e., feels like baby is coming out now)    Negative: Can see baby    Negative: Sounds like a life-threatening emergency to the triager    Negative: Pregnant < 37 weeks (i.e., )    Negative: [1] Uncertain delivery date AND [2] possibly pregnant < 37 weeks (i.e., )    Protocols used: PREGNANCY - LABOR-ADULT-      "

## 2019-02-04 ENCOUNTER — PRENATAL OFFICE VISIT (OUTPATIENT)
Dept: OBGYN | Facility: CLINIC | Age: 34
End: 2019-02-04
Payer: COMMERCIAL

## 2019-02-04 VITALS
DIASTOLIC BLOOD PRESSURE: 74 MMHG | HEART RATE: 69 BPM | WEIGHT: 213.4 LBS | SYSTOLIC BLOOD PRESSURE: 112 MMHG | BODY MASS INDEX: 35.79 KG/M2

## 2019-02-04 DIAGNOSIS — Z34.80 SUPERVISION OF OTHER NORMAL PREGNANCY, ANTEPARTUM: ICD-10-CM

## 2019-02-04 PROCEDURE — 99207 ZZC PRENATAL VISIT: CPT | Performed by: OBSTETRICS & GYNECOLOGY

## 2019-02-04 NOTE — PATIENT INSTRUCTIONS
If you have any questions regarding your visit, Please contact your care team.     [facility]: [phone]    [facility] CLINIC HOURS TELEPHONE NUMBER       Fredy Leal M.D.      Ariana-      Eugene Campbell-Medical Assistant       Monday-[place]  8:00a.m-4:45 p.m  Tuesday-[place]  9:00a.m-11:45 a.m  Wednesday-[place] 8:00a.m-4:45 p.m.  Thursday-[place]  8:00a.m-4:45 p.m.  Friday-[place]  8:00a.m-4:45 p.m. [facility]  [address] [place] [address]  [phone] ask for [facility]  [phone] Fax  Imaging [phone]    [facility] Labor and Delivery  [address] [place] [address] [place] [address]  [phone]    [facility]  [address] [place] [address]  [phone] ask [facility]  [phone] Fax  Imaging [phone]     Urgent Care locations:    [facility] Monday-Friday  5 pm - 9 pm  Saturday and Sunday   9 am - 5 pm    Monday-Friday   11 am - 9 pm  Saturday and Sunday   9 am - 5 pm   [phone]       If you need a medication refill, please contact your pharmacy. Please allow 3 business days for your refill to be completed.  As always, Thank you for trusting us with your healthcare needs!

## 2019-02-04 NOTE — PROGRESS NOTES
No signif signs, symptoms or concerns except interval conversion to cephalic presentation and  section cancelled. Had two Labor and Delivery evaluations since then for false labor. Desires induction and this is reasonable and arranged. Advice appropriate to gestational age reviewed including pertinent Checklist items. Discussed condition, tests and care plan including RBA. Problem list updated.   A/P:  Ebony was seen today for prenatal care.    Diagnoses and all orders for this visit:    Supervision of other normal pregnancy, antepartum        Fredy Leal MD

## 2019-02-05 ENCOUNTER — NURSE TRIAGE (OUTPATIENT)
Dept: NURSING | Facility: CLINIC | Age: 34
End: 2019-02-05

## 2019-02-05 NOTE — TELEPHONE ENCOUNTER
"  Patient was supposed to be induced this morning.  Henna Lane RN  [facility]    Reason for Disposition    Leakage of fluid from vagina    Additional Information    Negative: [1] SEVERE abdominal pain (e.g., excruciating) AND [2] constant AND [3] present > 1 hour    Negative: Severe bleeding (e.g., continuous red blood from vagina, or large blood clots)    Negative: Umbilical cord hanging out of the vagina (shiny, white, curled appearance, \"like telephone cord\")    Negative: Uncontrollable urge to push (i.e., feels like baby is coming out now)    Negative: Can see baby    Negative: Sounds like a life-threatening emergency to the triager    Negative: < 20 weeks pregnant    Negative: Vaginal bleeding    Answer Assessment - Initial Assessment Questions  1. ONSET: \"When did the symptoms begin?\"         This morning  2. CONTRACTIONS: \"Are you having any contractions?\" If yes, ask: \"Describe the contractions that you are having.\" (e.g., duration, frequency, regularity, severity)      no  3. MARIANN: \"What date are you expecting to deliver?\"      40 weeks and 2 days  4. PARITY: \"Have you had a baby before?\" If yes, \"How long did the labor last?\"      This is 4th  5. FETAL MOVEMENT: \"Has the baby's movement decreased or changed significantly from normal?\"      ok  6. OTHER SYMPTOMS: \"Do you have any other symptoms?\" (e.g., abdominal pain, vaginal bleeding, fever, hand/facial swelling)      no    Protocols used: PREGNANCY - RUPTURE OF MEMBRANES-ADULT-AH      "

## 2019-02-26 ENCOUNTER — TELEPHONE (OUTPATIENT)
Dept: OBGYN | Facility: CLINIC | Age: 34
End: 2019-02-26

## 2019-02-27 NOTE — TELEPHONE ENCOUNTER
Forms also sent to Krista Coe. Forms completed and faxed. Copy sent to abstracting for scanning.    Shannan Pagan CMA

## 2019-03-20 ENCOUNTER — PRENATAL OFFICE VISIT (OUTPATIENT)
Dept: OBGYN | Facility: CLINIC | Age: 34
End: 2019-03-20
Payer: COMMERCIAL

## 2019-03-20 VITALS
WEIGHT: 185 LBS | BODY MASS INDEX: 31.02 KG/M2 | DIASTOLIC BLOOD PRESSURE: 74 MMHG | HEART RATE: 80 BPM | TEMPERATURE: 98.2 F | SYSTOLIC BLOOD PRESSURE: 128 MMHG

## 2019-03-20 DIAGNOSIS — Z30.013 EVALUATION FOR CONTRACEPTIVE INJECTION: ICD-10-CM

## 2019-03-20 LAB — HGB BLD-MCNC: 12 G/DL (ref 11.7–15.7)

## 2019-03-20 PROCEDURE — 00000218 ZZHCL STATISTIC OBHBG - HEMOGLOBIN: Performed by: OBSTETRICS & GYNECOLOGY

## 2019-03-20 PROCEDURE — 36415 COLL VENOUS BLD VENIPUNCTURE: CPT | Performed by: OBSTETRICS & GYNECOLOGY

## 2019-03-20 PROCEDURE — 87624 HPV HI-RISK TYP POOLED RSLT: CPT | Performed by: OBSTETRICS & GYNECOLOGY

## 2019-03-20 PROCEDURE — G0145 SCR C/V CYTO,THINLAYER,RESCR: HCPCS | Performed by: OBSTETRICS & GYNECOLOGY

## 2019-03-20 PROCEDURE — 99207 ZZC POST PARTUM EXAM: CPT | Performed by: OBSTETRICS & GYNECOLOGY

## 2019-03-20 PROCEDURE — 96372 THER/PROPH/DIAG INJ SC/IM: CPT | Performed by: OBSTETRICS & GYNECOLOGY

## 2019-03-20 RX ORDER — MEDROXYPROGESTERONE ACETATE 150 MG/ML
150 INJECTION, SUSPENSION INTRAMUSCULAR
Status: DISCONTINUED | OUTPATIENT
Start: 2019-03-20 | End: 2020-10-22

## 2019-03-20 RX ADMIN — MEDROXYPROGESTERONE ACETATE 150 MG: 150 INJECTION, SUSPENSION INTRAMUSCULAR at 15:29

## 2019-03-20 ASSESSMENT — ANXIETY QUESTIONNAIRES
1. FEELING NERVOUS, ANXIOUS, OR ON EDGE: NOT AT ALL
2. NOT BEING ABLE TO STOP OR CONTROL WORRYING: NOT AT ALL
7. FEELING AFRAID AS IF SOMETHING AWFUL MIGHT HAPPEN: NOT AT ALL
IF YOU CHECKED OFF ANY PROBLEMS ON THIS QUESTIONNAIRE, HOW DIFFICULT HAVE THESE PROBLEMS MADE IT FOR YOU TO DO YOUR WORK, TAKE CARE OF THINGS AT HOME, OR GET ALONG WITH OTHER PEOPLE: NOT DIFFICULT AT ALL
5. BEING SO RESTLESS THAT IT IS HARD TO SIT STILL: NOT AT ALL
6. BECOMING EASILY ANNOYED OR IRRITABLE: NOT AT ALL
3. WORRYING TOO MUCH ABOUT DIFFERENT THINGS: NOT AT ALL
GAD7 TOTAL SCORE: 0

## 2019-03-20 ASSESSMENT — PATIENT HEALTH QUESTIONNAIRE - PHQ9
5. POOR APPETITE OR OVEREATING: NOT AT ALL
SUM OF ALL RESPONSES TO PHQ QUESTIONS 1-9: 1

## 2019-03-20 NOTE — PROGRESS NOTES
Prior to injection, verified patient identity using patient's name and date of birth.  Due to injection administration, patient instructed to remain in clinic for 15 minutes  afterwards, and to report any adverse reaction to me immediately.    BP: 128/74    LAST PAP/EXAM:   Lab Results   Component Value Date    PAP NIL 01/27/2016     URINE HCG:not indicated    NEXT INJECTION DUE: 6/5/19 - 6/19/19       Drug Amount Wasted:  None.  Vial/Syringe: Single dose vial  Expiration Date:  02/20    Shannan Pagan CMA

## 2019-03-20 NOTE — PATIENT INSTRUCTIONS
If you have any questions regarding your visit, Please contact your care team.     [employer]: [phone]  [facility] CLINIC HOURS TELEPHONE NUMBER       Fredy Leal M.D.        Ariana-    RN-      Omni-Medical Assistant       Monday-[place]  8:00a.m-4:45 p.m  Tuesday-[place]  9:00a.m-4:00 p.m  Wednesday-[place] 8:00a.m-4:45 p.m.  Thursday-[place]  8:00a.m-4:45 p.m.  Friday-[place]  8:00a.m-4:45 p.m. [facility]  [address]  [place], [place] [address] [phone] ask [facility]  [phone] Fax  Imaging Gaepanubnr-[phone]    [facility]  [address] [place] [address] [address]  [place], [place] [address] [phone]    [facility]  [address] [place] [address]  [phone] ask [facility]  [phone] Fax  Imaging Okg[place]wdfil-[phone]     Urgent Care locations:    [place]        [place] Monday-Friday  5 pm - 9 pm  Saturday and Sunday   9 am - 5 pm  Monday-Friday   11 am - 9 pm  Saturday and Sunday   9 am - 5 pm   [phone]   If you need a medication refill, please contact your pharmacy. Please allow 3 business days for your refill to be completed.  As always, Thank you for trusting us with your healthcare needs!

## 2019-03-21 ASSESSMENT — ANXIETY QUESTIONNAIRES: GAD7 TOTAL SCORE: 0

## 2019-03-22 PROBLEM — Z34.80 SUPERVISION OF OTHER NORMAL PREGNANCY, ANTEPARTUM: Status: RESOLVED | Noted: 2018-06-27 | Resolved: 2019-03-22

## 2019-03-22 PROBLEM — O09.529 ANTEPARTUM MULTIGRAVIDA OF ADVANCED MATERNAL AGE: Status: RESOLVED | Noted: 2018-07-19 | Resolved: 2019-03-22

## 2019-03-23 NOTE — PROGRESS NOTES
Ebony Jo is a 33 year old year old G 4 P 1 who is here today for a postpartum exam.    HPI:      Doing well and without signif sx or concerns. Currently breast and bottle feeding infant. Here today alone. Infant doing well. Contraceptive method planned is DepoP- method, RBA reviewed. NSA since delivery. PP depression screening as noted. See PHQ-9. Score = 1.    Past medical, obstetrical, surgical, family and social history reviewed and as noted or updated in chart.     Allergies, meds and supplements are as noted or updated in chart.      ROS:     Systems reviewed include constitutional; breast;                 cardiac; respiratory; gastrointestinal; genitourinary;                                musculoskeletal; integumentary; psychological;                                hematologic/lymphatic and endocrine.                  These systems were negative for significant symptoms except                 for the following: none and see HPI.                                Exam:  VS as noted.                    Abd and Pelvis were                             normal or negative except for, or in particular noting, the following                pertinent findings: Wd A, long narrow speculum used.      Assessment: Postpartum exam    Plan and Recommendations: Symptoms, problems and concerns reviewed.  Discussed pregnancy, birth, future pregnancy plans, work plans and infant care issues.  Problem list updated and Pregnancy Episode closed. See orders. RTC in 12 months.  Medications and prescriptions given as noted.  I reviewed side effects, risks, benefits and instructions on proper use.  DepoP started.     Ebony was seen today for post partum exam.    Diagnoses and all orders for this visit:    Routine postpartum follow-up  -     OB HEMOGLOBIN  -     Pap imaged thin layer screen with HPV - recommended age 30 - 65 years (select HPV order below)  -     HPV High Risk Types DNA Cervical    Evaluation for contraceptive  injection  -     medroxyPROGESTERone (DEPO-PROVERA) injection 150 mg        Fredy Leal MD

## 2019-03-25 LAB
COPATH REPORT: NORMAL
PAP: NORMAL

## 2019-03-26 LAB
FINAL DIAGNOSIS: NORMAL
HPV HR 12 DNA CVX QL NAA+PROBE: NEGATIVE
HPV16 DNA SPEC QL NAA+PROBE: NEGATIVE
HPV18 DNA SPEC QL NAA+PROBE: NEGATIVE
SPECIMEN DESCRIPTION: NORMAL
SPECIMEN SOURCE CVX/VAG CYTO: NORMAL

## 2019-04-10 ENCOUNTER — OFFICE VISIT (OUTPATIENT)
Dept: FAMILY MEDICINE | Facility: CLINIC | Age: 34
End: 2019-04-10
Payer: COMMERCIAL

## 2019-04-10 VITALS
HEART RATE: 78 BPM | HEIGHT: 65 IN | OXYGEN SATURATION: 98 % | DIASTOLIC BLOOD PRESSURE: 78 MMHG | BODY MASS INDEX: 30.66 KG/M2 | WEIGHT: 184 LBS | SYSTOLIC BLOOD PRESSURE: 122 MMHG | TEMPERATURE: 98.3 F

## 2019-04-10 DIAGNOSIS — Z00.00 ROUTINE GENERAL MEDICAL EXAMINATION AT A HEALTH CARE FACILITY: Primary | ICD-10-CM

## 2019-04-10 PROCEDURE — 99395 PREV VISIT EST AGE 18-39: CPT | Performed by: FAMILY MEDICINE

## 2019-04-10 ASSESSMENT — MIFFLIN-ST. JEOR: SCORE: 1526.53

## 2019-04-10 ASSESSMENT — PAIN SCALES - GENERAL: PAINLEVEL: NO PAIN (0)

## 2019-04-10 NOTE — PROGRESS NOTES
SUBJECTIVE:   CC: Ebony Jo is an 33 year old woman who presents for preventive health visit.     Healthy Habits:    Do you get at least three servings of calcium containing foods daily (dairy, green leafy vegetables, etc.)? yes    Amount of exercise or daily activities, outside of work: No    Problems taking medications regularly No    Medication side effects: No    Have you had an eye exam in the past two years? no    Do you see a dentist twice per year? no    Do you have sleep apnea, excessive snoring or daytime drowsiness?no      Some relationship issues since before pregnancy and wondering about divorce.  Not down or depressed.    Today's PHQ-2 Score:   PHQ-2 (  [employer]) 3/17/2019 2018   Q1: Little interest or pleasure in doing things 0 0   Q2: Feeling down, depressed or hopeless 0 0   PHQ-2 Score 0 0   Q1: Little interest or pleasure in doing things Not at all -   Q2: Feeling down, depressed or hopeless Not at all -   PHQ-2 Score 0 -       Abuse: Current or Past(Physical, Sexual or Emotional)- No  Do you feel safe in your environment? Yes    Social History     Tobacco Use     Smoking status: Never Smoker     Smokeless tobacco: Never Used   Substance Use Topics     Alcohol use: No     Alcohol/week: 0.0 oz     Comment:  0-2  wine  glasses monthly, not in PG     If you drink alcohol do you typically have >3 drinks per day or >7 drinks per week? No                     Reviewed orders with patient.  Reviewed health maintenance and updated orders accordingly - Yes  Patient Active Problem List   Diagnosis     CARDIOVASCULAR SCREENING; LDL GOAL LESS THAN 160     Migraine without aura     Hemifacial spasm, right     Acne vulgaris     Past Surgical History:   Procedure Laterality Date     APPENDECTOMY  1997      SECTION         Social History     Tobacco Use     Smoking status: Never Smoker     Smokeless tobacco: Never Used   Substance Use Topics     Alcohol use: No     Alcohol/week: 0.0 oz      "Comment:  0-2  wine  glasses monthly, not in PG     Family History   Problem Relation Age of Onset     Diabetes Mother      Alzheimer Disease Father      Diabetes Brother            Mammogram not appropriate for this patient based on age.    Pertinent mammograms are reviewed under the imaging tab.  History of abnormal Pap smear: NO - age 30-65 PAP every 5 years with negative HPV co-testing recommended  PAP / HPV Latest Ref Rng & Units 3/20/2019 1/27/2016 6/7/2012   PAP - NIL NIL NIL   HPV 16 DNA NEG:Negative Negative - -   HPV 18 DNA NEG:Negative Negative - -   OTHER HR HPV NEG:Negative Negative - -     Reviewed and updated as needed this visit by clinical staff  Tobacco  Allergies  Meds  Med Hx  Surg Hx  Fam Hx  Soc Hx        Reviewed and updated as needed this visit by Provider  Tobacco  Allergies  Meds  Problems  Med Hx  Surg Hx  Fam Hx            ROS:  CONSTITUTIONAL: NEGATIVE for fever, chills, change in weight  INTEGUMENTARU/SKIN: NEGATIVE for worrisome rashes, moles or lesions  EYES: NEGATIVE for vision changes or irritation  ENT: NEGATIVE for ear, mouth and throat problems  RESP: NEGATIVE for significant cough or SOB  BREAST: NEGATIVE for masses, tenderness or discharge  CV: NEGATIVE for chest pain, palpitations or peripheral edema  GI: NEGATIVE for nausea, abdominal pain, heartburn, or change in bowel habits  : NEGATIVE for unusual urinary or vaginal symptoms. Periods are regular.  MUSCULOSKELETAL: NEGATIVE for significant arthralgias or myalgia  NEURO: NEGATIVE for weakness, dizziness or paresthesias  PSYCHIATRIC: NEGATIVE for changes in mood or affect    OBJECTIVE:   /78 (BP Location: Left arm, Patient Position: Chair, Cuff Size: Adult Regular)   Pulse 78   Temp 98.3  F (36.8  C) (Tympanic)   Ht 1.645 m (5' 4.75\")   Wt 83.5 kg (184 lb)   LMP 03/17/2019 (Exact Date)   SpO2 98%   Breastfeeding? No   BMI 30.86 kg/m    EXAM:  GENERAL: healthy, alert and no distress  EYES: Eyes " "grossly normal to inspection, PERRL and conjunctivae and sclerae normal  HENT: ear canals and TM's normal, nose and mouth without ulcers or lesions  NECK: no adenopathy, no asymmetry, masses, or scars and thyroid normal to palpation  RESP: lungs clear to auscultation - no rales, rhonchi or wheezes  CV: regular rate and rhythm, normal S1 S2, no S3 or S4, no murmur, click or rub, no peripheral edema and peripheral pulses strong  ABDOMEN: soft, nontender, no hepatosplenomegaly, no masses and bowel sounds normal  MS: no gross musculoskeletal defects noted, no edema  SKIN: no suspicious lesions or rashes  NEURO: Normal strength and tone, mentation intact and speech normal  PSYCH: mentation appears normal, affect normal/bright    Diagnostic Test Results:  none     ASSESSMENT/PLAN:   1. Routine general medical examination at a health care facility  Routine preventive discussed. Discussed relationship boundaries and helped her problem-solve next steps.      COUNSELING:   Reviewed preventive health counseling, as reflected in patient instructions    BP Readings from Last 1 Encounters:   04/10/19 122/78     Estimated body mass index is 30.86 kg/m  as calculated from the following:    Height as of this encounter: 1.645 m (5' 4.75\").    Weight as of this encounter: 83.5 kg (184 lb).      Weight management plan: Discussed healthy diet and exercise guidelines     reports that she has never smoked. She has never used smokeless tobacco.      Counseling Resources:  ATP IV Guidelines  Pooled Cohorts Equation Calculator  Breast Cancer Risk Calculator  FRAX Risk Assessment  ICSI Preventive Guidelines  Dietary Guidelines for Americans, 2010  USDA's MyPlate  ASA Prophylaxis  Lung CA Screening    Desire Waldron MD  [facility]  "

## 2019-06-13 ENCOUNTER — ALLIED HEALTH/NURSE VISIT (OUTPATIENT)
Dept: NURSING | Facility: CLINIC | Age: 34
End: 2019-06-13
Payer: COMMERCIAL

## 2019-06-13 VITALS — SYSTOLIC BLOOD PRESSURE: 117 MMHG | DIASTOLIC BLOOD PRESSURE: 61 MMHG

## 2019-06-13 DIAGNOSIS — Z30.42 ENCOUNTER FOR DEPO-PROVERA CONTRACEPTION: Primary | ICD-10-CM

## 2019-06-13 PROCEDURE — 96372 THER/PROPH/DIAG INJ SC/IM: CPT

## 2019-06-13 RX ADMIN — MEDROXYPROGESTERONE ACETATE 150 MG: 150 INJECTION, SUSPENSION INTRAMUSCULAR at 13:40

## 2019-06-13 NOTE — PROGRESS NOTES
BP: 117/61    LAST PAP/EXAM:   Lab Results   Component Value Date    PAP NIL 03/20/2019     URINE HCG:not indicated    The following medication was given:     MEDICATION: Depo Provera 150mg  ROUTE: IM  SITE: Deltoid - Left  : [employer]  LOT #: [id number]  EXP:02/2020  NEXT INJECTION DUE: 8/29/19 - 9/12/19   Provider: Tasia Waldron

## 2019-08-14 ENCOUNTER — MYC MEDICAL ADVICE (OUTPATIENT)
Dept: FAMILY MEDICINE | Facility: CLINIC | Age: 34
End: 2019-08-14

## 2019-09-05 ENCOUNTER — ALLIED HEALTH/NURSE VISIT (OUTPATIENT)
Dept: NURSING | Facility: CLINIC | Age: 34
End: 2019-09-05
Payer: COMMERCIAL

## 2019-09-05 VITALS — DIASTOLIC BLOOD PRESSURE: 66 MMHG | SYSTOLIC BLOOD PRESSURE: 112 MMHG

## 2019-09-05 DIAGNOSIS — Z30.42 SURVEILLANCE OF CONTRACEPTIVE INJECTION: Primary | ICD-10-CM

## 2019-09-05 PROCEDURE — 96372 THER/PROPH/DIAG INJ SC/IM: CPT

## 2019-09-05 RX ADMIN — MEDROXYPROGESTERONE ACETATE 150 MG: 150 INJECTION, SUSPENSION INTRAMUSCULAR at 10:20

## 2019-09-05 NOTE — NURSING NOTE
Clinic Administered Medication Documentation    MEDICATION LIST:   Depo Provera Documentation    Prior to injection, verified patient identity using patient's name and date of birth. Medication was administered. Please see MAR and medication order for additional information. Patient instructed to report any adverse reaction to staff immediately .    BP: 112/66    LAST PAP/EXAM:   Lab Results   Component Value Date    PAP NIL 03/20/2019     URINE HCG:not indicated    NEXT INJECTION DUE: 11/21/19 - 12/5/19    Injection time: 10:20am  Site/Route: IM Right deltoid  Was entire vial of medication used? Yes  Vial/Syringe: Single dose vial  Expiration Date:  1/2021    Rodger Mahan CMA

## 2019-09-29 ENCOUNTER — OFFICE VISIT (OUTPATIENT)
Dept: URGENT CARE | Facility: URGENT CARE | Age: 34
End: 2019-09-29
Payer: COMMERCIAL

## 2019-09-29 VITALS
BODY MASS INDEX: 31.36 KG/M2 | OXYGEN SATURATION: 100 % | WEIGHT: 187 LBS | HEART RATE: 57 BPM | RESPIRATION RATE: 20 BRPM | TEMPERATURE: 98.8 F | SYSTOLIC BLOOD PRESSURE: 116 MMHG | DIASTOLIC BLOOD PRESSURE: 75 MMHG

## 2019-09-29 DIAGNOSIS — M54.42 ACUTE BILATERAL LOW BACK PAIN WITH LEFT-SIDED SCIATICA: Primary | ICD-10-CM

## 2019-09-29 PROCEDURE — 99214 OFFICE O/P EST MOD 30 MIN: CPT | Performed by: NURSE PRACTITIONER

## 2019-09-29 RX ORDER — NAPROXEN 500 MG/1
500 TABLET ORAL 2 TIMES DAILY PRN
Qty: 30 TABLET | Refills: 0 | Status: SHIPPED | OUTPATIENT
Start: 2019-09-29 | End: 2020-10-22

## 2019-09-29 ASSESSMENT — ENCOUNTER SYMPTOMS
BACK PAIN: 1
NUMBNESS: 0
WEAKNESS: 0

## 2019-09-29 NOTE — PROGRESS NOTES
SUBJECTIVE:   Ebony Jo is a 34 year old female presenting with a chief complaint of   Chief Complaint   Patient presents with     Back Pain     Lower back pain, pateint had a  this past February and feels that it hurts intermittently since then, started hurting last night but today has been worse, hard to stand, has a watch how she sits, does not recall injuring it in any way       She is an established patient of [facility].    Back Pain    Onset of symptoms was 4 months ago.  Location: bilateral low back  Radiation: radiates into the right leg  Context:       Unsure of specific injury, however recent  with epidural and noticed increased discomfort      Patient experienced delayed pain  Course of symptoms is waxing and waning.    Severity 7-8/10 on the numeric pain scale  Current and Associated symptoms: pain  Denies: fecal incontinence, urinary incontinence, lower extremity numbness, lower extremity weakness and paresthesia    Aggravating Factors: standing, bending and squatting. Works as a Nursing assistance  Therapies to improve symptoms include: ibuprofen 600mg one dose only   Past history: no prior back problems  LMP: amenorrhea, on Depo injection     Review of Systems   Musculoskeletal: Positive for back pain.   Skin: Negative.    Neurological: Negative for weakness and numbness.   All other systems reviewed and are negative.      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 (Patient not taking: Reported on 9/29/2019) 60 mL 11     Prenatal Vit-Fe Fumarate-FA (PRENATAL MULTIVITAMIN PLUS IRON) 27-0.8 MG TABS per tablet Take 1 tablet by mouth daily (Patient not taking: Reported on 9/29/2019) 100 tablet 3     Social History     Tobacco Use     Smoking status: Never Smoker     Smokeless tobacco: Never Used   Substance Use Topics     Alcohol use: No     Alcohol/week: 0.0 standard drinks     Comment:  0-2  wine  glasses monthly, not in PG       OBJECTIVE  /75 (BP Location: Left arm, Patient Position: Chair, Cuff Size: Adult Regular)   Pulse 57   Temp 98.8  F (37.1  C) (Oral)   Resp 20   Wt 84.8 kg (187 lb)   SpO2 100%   Breastfeeding? No   BMI 31.36 kg/m      Physical Exam  Constitutional:       Appearance: Normal appearance. She is not ill-appearing.   Cardiovascular:      Rate and Rhythm: Normal rate and regular rhythm.      Pulses: Normal pulses.      Heart sounds: Normal heart sounds. No murmur. No friction rub. No gallop.    Pulmonary:      Effort: Pulmonary effort is normal. No respiratory distress.      Breath sounds: Normal breath sounds. No stridor. No wheezing, rhonchi or rales.   Abdominal:      General: Bowel sounds are normal. There is no distension.      Palpations: Abdomen is soft. There is no mass.      Tenderness: There is no tenderness. There is no right CVA tenderness, left CVA tenderness, guarding or rebound.      Hernia: No hernia is present.   Musculoskeletal: Normal range of motion.         General: Tenderness present. No swelling, deformity or signs of injury.      Right lower leg: No edema.      Left lower leg: No edema.      Comments: LEFT sided point tenderness at mid lumbar region and SI joint with palpation. No step offs noted.    Skin:     General: Skin is warm.      Capillary Refill: Capillary refill takes less than 2 seconds.      Findings: No rash.   Neurological:      General: No focal deficit present.       "Mental Status: She is alert and oriented to person, place, and time. Mental status is at baseline.      Cranial Nerves: No cranial nerve deficit.      Sensory: No sensory deficit.      Motor: No weakness.      Coordination: Coordination normal.      Gait: Gait normal.      Deep Tendon Reflexes: Reflexes normal.   Psychiatric:         Mood and Affect: Mood normal.         Behavior: Behavior normal.         Labs:  No results found for this or any previous visit (from the past 24 hour(s)).    ASSESSMENT:    ICD-10-CM    1. Acute bilateral low back pain with left-sided sciatica M54.42 naproxen (NAPROSYN) 500 MG tablet        Medical Decision Making:    Differential Diagnosis:  Back Pain: lumbosacral strain and acute sciatica    Serious Comorbid Conditions:  Adult:  None    PLAN: Discussed with patient overall causes and treatments of sciatica. Advised during acute phase conservative cares and RICE; Naprosyn 500mg, topical analgesics ointments daily stretches. Advised heat/ice application in 20 minute intervals as needed. Rationale for no further imaging at this time however consider PT referral if not improving. Patient agreed to the plan of care with no further questions or concerns.     Raeann Morales, APRN, CNP      Patient Instructions       Patient Education       * Sciatica    Sciatica (\"Lumbar Radiculopathy\") causes a pain that spreads from the lower back down into the buttock, hip and leg. Sometimes leg pain can occur without any back pain. Sciatica is due to irritation or pressure on a spinal nerve as it comes out of the spinal canal. This is most often due to pressure on the nerve from a nearby spinal disk (the cartilage cushion between each spinal bone). Other causes include spinal stenosis (narrowing of the spinal canal) and spasm of the piriformis muscle (a muscle in the buttocks that the sciatic nerve passes through).  Sciatica may begin after a sudden twisting/bending force (such as in a car accident), " or sometimes after a simple awkward movement. In either case, muscle spasm is commonly present and can add to the pain.  The diagnosis of sciatica is made from the symptoms and physical exam. Unless you had a physical injury (such as a car accident or fall), X-rays are usually not ordered for the initial evaluation of sciatica because the nerves and disks cannot be seen on an X-ray. Most sciatica (80-90%) gets better with time.  What can I do about my low back pain?  There are three main things you can do to ease low back pain and help it go away.    Stay active! Use positions, movements and exercises. Too much rest can make your symptoms worse.    Use heat or cold packs.    Take medicine as directed.  Using positions, movements and exercises  Research tells us that moving your joints and muscles can help you recover from back pain. Such activity should be simple and gentle.  Use walking to help relieve your discomfort. Try taking a short walk every 3 to 4 hours during the day. Walk for a few minutes inside your home or take longer walks outside, on a treadmill or at a mall. Slowly increase the amount of time you walk. Expect discomfort when you begin, but it should lessen as your back starts to recover.  Finding a position that is comfortable  When your back pain is new, you may find that certain positions will ease your pain. Gently try each of the following positions until you find one that eases your pain. Once you find a position of comfort, use it as often as you like while you recover. Return to your daily routine as soon as possible.     Lie on your back with your legs bent. You can do this by placing a pillow under your knees or lie on the floor and rest your lower legs on the seat of a chair.    Lie on your side with your knees bent and place a pillow between your knees.    Lie on your stomach over pillows.  Using heat or cold packs  Try cold packs or gentle heat to ease your pain. Use whichever gives you  the most relief. Apply the cold pack or heat for 15 minutes at a time, as often as needed.  Taking medicine  If taking over-the-counter medicine:    Take ibuprofen (Advil, Motrin) 600 mg. three times a day as needed for pain.  OR    Take Aleve (naproxen sodium) 220 to 440 mg. two times a day as needed for pain.  If your doctor prescribed medicine, follow the instructions. Stop taking the medicine as soon as you can.  When should I call my doctor?  Your back pain should improve over the first couple of weeks. As it improves, you should be able to return to your normal activities. But call your doctor if:    You have a sudden change in your ability to control? your bladder or bowels.    You begin to feel tingling in your groin or legs.    The pain spreads down your leg and into your foot.    Your toes, feet or leg muscles begin to feel weak.    You feel generally unwell or sick.    Your pain gets worse.    1069-1718 [employer]. [address] [place] [address]. All rights reserved. This information is not intended as a substitute for professional medical care. Always follow your healthcare professional's instructions.  This information has been modified by your health care provider with permission from the publisher.           Patient Education     Understanding Lumbar Radiculopathy    Lumbar radiculopathy is irritation or inflammation of a nerve root in the low back. It causes symptoms that spread out from the back down one or both legs. To understand this condition, it helps to understand the parts of the spine:    Vertebrae. These are bones that stack to form the spine. The lumbar spine contains the 5 bottom vertebrae.    Disks. These are soft pads of tissue between the vertebrae. They act as shock absorbers for the spine.    Spinal canal. This is a tunnel formed within the stacked vertebrae. In the lumbar spine, nerves run through this canal.    Nerves. These branch off and leave the spinal  canal, traveling out to parts of the body. As they leave the spinal canal, nerves pass through openings between the vertebrae. The nerve root is the part of the nerve that is closest to the spinal canal.    Sciatic nerve. This is a large nerve formed from several nerve roots in the low back. This nerve extends down the back of the leg to the foot.  With lumbar radiculopathy, nerve roots in the low back become irritated. This leads to pain and symptoms. The sciatic nerve is commonly involved, so the condition is often called sciatica.  What causes lumbar radiculopathy?  Aging, injury, poor posture, extra body weight, and other issues can lead to problems in the low back. These problems may then irritate nerve roots. They include:    Damage to a disk in the lumbar spine. The damaged disk may then press on nearby nerve roots.    Degeneration from wear and tear, and aging. This can lead to narrowing (stenosis) of the openings between the vertebrae. The narrowed openings press on nerve roots as they leave the spinal canal.    Unstable spine. This is when a vertebra slips forward. It can then press on a nerve root.  Other, less common things can put pressure on nerves in the low back. These include diabetes, infection, or a tumor.  Symptoms of lumbar radiculopathy  These include:    Pain in the low back    Pain, numbness, tingling, or weakness that travels into the buttocks, hip, groin, or leg    Muscle spasms  Treatment for lumbar radiculopathy  In most cases, your healthcare provider will first try treatments that help relieve symptoms. These may include:    Prescription and over-the-counter pain medicines. These help relieve pain, swelling, and irritation.    Limits on positions and activities that increase pain. But lying in bed or avoiding all movement is only recommended for a short period of time.    Physical therapy, including exercises and stretches. This helps decrease pain and increase movement and  function.    Steroid shots into the lower back. This may help relieve symptoms for a time.    Weight-loss program. If you are overweight, losing extra pounds may help relieve symptoms.  In some cases, you may need surgery to fix the underlying problem. This depends on the cause, the symptoms, and how long the pain has lasted.  Possible complications  Over time, an irritated and inflamed nerve may become damaged. This may lead to long-lasting (permanent) numbness or weakness in your legs and feet. If symptoms change suddenly or get worse, be sure to let your healthcare provider know.  When to call your healthcare provider  Call your healthcare provider right away if you have any of these:    New pain or pain that gets worse    New or increasing weakness, tingling, or numbness in your leg or foot    Problems controlling your bladder or bowel   Date Last Reviewed: 3/10/2016    2891-3688 [employer]. [address] [place] [address]. All rights reserved. This information is not intended as a substitute for professional medical care. Always follow your healthcare professional's instructions.           Patient Education     Lumbar Rotation    1. Lie on your back on the floor, with your knees bent and your feet flat on the floor. Don t press your neck or lower back to the floor.  2. Lean both of your knees to one side. Turn your head in the opposite direction. Keep your shoulders flat on the floor. Be gentle and don t push through pain.  3. Hold for 20 seconds. Then slowly move your knees and head in the other direction.  4. Repeat 2 to 5 times, or as instructed.   Date Last Reviewed: 3/10/2016    1844-3984 [employer]. [address] [place] [address]. All rights reserved. This information is not intended as a substitute for professional medical care. Always follow your healthcare professional's instructions.           Patient Education     Lumbar Flexion  (Flexibility)    1. Lie on your back on the floor, with your knees bent and your feet flat on the floor.  2. Gently pull your knees up toward your chest. Put your hands under your thighs to help pull your knees up.  3. Press your lower back down to the floor. Hold for 20 seconds.  4. Lower your legs back down to the floor and relax.  5. Repeat 2 times, or as instructed.  Date Last Reviewed: 3/10/2016    6251-8652 [employer]. [address] [place] [address]. All rights reserved. This information is not intended as a substitute for professional medical care. Always follow your healthcare professional's instructions.           Patient Education     Lumbar Stretch (Flexibility)    1. Lie on your back on the floor, with your knees bent and your feet flat on the floor. Don t press your neck or lower back to the floor.  2. Pull one knee up toward your chest. Clasp your hands under your thigh to help pull.  3. Hold for 30 to 60 seconds. Lower your leg back down to the floor.  4. Repeat 2 times, or as instructed.  5. Switch legs and repeat.  Date Last Reviewed: 3/10/2016    8264-3004 [employer]. [address] [place] [id number]. All rights reserved. This information is not intended as a substitute for professional medical care. Always follow your healthcare professional's instructions.

## 2019-09-29 NOTE — PATIENT INSTRUCTIONS
"  Patient Education       * Sciatica    Sciatica (\"Lumbar Radiculopathy\") causes a pain that spreads from the lower back down into the buttock, hip and leg. Sometimes leg pain can occur without any back pain. Sciatica is due to irritation or pressure on a spinal nerve as it comes out of the spinal canal. This is most often due to pressure on the nerve from a nearby spinal disk (the cartilage cushion between each spinal bone). Other causes include spinal stenosis (narrowing of the spinal canal) and spasm of the piriformis muscle (a muscle in the buttocks that the sciatic nerve passes through).  Sciatica may begin after a sudden twisting/bending force (such as in a car accident), or sometimes after a simple awkward movement. In either case, muscle spasm is commonly present and can add to the pain.  The diagnosis of sciatica is made from the symptoms and physical exam. Unless you had a physical injury (such as a car accident or fall), X-rays are usually not ordered for the initial evaluation of sciatica because the nerves and disks cannot be seen on an X-ray. Most sciatica (80-90%) gets better with time.  What can I do about my low back pain?  There are three main things you can do to ease low back pain and help it go away.    Stay active! Use positions, movements and exercises. Too much rest can make your symptoms worse.    Use heat or cold packs.    Take medicine as directed.  Using positions, movements and exercises  Research tells us that moving your joints and muscles can help you recover from back pain. Such activity should be simple and gentle.  Use walking to help relieve your discomfort. Try taking a short walk every 3 to 4 hours during the day. Walk for a few minutes inside your home or take longer walks outside, on a treadmill or at a mall. Slowly increase the amount of time you walk. Expect discomfort when you begin, but it should lessen as your back starts to recover.  Finding a position that is " comfortable  When your back pain is new, you may find that certain positions will ease your pain. Gently try each of the following positions until you find one that eases your pain. Once you find a position of comfort, use it as often as you like while you recover. Return to your daily routine as soon as possible.     Lie on your back with your legs bent. You can do this by placing a pillow under your knees or lie on the floor and rest your lower legs on the seat of a chair.    Lie on your side with your knees bent and place a pillow between your knees.    Lie on your stomach over pillows.  Using heat or cold packs  Try cold packs or gentle heat to ease your pain. Use whichever gives you the most relief. Apply the cold pack or heat for 15 minutes at a time, as often as needed.  Taking medicine  If taking over-the-counter medicine:    Take ibuprofen (Advil, Motrin) 600 mg. three times a day as needed for pain.  OR    Take Aleve (naproxen sodium) 220 to 440 mg. two times a day as needed for pain.  If your doctor prescribed medicine, follow the instructions. Stop taking the medicine as soon as you can.  When should I call my doctor?  Your back pain should improve over the first couple of weeks. As it improves, you should be able to return to your normal activities. But call your doctor if:    You have a sudden change in your ability to control? your bladder or bowels.    You begin to feel tingling in your groin or legs.    The pain spreads down your leg and into your foot.    Your toes, feet or leg muscles begin to feel weak.    You feel generally unwell or sick.    Your pain gets worse.    2400-3240 [employer]. [address] [place] [address], [place] [address]. All rights reserved. This information is not intended as a substitute for professional medical care. Always follow your healthcare professional's instructions.  This information has been modified by your health care provider with permission from the  publisher.           Patient Education     Understanding Lumbar Radiculopathy    Lumbar radiculopathy is irritation or inflammation of a nerve root in the low back. It causes symptoms that spread out from the back down one or both legs. To understand this condition, it helps to understand the parts of the spine:    Vertebrae. These are bones that stack to form the spine. The lumbar spine contains the 5 bottom vertebrae.    Disks. These are soft pads of tissue between the vertebrae. They act as shock absorbers for the spine.    Spinal canal. This is a tunnel formed within the stacked vertebrae. In the lumbar spine, nerves run through this canal.    Nerves. These branch off and leave the spinal canal, traveling out to parts of the body. As they leave the spinal canal, nerves pass through openings between the vertebrae. The nerve root is the part of the nerve that is closest to the spinal canal.    Sciatic nerve. This is a large nerve formed from several nerve roots in the low back. This nerve extends down the back of the leg to the foot.  With lumbar radiculopathy, nerve roots in the low back become irritated. This leads to pain and symptoms. The sciatic nerve is commonly involved, so the condition is often called sciatica.  What causes lumbar radiculopathy?  Aging, injury, poor posture, extra body weight, and other issues can lead to problems in the low back. These problems may then irritate nerve roots. They include:    Damage to a disk in the lumbar spine. The damaged disk may then press on nearby nerve roots.    Degeneration from wear and tear, and aging. This can lead to narrowing (stenosis) of the openings between the vertebrae. The narrowed openings press on nerve roots as they leave the spinal canal.    Unstable spine. This is when a vertebra slips forward. It can then press on a nerve root.  Other, less common things can put pressure on nerves in the low back. These include diabetes, infection, or a  tumor.  Symptoms of lumbar radiculopathy  These include:    Pain in the low back    Pain, numbness, tingling, or weakness that travels into the buttocks, hip, groin, or leg    Muscle spasms  Treatment for lumbar radiculopathy  In most cases, your healthcare provider will first try treatments that help relieve symptoms. These may include:    Prescription and over-the-counter pain medicines. These help relieve pain, swelling, and irritation.    Limits on positions and activities that increase pain. But lying in bed or avoiding all movement is only recommended for a short period of time.    Physical therapy, including exercises and stretches. This helps decrease pain and increase movement and function.    Steroid shots into the lower back. This may help relieve symptoms for a time.    Weight-loss program. If you are overweight, losing extra pounds may help relieve symptoms.  In some cases, you may need surgery to fix the underlying problem. This depends on the cause, the symptoms, and how long the pain has lasted.  Possible complications  Over time, an irritated and inflamed nerve may become damaged. This may lead to long-lasting (permanent) numbness or weakness in your legs and feet. If symptoms change suddenly or get worse, be sure to let your healthcare provider know.  When to call your healthcare provider  Call your healthcare provider right away if you have any of these:    New pain or pain that gets worse    New or increasing weakness, tingling, or numbness in your leg or foot    Problems controlling your bladder or bowel   Date Last Reviewed: 3/10/2016    7152-2587 [employer]. [address] [place] [address]. All rights reserved. This information is not intended as a substitute for professional medical care. Always follow your healthcare professional's instructions.           Patient Education     Lumbar Rotation    1. Lie on your back on the floor, with your knees bent and your feet  flat on the floor. Don t press your neck or lower back to the floor.  2. Lean both of your knees to one side. Turn your head in the opposite direction. Keep your shoulders flat on the floor. Be gentle and don t push through pain.  3. Hold for 20 seconds. Then slowly move your knees and head in the other direction.  4. Repeat 2 to 5 times, or as instructed.   Date Last Reviewed: 3/10/2016    1631-5700 [employer]. [address] [place] [address]. All rights reserved. This information is not intended as a substitute for professional medical care. Always follow your healthcare professional's instructions.           Patient Education     Lumbar Flexion (Flexibility)    1. Lie on your back on the floor, with your knees bent and your feet flat on the floor.  2. Gently pull your knees up toward your chest. Put your hands under your thighs to help pull your knees up.  3. Press your lower back down to the floor. Hold for 20 seconds.  4. Lower your legs back down to the floor and relax.  5. Repeat 2 times, or as instructed.  Date Last Reviewed: 3/10/2016    9023-3446 [employer]. [address] [place] [address]. All rights reserved. This information is not intended as a substitute for professional medical care. Always follow your healthcare professional's instructions.           Patient Education     Lumbar Stretch (Flexibility)    1. Lie on your back on the floor, with your knees bent and your feet flat on the floor. Don t press your neck or lower back to the floor.  2. Pull one knee up toward your chest. Clasp your hands under your thigh to help pull.  3. Hold for 30 to 60 seconds. Lower your leg back down to the floor.  4. Repeat 2 times, or as instructed.  5. Switch legs and repeat.  Date Last Reviewed: 3/10/2016    3659-9054 [employer]. [address] [place] [address]. All rights reserved. This information is not intended as a substitute for  professional medical care. Always follow your healthcare professional's instructions.

## 2020-03-02 ENCOUNTER — HEALTH MAINTENANCE LETTER (OUTPATIENT)
Age: 35
End: 2020-03-02

## 2020-05-05 ENCOUNTER — APPOINTMENT (OUTPATIENT)
Dept: URGENT CARE | Facility: URGENT CARE | Age: 35
End: 2020-05-05
Payer: COMMERCIAL

## 2020-05-05 ENCOUNTER — RESULTS ONLY (OUTPATIENT)
Dept: LAB | Age: 35
End: 2020-05-05

## 2020-05-06 LAB
SARS-COV-2 RNA SPEC QL NAA+PROBE: NOT DETECTED
SPECIMEN SOURCE: NORMAL

## 2020-07-15 ENCOUNTER — VIRTUAL VISIT (OUTPATIENT)
Dept: FAMILY MEDICINE | Facility: CLINIC | Age: 35
End: 2020-07-15
Payer: COMMERCIAL

## 2020-07-15 DIAGNOSIS — M62.830 BACK MUSCLE SPASM: Primary | ICD-10-CM

## 2020-07-15 PROCEDURE — 99213 OFFICE O/P EST LOW 20 MIN: CPT | Mod: 95 | Performed by: FAMILY MEDICINE

## 2020-07-15 RX ORDER — METHOCARBAMOL 500 MG/1
500-1000 TABLET, FILM COATED ORAL 4 TIMES DAILY PRN
Qty: 40 TABLET | Refills: 0 | Status: SHIPPED | OUTPATIENT
Start: 2020-07-15 | End: 2020-10-22

## 2020-07-15 NOTE — PATIENT INSTRUCTIONS
At [facility], we strive to deliver an exceptional experience to you, every time we see you. If you receive a survey, please complete it as we do value your feedback.  If you have MyChart, you can expect to receive results automatically within 24 hours of their completion.  Your provider will send a note interpreting your results as well.   If you do not have MyChart, you should receive your results in about a week by mail.    Your care team:                            Family Medicine Internal Medicine   MD Tony Stovall MD Shantel Branch-Fleming, MD Katya Georgiev PA-C Megan Hill, APRWILLIAM Vora, MD Pediatrics   James Borden, PAMARTINEZ Boone, MD Jami Medeiros APRN CNP   MD Tamiko Jordan MD Deborah Mielke, MD Kim Thein, APRN [facility]      Clinic hours: Monday - Thursday 7 am-7 pm; Fridays 7 am-5 pm.   Urgent care: Monday - Friday 11 am-9 pm; Saturday and Sunday 9 am-5 pm.    Clinic: [phone]       [facility]: Monday - Thursday 8 am - 7 pm; Friday 8 am - 6 pm  [facility]: [phone]     Use [email or web address] for 24/7 diagnosis and treatment of dozens of conditions.

## 2020-07-15 NOTE — PROGRESS NOTES
"Ebony Jo is a 35 year old female who is being evaluated via a billable video visit.      The patient has been notified of following:     \"This video visit will be conducted via a call between you and your physician/provider. We have found that certain health care needs can be provided without the need for an in-person physical exam.  This service lets us provide the care you need with a video conversation.  If a prescription is necessary we can send it directly to your pharmacy.  If lab work is needed we can place an order for that and you can then stop by our lab to have the test done at a later time.    Video visits are billed at different rates depending on your insurance coverage.  Please reach out to your insurance provider with any questions.    If during the course of the call the physician/provider feels a video visit is not appropriate, you will not be charged for this service.\"    Patient has given verbal consent for Video visit? Yes  How would you like to obtain your AVS? Samara  Patient would like the video invitation sent by: Text to cell phone: [phone]  Will anyone else be joining your video visit? No    Subjective     Ebony Jo is a 35 year old female who presents today via video visit for the following health issues:    HPI    Back Pain       Duration: last year         Specific cause: pregnancy     Description:   Location of pain: low back both  Character of pain: cramping  Pain radiation:upper spine   New numbness or weakness in legs, not attributed to pain:  no     Intensity: Currently 5/10    History:   Pain interferes with job: YES  History of back problems: no  Any previous MRI or X-rays: None  Sees a specialist for back pain: NO  Therapies tried without relief: acetaminophen (Tylenol)    Alleviating factors:   Improved by: acetaminophen (Tylenol) iniitally but having more pain episodes     Precipitating factors:  Worsened by: Bending        Accompanying Signs & Symptoms:  Risk of " "Fracture:  None  Risk of Cauda Equina:  None  Risk of Infection:  None  Risk of Cancer:  None  Risk of Ankylosing Spondylitis:  Onset at age <35, male, AND morning back stiffness. no      Video Start Time: 1104    Reviewed and updated as needed this visit by Provider  Tobacco  Allergies  Meds  Problems  Med Hx  Surg Hx  Fam Hx         Review of Systems   Constitutional, HEENT, cardiovascular, pulmonary, gi and gu systems are negative, except as otherwise noted.      Objective    Vitals - Patient Reported  Weight (Patient Reported): 80.7 kg (178 lb)  Height (Patient Reported): 162.6 cm (5' 4\")  BMI (Based on Pt Reported Ht/Wt): 30.55  Pain Score: Moderate Pain (5)        Physical Exam     GENERAL: Healthy, alert and no distress  EYES: Eyes grossly normal to inspection.  No discharge or erythema, or obvious scleral/conjunctival abnormalities.  RESP: No audible wheeze, cough, or visible cyanosis.  No visible retractions or increased work of breathing.    SKIN: Visible skin clear. No significant rash, abnormal pigmentation or lesions.  NEURO: Cranial nerves grossly intact.  Mentation and speech appropriate for age.  PSYCH: Mentation appears normal, affect normal/bright, judgement and insight intact, normal speech and appearance well-groomed.      Diagnostic Test Results:  Labs reviewed in Epic        Assessment & Plan     1. Back muscle spasm  Discussed bending safety. Try massager and methocarbamol  - methocarbamol (ROBAXIN) 500 MG tablet; Take 1-2 tablets (500-1,000 mg) by mouth 4 times daily as needed for muscle spasms  Dispense: 40 tablet; Refill: 0       The uses and side effects, including black box warnings as appropriate, were discussed in detail.  All patient questions were answered.  The patient was instructed to call immediately if any side effects developed.     Return in about 2 weeks (around 7/29/2020), or if symptoms worsen or fail to improve.    Desire Waldron MD  [facility] " GREGG LAWRENCE      Video-Visit Details    Type of service:  Video Visit    Video End Time:11:12 AM    Originating Location (pt. Location): Home    Distant Location (provider location):  [facility]     Platform used for Video Visit: [employer]Well    Return in about 2 weeks (around 7/29/2020), or if symptoms worsen or fail to improve.       Desire Waldron MD

## 2020-08-17 ENCOUNTER — OFFICE VISIT (OUTPATIENT)
Dept: URGENT CARE | Facility: URGENT CARE | Age: 35
End: 2020-08-17
Payer: COMMERCIAL

## 2020-08-17 VITALS
HEART RATE: 72 BPM | TEMPERATURE: 97.9 F | BODY MASS INDEX: 30.19 KG/M2 | WEIGHT: 180 LBS | SYSTOLIC BLOOD PRESSURE: 105 MMHG | DIASTOLIC BLOOD PRESSURE: 63 MMHG | OXYGEN SATURATION: 100 %

## 2020-08-17 DIAGNOSIS — R09.A9 FOREIGN BODY SENSATION IN EAR CANAL, RIGHT: Primary | ICD-10-CM

## 2020-08-17 PROCEDURE — 99213 OFFICE O/P EST LOW 20 MIN: CPT | Performed by: FAMILY MEDICINE

## 2020-08-17 ASSESSMENT — ENCOUNTER SYMPTOMS
CHILLS: 0
FEVER: 0
DIARRHEA: 0
COUGH: 0
HEADACHES: 0
VOMITING: 0
SORE THROAT: 0
NAUSEA: 0
RHINORRHEA: 0
SHORTNESS OF BREATH: 0

## 2020-08-17 NOTE — PROGRESS NOTES
SUBJECTIVE:   Ebony Jo is a 35 year old female presenting with a chief complaint of   Chief Complaint   Patient presents with     Urgent Care     Ear Problem     left ear has cotton ball stuck in it. Happened saturday morning. 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         Review of Systems   Constitutional: Negative for chills and fever.   HENT: Negative for congestion, ear pain, rhinorrhea and sore throat.    Respiratory: Negative for cough and shortness of breath.    Gastrointestinal: Negative for diarrhea, nausea and vomiting.   Neurological: Negative for headaches.        Patient Active Problem List   Diagnosis     CARDIOVASCULAR SCREENING; LDL GOAL LESS THAN 160     Migraine without aura     Hemifacial spasm, right     Acne vulgaris      Past Medical History:   Diagnosis Date     Hemifacial spasm, right 09/26/2013    stress-induced     Malaria 2009     Migraine without aura 9/26/2013     Seasonal allergies      Family History   Problem Relation Age of Onset     Diabetes Mother      Alzheimer Disease Father      Diabetes Brother      Current Outpatient Medications   Medication Sig Dispense Refill     Prenatal Vit-Fe Fumarate-FA (PRENATAL MULTIVITAMIN PLUS IRON) 27-0.8 MG TABS per tablet Take 1 tablet by mouth daily 100 tablet 3     clindamycin (CLEOCIN T) 1 % lotion Apply thin layer to affected areas once to twice daily. (Patient not taking: Reported on 9/29/2019) 60 mL 11     methocarbamol (ROBAXIN) 500 MG tablet Take 1-2 tablets (500-1,000 mg) by mouth 4 times daily as needed for muscle spasms (Patient not taking: Reported on 8/17/2020) 40 tablet 0     naproxen (NAPROSYN) 500 MG tablet Take 1 tablet (500 mg) by mouth 2 times daily as needed for moderate pain Take WITH food (Patient not taking: Reported on 7/15/2020) 30 tablet 0     Social History     Tobacco Use     Smoking status:  Never Smoker     Smokeless tobacco: Never Used   Substance Use Topics     Alcohol use: No     Alcohol/week: 0.0 standard drinks     Comment:  0-2  wine  glasses monthly, not in PG       OBJECTIVE  /63   Pulse 72   Temp 97.9  F (36.6  C) (Tympanic)   Wt 81.6 kg (180 lb)   SpO2 100%   BMI 30.19 kg/m      Physical Exam  HENT:      Head: Normocephalic and atraumatic.      Comments: Notable for mild erythema of the ear canal and erythema along the TM with small pinpoint hemorrhages likely related to her previous irrigation attempt.sign of secondary infection or otitis externa     Right Ear: Tympanic membrane, ear canal and external ear normal.      Left Ear: Tympanic membrane, ear canal and external ear normal.      Nose: Nose normal.      Mouth/Throat:      Pharynx: No oropharyngeal exudate.   Eyes:      General: No scleral icterus.        Right eye: No discharge.         Left eye: No discharge.      Conjunctiva/sclera: Conjunctivae normal.      Pupils: Pupils are equal, round, and reactive to light.   Neck:      Musculoskeletal: Neck supple.      Thyroid: No thyromegaly.      Trachea: No tracheal deviation.   Cardiovascular:      Rate and Rhythm: Normal rate and regular rhythm.      Heart sounds: Normal heart sounds. No murmur. No friction rub. No gallop.    Pulmonary:      Effort: Pulmonary effort is normal. No respiratory distress.      Breath sounds: Normal breath sounds. No stridor. No wheezing or rales.   Chest:      Chest wall: No tenderness.   Musculoskeletal:         General: No tenderness or deformity.   Lymphadenopathy:      Cervical: No cervical adenopathy.   Skin:     General: Skin is warm and dry.      Findings: No erythema or rash.   Neurological:      Mental Status: She is alert and oriented to person, place, and time.      Cranial Nerves: No cranial nerve deficit.   Psychiatric:         Judgment: Judgment normal.           ASSESSMENT:    ICD-10-CM    1. Foreign body sensation in ear canal,  left  H66.365           PLAN:   Proper use of Q-tips or carbamide peroxide drops was recommended  Encourage her to follow-up immediately for any increasing pain or worsening symptoms.  No antibiotic drops or otherwise are indicated at this time.  Exam is reassuring I do feel some of this sensation is likely related to her attempt to irrigate the ear with a syringe therefore irritating the ear canal and eardrum.  Patient educational/instructional material provided including reasons for follow-up   Jaciel James MD

## 2020-10-22 ENCOUNTER — OFFICE VISIT (OUTPATIENT)
Dept: OBGYN | Facility: CLINIC | Age: 35
End: 2020-10-22
Payer: COMMERCIAL

## 2020-10-22 VITALS
BODY MASS INDEX: 30.19 KG/M2 | HEART RATE: 67 BPM | WEIGHT: 180 LBS | SYSTOLIC BLOOD PRESSURE: 117 MMHG | DIASTOLIC BLOOD PRESSURE: 76 MMHG | OXYGEN SATURATION: 100 %

## 2020-10-22 DIAGNOSIS — Z31.69 ENCOUNTER FOR PRECONCEPTION CONSULTATION: ICD-10-CM

## 2020-10-22 DIAGNOSIS — N92.6 IRREGULAR MENSES: Primary | ICD-10-CM

## 2020-10-22 LAB
FSH SERPL-ACNC: 5.3 IU/L
HCG SERPL QL: NEGATIVE
LH SERPL-ACNC: 1.6 IU/L
PROLACTIN SERPL-MCNC: 8 UG/L (ref 3–27)
TSH SERPL DL<=0.005 MIU/L-ACNC: 2 MU/L (ref 0.4–4)

## 2020-10-22 PROCEDURE — 83001 ASSAY OF GONADOTROPIN (FSH): CPT | Performed by: OBSTETRICS & GYNECOLOGY

## 2020-10-22 PROCEDURE — 82670 ASSAY OF TOTAL ESTRADIOL: CPT | Performed by: OBSTETRICS & GYNECOLOGY

## 2020-10-22 PROCEDURE — 83002 ASSAY OF GONADOTROPIN (LH): CPT | Performed by: OBSTETRICS & GYNECOLOGY

## 2020-10-22 PROCEDURE — 99214 OFFICE O/P EST MOD 30 MIN: CPT | Performed by: OBSTETRICS & GYNECOLOGY

## 2020-10-22 PROCEDURE — 36415 COLL VENOUS BLD VENIPUNCTURE: CPT | Performed by: OBSTETRICS & GYNECOLOGY

## 2020-10-22 PROCEDURE — 84443 ASSAY THYROID STIM HORMONE: CPT | Performed by: OBSTETRICS & GYNECOLOGY

## 2020-10-22 PROCEDURE — 84703 CHORIONIC GONADOTROPIN ASSAY: CPT | Performed by: OBSTETRICS & GYNECOLOGY

## 2020-10-22 PROCEDURE — 84146 ASSAY OF PROLACTIN: CPT | Performed by: OBSTETRICS & GYNECOLOGY

## 2020-10-22 NOTE — PATIENT INSTRUCTIONS
If you have any questions regarding your visit, Please contact your care team.     [employer]: [phone]  [facility] CLINIC HOURS TELEPHONE NUMBER       Fredy Leal M.D.      Richie Campbell-Medical Assistant    Zenaida-  Darlene-     Monday-[place]  8:00a.m-4:45 p.m  Tuesday-[place]  9:00a.m-4:00 p.m  Wednesday-[place] 8:00a.m-4:45 p.m.  Thursday-[place]  8:00a.m-4:45 p.m.  Friday-[place]  8:00a.m-4:45 p.m. [facility]  [address]  [place], [place] [address] [phone] ask [facility]  [phone] Fax  Imaging [phone]    [facility]  [address]  [place], [place] [address] [phone]    [facility]  [address] [facility] [place] [address] [phone] [facility]  [phone] Fax  Imaging [phone]     Urgent Care locations:    [facility] Monday-Friday  5 pm - 9 pm  Saturday and Sunday   9 am - 5 pm  Monday-Friday   11 am - 9 pm  Saturday and Sunday   9 am - 5 pm   [phone]   If you need a medication refill, please contact your pharmacy. Please allow 3 business days for your refill to be completed.  As always, Thank you for trusting us with your healthcare needs!

## 2020-10-23 LAB — ESTRADIOL SERPL-MCNC: 43 PG/ML

## 2020-10-23 NOTE — PROGRESS NOTES
OB-GYN Problem-Oriented Visit or Consultation      Ebony Jo is a 35 year old year old P 1 who presents with a chief complaint of irregular menses.  Referred by self.  Patient's last menstrual period was 10/08/2020.    HPI:     Used DepoP after last pregnancy for three doses and stopped a year ago. Had some menses even on DepoP and continued with menses q 23 days until the last two were 32 day cycles and had a neg pregnancy test.  10/8 and LMP 10/19 and continuing, so this was a shorter cycle. Attempting pregnancy. Aware of AMA risks and potential for SAB too. Daughter doing well but just starting to talk.     Past medical, obstetrical, surgical, family and social history reviewed and as noted or updated in chart.     Allergies, meds and supplements are as noted or updated in chart.      ROS:   Systems reviewed include:  constitutional, breast, genitourinary, psychological, hematologic/lymphatic and endocrine.    These systems were negative for significant symptoms except for the following additional: none; see HPI.    EXAM:  VS as noted. /76 (BP Location: Left arm, Patient Position: Chair, Cuff Size: Adult Regular)   Pulse 67   Wt 81.6 kg (180 lb)   LMP 10/08/2020   SpO2 100%   Breastfeeding No   BMI 30.19 kg/m    Constitutionally normal.     Exam not repeated at this time.      Assessment:   Encounter Diagnoses   Name Primary?     Irregular menses Yes     Encounter for preconception consultation          Plan and Recommendations:   Total encounter time (physician together with patient) = 25min. Direct counseling, education and care coordination time (physician together with patient) = 15min. This included the following:   I reviewed the condition, causes, differential diagnosis, prognosis, evaluation and management considerations and options.  Questions answered and information given. See orders.     Check hormonal profile. Reassured that DepoP effects are now gone. Suspect some non-specific  irregular ovulation but observe. No other symptoms of premature menopause.  Preconception counseling reviewed as needed including cycle timing and optimization, medical status, meds, vaccines, exposures, nutrition, folic acid, family history, genetic screening (CF carrier scr, etc.), social issues and any applicable specific risk factors.       A/P:  Ebony was seen today for abnormal uterine bleeding.    Diagnoses and all orders for this visit:    Irregular menses  -     Follicle stimulating hormone  -     Lutropin  -     Estradiol  -     Prolactin  -     TSH  -     HCG qualitative, Blood ([id number])    Encounter for preconception consultation        Fredy Leal MD

## 2020-12-14 ENCOUNTER — HEALTH MAINTENANCE LETTER (OUTPATIENT)
Age: 35
End: 2020-12-14

## 2021-01-11 ENCOUNTER — MYC MEDICAL ADVICE (OUTPATIENT)
Dept: FAMILY MEDICINE | Facility: CLINIC | Age: 36
End: 2021-01-11

## 2021-01-13 DIAGNOSIS — R53.83 FATIGUE, UNSPECIFIED TYPE: ICD-10-CM

## 2021-01-13 LAB
HGB BLD-MCNC: 11.9 G/DL (ref 11.7–15.7)
TSH SERPL DL<=0.005 MIU/L-ACNC: 0.5 MU/L (ref 0.4–4)

## 2021-01-13 PROCEDURE — 36415 COLL VENOUS BLD VENIPUNCTURE: CPT | Performed by: OBSTETRICS & GYNECOLOGY

## 2021-01-13 PROCEDURE — 82306 VITAMIN D 25 HYDROXY: CPT | Performed by: OBSTETRICS & GYNECOLOGY

## 2021-01-13 PROCEDURE — 84443 ASSAY THYROID STIM HORMONE: CPT | Performed by: OBSTETRICS & GYNECOLOGY

## 2021-01-13 PROCEDURE — 85018 HEMOGLOBIN: CPT | Performed by: OBSTETRICS & GYNECOLOGY

## 2021-01-14 LAB — DEPRECATED CALCIDIOL+CALCIFEROL SERPL-MC: 22 UG/L (ref 20–75)

## 2021-01-21 ENCOUNTER — PRENATAL OFFICE VISIT (OUTPATIENT)
Dept: OBGYN | Facility: CLINIC | Age: 36
End: 2021-01-21

## 2021-01-21 VITALS
DIASTOLIC BLOOD PRESSURE: 78 MMHG | OXYGEN SATURATION: 99 % | WEIGHT: 181 LBS | HEART RATE: 82 BPM | SYSTOLIC BLOOD PRESSURE: 121 MMHG | BODY MASS INDEX: 30.35 KG/M2

## 2021-01-21 DIAGNOSIS — O09.529 ANTEPARTUM MULTIGRAVIDA OF ADVANCED MATERNAL AGE: ICD-10-CM

## 2021-01-21 DIAGNOSIS — O34.219 PREVIOUS CESAREAN DELIVERY, ANTEPARTUM CONDITION OR COMPLICATION: ICD-10-CM

## 2021-01-21 DIAGNOSIS — Z34.80 SUPERVISION OF OTHER NORMAL PREGNANCY, ANTEPARTUM: Primary | ICD-10-CM

## 2021-01-21 LAB
ERYTHROCYTE [DISTWIDTH] IN BLOOD BY AUTOMATED COUNT: 14 % (ref 10–15)
HCT VFR BLD AUTO: 35.4 % (ref 35–47)
HGB BLD-MCNC: 11.6 G/DL (ref 11.7–15.7)
MCH RBC QN AUTO: 28.6 PG (ref 26.5–33)
MCHC RBC AUTO-ENTMCNC: 32.8 G/DL (ref 31.5–36.5)
MCV RBC AUTO: 87 FL (ref 78–100)
PLATELET # BLD AUTO: 241 10E9/L (ref 150–450)
RBC # BLD AUTO: 4.05 10E12/L (ref 3.8–5.2)
WBC # BLD AUTO: 7 10E9/L (ref 4–11)

## 2021-01-21 PROCEDURE — 87389 HIV-1 AG W/HIV-1&-2 AB AG IA: CPT | Performed by: OBSTETRICS & GYNECOLOGY

## 2021-01-21 PROCEDURE — 36415 COLL VENOUS BLD VENIPUNCTURE: CPT | Performed by: OBSTETRICS & GYNECOLOGY

## 2021-01-21 PROCEDURE — 86901 BLOOD TYPING SEROLOGIC RH(D): CPT | Performed by: OBSTETRICS & GYNECOLOGY

## 2021-01-21 PROCEDURE — 87340 HEPATITIS B SURFACE AG IA: CPT | Performed by: OBSTETRICS & GYNECOLOGY

## 2021-01-21 PROCEDURE — 87086 URINE CULTURE/COLONY COUNT: CPT | Performed by: OBSTETRICS & GYNECOLOGY

## 2021-01-21 PROCEDURE — 86900 BLOOD TYPING SEROLOGIC ABO: CPT | Performed by: OBSTETRICS & GYNECOLOGY

## 2021-01-21 PROCEDURE — 86850 RBC ANTIBODY SCREEN: CPT | Performed by: OBSTETRICS & GYNECOLOGY

## 2021-01-21 PROCEDURE — 99207 PR FIRST OB VISIT: CPT | Performed by: OBSTETRICS & GYNECOLOGY

## 2021-01-21 PROCEDURE — 86762 RUBELLA ANTIBODY: CPT | Performed by: OBSTETRICS & GYNECOLOGY

## 2021-01-21 PROCEDURE — 85027 COMPLETE CBC AUTOMATED: CPT | Performed by: OBSTETRICS & GYNECOLOGY

## 2021-01-21 PROCEDURE — 99000 SPECIMEN HANDLING OFFICE-LAB: CPT | Performed by: OBSTETRICS & GYNECOLOGY

## 2021-01-21 PROCEDURE — 86780 TREPONEMA PALLIDUM: CPT | Mod: 90 | Performed by: OBSTETRICS & GYNECOLOGY

## 2021-01-21 NOTE — PATIENT INSTRUCTIONS
If you have any questions regarding your visit, Please contact your care team.     [email or web address] Services: [phone]  Women s Health CLINIC HOURS TELEPHONE NUMBER       Fredy Leal M.D.    Sanjana-MATTHEW Cannon-MATTHEW Campbell-Medical Assistant    Zenaida-  Darlene-     Monday-[place]  8:00a.m-4:45 p.m  Tuesday-[place]  9:00a.m-4:00 p.m  Wednesday-[place] 8:00a.m-4:45 p.m.  Thursday-[place]  8:00a.m-4:45 p.m.  Friday-[place]  8:00a.m-4:45 p.m. [facility]  [address] [place] [address] [phone] ask for [facility]  [phone] Fax  Imaging [phone]    [facility] Labor and Delivery  [address]  [place], [place] [address] [phone]    [facility]  [address]  [place] [address] [phone] ask [facility]  [phone] Fax  Imaging [phone]     Urgent Care locations:    [place]        [place] Monday-Friday  5 pm - 9 pm  Saturday and Sunday   9 am - 5 pm  Monday-Friday   11 am - 9 pm  Saturday and Sunday   9 am - 5 pm   [phone]   If you need a medication refill, please contact your pharmacy. Please allow 3 business days for your refill to be completed.  As always, Thank you for trusting us with your healthcare needs!

## 2021-01-22 ENCOUNTER — ANCILLARY PROCEDURE (OUTPATIENT)
Dept: ULTRASOUND IMAGING | Facility: OTHER | Age: 36
End: 2021-01-22
Attending: OBSTETRICS & GYNECOLOGY

## 2021-01-22 DIAGNOSIS — Z34.80 SUPERVISION OF OTHER NORMAL PREGNANCY, ANTEPARTUM: ICD-10-CM

## 2021-01-22 DIAGNOSIS — O34.219 PREVIOUS CESAREAN DELIVERY, ANTEPARTUM CONDITION OR COMPLICATION: ICD-10-CM

## 2021-01-22 LAB
ABO + RH BLD: NORMAL
ABO + RH BLD: NORMAL
BACTERIA SPEC CULT: NO GROWTH
BLD GP AB SCN SERPL QL: NORMAL
BLOOD BANK CMNT PATIENT-IMP: NORMAL
HBV SURFACE AG SERPL QL IA: NONREACTIVE
HIV 1+2 AB+HIV1 P24 AG SERPL QL IA: NONREACTIVE
Lab: NORMAL
SPECIMEN EXP DATE BLD: NORMAL
SPECIMEN SOURCE: NORMAL
T PALLIDUM AB SER QL: NONREACTIVE

## 2021-01-22 NOTE — PROGRESS NOTES
Ebony Jo is a 35 year old year old G 5 P 1 who presents for an initial obstetrical visit.  Referred by self.    Currently experiencing normal pregnancy symptoms without particular problems including pain, bleeding, marked vomiting or weight loss except: mod nausea and vomiting but improving.  This was a planned pregnancy.  Here today alone.   Additional concerns: menses irregular- dates, history of LT  section for FTP and will review plan over time, DARRELLA, had first dose of COVID-19 vaccine and seemed to have symptoms with weakness and marked fatigue after this- will not take the second dose.    Discussed special diagnostic and screening tests and plans (y = yes, n = no/declined, p = possibly/considering, d = done already, na = not applicable or past time): first trimester scr with NT and later AFP or with cell free DNA and later AFP = n, cell free DNA= n, CF carrier scr = n, hemoglobinopathy scr= n, SMA, fragile X  and other genetic scr= n, genetic amnio/CVS = n, quad scr = n, survey u/s = n, Level 2 survey u/s with MFM = y.      ROS:     Systems reviewed include constitutional; breast;                 cardiac; respiratory; gastrointestinal; genitourinary;                                musculoskeletal; integumentary; psychological;                                hematologic/lymphatic and endocrine.                  These systems were negative for significant symptoms except                 for the following: none and see above HPI.    Past medical, obstetrical, surgical, family and social history reviewed and as noted or updated in chart.     Allergies, meds and supplements are as noted or updated in chart.                    Episode OB dating, demographic and history forms completed or reviewed.    EXAM:  VS as noted. BMI- Body mass index is 30.35 kg/m .    Relatively recent normal general exam- not repeated now.       Ebony was seen today for prenatal care.    Diagnoses and all orders for this  visit:    Supervision of other normal pregnancy, antepartum  -     ABO/Rh type and screen  -     CBC with platelets  -     Hepatitis B surface antigen  -     Rubella Antibody IgG Quantitative  -     Urine Culture Aerobic Bacterial  -     HIV Antigen Antibody Combo  -     Treponema Abs w Reflex to RPR and Titer  -     US OB < 14 Weeks Single; Future    Previous  delivery, antepartum condition or complication  -     US OB < 14 Weeks Single; Future    Antepartum multigravida of advanced maternal age        PLAN: Counseling included the following: Advice appropriate to gestational age reviewed including pertinent Checklist items. Discussed condition, tests and general care plan. Symptoms, problems and concerns reviewed. Recommended weight gain discussed. Problem list initiated.   30 minutes spent on the date of encounter doing chart review, history, examination, discussion with patient, and documentation, and further activities as noted above, and review of appropriateness of decision-making for care, and review of test results, and interpretation of test results.  Check ultrasound now. Pap due in 1 yr. Orders as noted. RTC in 4 weeks.     Fredy Leal MD

## 2021-01-23 LAB — RUBV IGG SERPL IA-ACNC: 139 IU/ML

## 2021-02-24 ENCOUNTER — PRENATAL OFFICE VISIT (OUTPATIENT)
Dept: OBGYN | Facility: CLINIC | Age: 36
End: 2021-02-24
Payer: COMMERCIAL

## 2021-02-24 VITALS
WEIGHT: 181 LBS | OXYGEN SATURATION: 100 % | HEART RATE: 68 BPM | TEMPERATURE: 99 F | SYSTOLIC BLOOD PRESSURE: 108 MMHG | DIASTOLIC BLOOD PRESSURE: 69 MMHG | BODY MASS INDEX: 30.35 KG/M2

## 2021-02-24 DIAGNOSIS — Z34.80 SUPERVISION OF OTHER NORMAL PREGNANCY, ANTEPARTUM: ICD-10-CM

## 2021-02-24 DIAGNOSIS — O34.219 PREVIOUS CESAREAN DELIVERY, ANTEPARTUM CONDITION OR COMPLICATION: ICD-10-CM

## 2021-02-24 PROCEDURE — 99207 PR PRENATAL VISIT: CPT | Performed by: OBSTETRICS & GYNECOLOGY

## 2021-02-24 RX ORDER — ACETAMINOPHEN 500 MG
500-1000 TABLET ORAL EVERY 6 HOURS PRN
COMMUNITY

## 2021-02-24 NOTE — PATIENT INSTRUCTIONS
If you have any questions regarding your visit, Please contact your care team.     [email or web address] Services: [phone]  Women s Health CLINIC HOURS TELEPHONE NUMBER       Fredy Leal M.D.    Sanjana-MATTHEW Cannon-MATTHEW Campbell-Medical Assistant    Zenaida-  Darlene-     Monday-[place]  8:00a.m-4:45 p.m  Tuesday-[place]  9:00a.m-4:00 p.m  Wednesday-[place] 8:00a.m-4:45 p.m.  Thursday-[place]  8:00a.m-4:45 p.m.  Friday-[place]  8:00a.m-4:45 p.m. [facility]  [address] [facility] [place] [address] [phone] ask for [facility]  [phone] Fax  Imaging [phone]    [facility] Labor and Delivery  [address]  [place], [place] [address] [phone]    [facility]  [address]  [place] [address] [phone] ask [facility]  [phone] Fax  Imaging [phone]     Urgent Care locations:    [place]        [place] Monday-Friday  5 pm - 9 pm  Saturday and Sunday   9 am - 5 pm  Monday-Friday   11 am - 9 pm  Saturday and Sunday   9 am - 5 pm   [phone]   If you need a medication refill, please contact your pharmacy. Please allow 3 business days for your refill to be completed.  As always, Thank you for trusting us with your healthcare needs!

## 2021-02-24 NOTE — PROGRESS NOTES
No significant signs, symptoms or concerns. Dates per early ultrasound due to irregular menses. ACOG  pamphlet given. Discussed TOLAC or repeat  section and plans repeat  section.   Plans Level 2 ultrasound and still considering possible cell free DNA testing after that.    Counseling included the following: Advice appropriate to gestational age reviewed including pertinent Checklist items. Discussed condition, tests and care plan including risks, benefits, and alternatives. Problem list updated.   20 minutes spent on the date of encounter doing chart review, history, examination, discussion with patient, and documentation, and further activities as noted, and review of appropriateness of decision-making for care, and review of test results, and interpretation of test results.  A/P:  Ebony was seen today for prenatal care.    Diagnoses and all orders for this visit:    Supervision of other normal pregnancy, antepartum    Previous  delivery, antepartum condition or complication        Freyd Leal MD

## 2021-03-24 ENCOUNTER — PRENATAL OFFICE VISIT (OUTPATIENT)
Dept: OBGYN | Facility: CLINIC | Age: 36
End: 2021-03-24
Payer: COMMERCIAL

## 2021-03-24 VITALS
HEART RATE: 85 BPM | DIASTOLIC BLOOD PRESSURE: 73 MMHG | WEIGHT: 187 LBS | SYSTOLIC BLOOD PRESSURE: 109 MMHG | OXYGEN SATURATION: 98 % | BODY MASS INDEX: 31.36 KG/M2

## 2021-03-24 DIAGNOSIS — Z34.80 SUPERVISION OF OTHER NORMAL PREGNANCY, ANTEPARTUM: ICD-10-CM

## 2021-03-24 PROCEDURE — 99207 PR PRENATAL VISIT: CPT | Performed by: OBSTETRICS & GYNECOLOGY

## 2021-03-24 NOTE — PATIENT INSTRUCTIONS
If you have any questions regarding your visit, Please contact your care team.     [facility] Services: [phone]  Women s Health CLINIC HOURS TELEPHONE NUMBER       Fredy Leal M.D.    Sanjana-MATTHEW Cannon-MATTHEW Campbell-Medical Assistant    Zenaida-  Darlene-     Monday-[place]  8:00a.m-4:45 p.m  Tuesday-[place]  9:00a.m-4:00 p.m  Wednesday-[place] 8:00a.m-4:45 p.m.  Thursday-[place]  8:00a.m-4:45 p.m.  Friday-[place]  8:00a.m-4:45 p.m. [facility]  [address] [facility] [place] [address] [phone] ask for [facility]  [phone] Fax  Imaging [phone]    [facility] Labor and Delivery  [address]  [place], [place] [address] [phone]    [facility]  [address]  [place] [address] [phone] ask [facility]  [phone] Fax  Imaging [phone]     Urgent Care locations:    [place]        [place] Monday-Friday  5 pm - 9 pm  Saturday and Sunday   9 am - 5 pm  Monday-Friday   11 am - 9 pm  Saturday and Sunday   9 am - 5 pm   [phone]   If you need a medication refill, please contact your pharmacy. Please allow 3 business days for your refill to be completed.  As always, Thank you for trusting us with your healthcare needs!

## 2021-03-24 NOTE — PROGRESS NOTES
No significant signs, symptoms or concerns except poor sleep. Here with daughter.   Counseling included the following: Advice appropriate to gestational age reviewed including pertinent Checklist items. Discussed condition, tests and care plan including risks, benefits, and alternatives. Problem list updated.   20 minutes spent on the date of encounter doing chart review, history, examination, discussion with patient, and documentation, and further activities as noted, and review of appropriateness of decision-making for care.  Level 2 survey ultrasound soon.   A/P:  Ebony was seen today for prenatal care.    Diagnoses and all orders for this visit:    Supervision of other normal pregnancy, antepartum        Fredy Leal MD

## 2021-03-25 ENCOUNTER — MEDICAL CORRESPONDENCE (OUTPATIENT)
Dept: HEALTH INFORMATION MANAGEMENT | Facility: CLINIC | Age: 36
End: 2021-03-25

## 2021-03-29 ENCOUNTER — OFFICE VISIT (OUTPATIENT)
Dept: MATERNAL FETAL MEDICINE | Facility: CLINIC | Age: 36
End: 2021-03-29
Payer: COMMERCIAL

## 2021-03-29 ENCOUNTER — HOSPITAL ENCOUNTER (OUTPATIENT)
Dept: ULTRASOUND IMAGING | Facility: CLINIC | Age: 36
End: 2021-03-29
Payer: COMMERCIAL

## 2021-03-29 ENCOUNTER — PRE VISIT (OUTPATIENT)
Dept: MATERNAL FETAL MEDICINE | Facility: CLINIC | Age: 36
End: 2021-03-29

## 2021-03-29 DIAGNOSIS — O26.90 PREGNANCY RELATED CONDITION, ANTEPARTUM: ICD-10-CM

## 2021-03-29 DIAGNOSIS — O09.522 MULTIGRAVIDA OF ADVANCED MATERNAL AGE IN SECOND TRIMESTER: Primary | ICD-10-CM

## 2021-03-29 PROCEDURE — 76811 OB US DETAILED SNGL FETUS: CPT

## 2021-03-29 PROCEDURE — 76811 OB US DETAILED SNGL FETUS: CPT | Mod: 26 | Performed by: OBSTETRICS & GYNECOLOGY

## 2021-03-29 NOTE — PROGRESS NOTES
The patient was seen for an ultrasound in the Maternal[facility]Fetal [facility] Center at the [facility] today.  For a detailed report of the ultrasound examination, please see the ultrasound report which can be found under the imaging tab.    Tamiko Rosenberg MD  , OB/GYN  Maternal-Fetal Medicine  [phone] (Pager)

## 2021-04-30 ENCOUNTER — PRENATAL OFFICE VISIT (OUTPATIENT)
Dept: OBGYN | Facility: CLINIC | Age: 36
End: 2021-04-30
Payer: COMMERCIAL

## 2021-04-30 VITALS
HEART RATE: 68 BPM | BODY MASS INDEX: 32.03 KG/M2 | DIASTOLIC BLOOD PRESSURE: 69 MMHG | WEIGHT: 191 LBS | OXYGEN SATURATION: 98 % | SYSTOLIC BLOOD PRESSURE: 109 MMHG

## 2021-04-30 DIAGNOSIS — Z34.80 SUPERVISION OF OTHER NORMAL PREGNANCY, ANTEPARTUM: ICD-10-CM

## 2021-04-30 PROCEDURE — 99207 PR PRENATAL VISIT: CPT | Performed by: OBSTETRICS & GYNECOLOGY

## 2021-04-30 NOTE — LETTER
2021    RE: Ebony Jo,  1985    To Whom It May Concern:    Ebony Jo is under our care and was seen at our office on 2021.    Due to her medical condition and complications in her pregnancy she should work only light duty now and avoid prolonged standing, excessive walking, or heavy lifting over 20 pounds.   Her due date is 2021.     I hope this information is sufficient for your needs.  If you have any additional questions regarding this matter please contact our office.  Thank you.      Sincerely,        Fredy Leal MD

## 2021-04-30 NOTE — PATIENT INSTRUCTIONS
If you have any questions regarding your visit, Please contact your care team.     [email or web address] Services: [phone]  Women s Health CLINIC HOURS TELEPHONE NUMBER       Fredy Leal M.D.    Sanjana-MATTHEW Cannon-MATTHEW Campbell-Medical Assistant    Zenaida-  Darlene-     Monday-[place]  8:00a.m-4:45 p.m  Tuesday-[place]  9:00a.m-4:00 p.m  Wednesday-[place] 8:00a.m-4:45 p.m.  Thursday-[place]  8:00a.m-4:45 p.m.  Friday-[place]  8:00a.m-4:45 p.m. [facility]  [address] [facility] [address] [place] [address] [phone] ask for [facility]  [phone] Fax  Imaging [phone]    [facility] Labor and Delivery  [address]  [place], [place] [address] [phone]    [facility]  [address]  [place] [address] [phone] ask [facility]  [phone] Fax  Imaging [phone]     Urgent Care locations:    [place]        [place] Monday-Friday  5 pm - 9 pm  Saturday and Sunday   9 am - 5 pm  Monday-Friday   11 am - 9 pm  Saturday and Sunday   9 am - 5 pm   [phone]   If you need a medication refill, please contact your pharmacy. Please allow 3 business days for your refill to be completed.  As always, Thank you for trusting us with your healthcare needs!

## 2021-04-30 NOTE — PROGRESS NOTES
No significant signs, symptoms or concerns except work fatigue and will change to light duty- letter.    Counseling included the following: Advice appropriate to gestational age reviewed including pertinent Checklist items. Discussed condition, tests and care plan including risks, benefits, and alternatives. Problem list updated.   20 minutes spent on the date of encounter doing chart review, history, examination, discussion with patient, and documentation, and further activities as noted, and review of appropriateness of decision-making for care, and review of test results, and interpretation of test results, and review of outside records.  1h GTT and schedule  section next.  A/P:  Ebony was seen today for prenatal care.    Diagnoses and all orders for this visit:    Supervision of other normal pregnancy, antepartum        Fredy Leal MD

## 2021-06-02 ENCOUNTER — PRENATAL OFFICE VISIT (OUTPATIENT)
Dept: OBGYN | Facility: CLINIC | Age: 36
End: 2021-06-02
Payer: COMMERCIAL

## 2021-06-02 VITALS
DIASTOLIC BLOOD PRESSURE: 65 MMHG | WEIGHT: 197 LBS | OXYGEN SATURATION: 100 % | BODY MASS INDEX: 33.04 KG/M2 | HEART RATE: 99 BPM | SYSTOLIC BLOOD PRESSURE: 102 MMHG

## 2021-06-02 DIAGNOSIS — Z34.80 SUPERVISION OF OTHER NORMAL PREGNANCY, ANTEPARTUM: ICD-10-CM

## 2021-06-02 DIAGNOSIS — O34.219 PREVIOUS CESAREAN DELIVERY, ANTEPARTUM CONDITION OR COMPLICATION: ICD-10-CM

## 2021-06-02 LAB
GLUCOSE 1H P 50 G GLC PO SERPL-MCNC: 127 MG/DL (ref 60–129)
HGB BLD-MCNC: 11.6 G/DL (ref 11.7–15.7)

## 2021-06-02 PROCEDURE — 99N1025 PR STATISTIC OBHBG - HEMOGLOBIN: Performed by: OBSTETRICS & GYNECOLOGY

## 2021-06-02 PROCEDURE — 36415 COLL VENOUS BLD VENIPUNCTURE: CPT | Performed by: OBSTETRICS & GYNECOLOGY

## 2021-06-02 PROCEDURE — 99207 PR PRENATAL VISIT: CPT | Performed by: OBSTETRICS & GYNECOLOGY

## 2021-06-02 PROCEDURE — 82950 GLUCOSE TEST: CPT | Performed by: OBSTETRICS & GYNECOLOGY

## 2021-06-02 NOTE — PATIENT INSTRUCTIONS
If you have any questions regarding your visit, Please contact your care team.     [employer] Services: [phone]  Women s Health CLINIC HOURS TELEPHONE NUMBER       Fredy Leal M.D.    Sanjana-MATTHEW Cannon-MATTHEW Campbell-Medical Assistant    Zenaida-  Darlene-     Monday-[place]  8:00a.m-4:45 p.m  Tuesday-[place]  9:00a.m-4:00 p.m  Wednesday-[place] 8:00a.m-4:45 p.m.  Thursday-[place]  8:00a.m-4:45 p.m.  Friday-[place]  8:00a.m-4:45 p.m. [facility]  [address] [facility] [place] [address] [phone] ask for [facility]  [phone] Fax  Imaging [phone]    [facility] Labor and Delivery  [address]  [place], [place] [address] [phone]    [facility]  [address]  [place] [address] [phone] ask [facility]  [phone] Fax  Imaging [phone]     Urgent Care locations:    [place]        [place] Monday-Friday  5 pm - 9 pm  Saturday and Sunday   9 am - 5 pm  Monday-Friday   11 am - 9 pm  Saturday and Sunday   9 am - 5 pm   [phone]   If you need a medication refill, please contact your pharmacy. Please allow 3 business days for your refill to be completed.  As always, Thank you for trusting us with your healthcare needs!

## 2021-06-03 ENCOUNTER — TELEPHONE (OUTPATIENT)
Dept: OBGYN | Facility: CLINIC | Age: 36
End: 2021-06-03

## 2021-06-03 NOTE — PROGRESS NOTES
No significant signs, symptoms or concerns except early satiety. Desires TL with repeat  section- discussed in detail and consent signed. Will schedule.   Counseling included the following: Advice appropriate to gestational age reviewed including pertinent Checklist items. Discussed condition, tests and care plan including risks, benefits, and alternatives. Problem list updated.   20 minutes spent on the date of encounter doing chart review, history, examination, discussion with patient, and documentation, and further activities as noted, and review of appropriateness of decision-making for care, and review of test results, and interpretation of test results.  A/P:  Ebony was seen today for prenatal care.    Diagnoses and all orders for this visit:    Supervision of other normal pregnancy, antepartum  -     Glucose tolerance gest screen 1 hour  -     OB hemoglobin  -     Tomasa-Operative Worksheet    Previous  delivery, antepartum condition or complication  -     Tomasa-Operative Worksheet        Fredy Leal MD

## 2021-06-03 NOTE — TELEPHONE ENCOUNTER
Procedure name(s) - multi select  Delivery and bilateral partial salpingectomy    Reason for procedure repeat and elective sterilization    Surgeon: Mel    Is this a multi surgeon case? No    Laterality N/A    Request for additional equipment Other (see comments) None   Anesthesia Spinal    Positioning (if different from preference card): Supine    Is the patient known to have any of the following? None of the above    Initiate Pre-op orders for above procedure: Yes, as ordered in Epic additional orders noted there also   Location of Case: [facility]    Sterilization or Hysterectomy consent signed in advance: Yes (note date signed in comments) 21   Operating room  requested: Yes    Urgency of Surgery: Routine    Surgeon Procedure Time (incision to closure) in minutes (per procedure as applicable) 60    Note:  Surgical Case Time Needed (in minutes)   Surgery Date: 39-40 weeks    Patient Class (for admit prior to surgery, specify number of days in comments): Surgery admit admit day of surgery   Length of Stay: 3 days    H&P To Be Completed By: Surgeon at future OB visit   Where is the note? In [facility] Note     needed? No    Post-Op Appointment 6 weeks    Vendor Needed? No      SURGERY SCHEDULING AND PRECERTIFICATION    Medical Record Number: [id number]  Ebony Jo  YOB: 1985   Phone: [phone] (home)   Primary Provider: Desire Dumont    Reason for Admit:  Repeat C/S and desires sterilization   ICD-10 CODE:  034.219  Z30.2    Surgeon: Fredy Leal MD  Surgical Procedure:  Delivery and bilateral partial salpingectomy    Date of Surgery 2021 Time of Surgery 9:30 am   Surgery to be performed at:  [facility]  Status: Inpatient- Length of stay:  3 days.  Type of Anesthesia Anticipated: Spinal     Sterilization consent:  Yes and was signed on 2021.    Pre-Op: On 08/10/2021 with Mel jaimes   Post-Op:  6 weeks  on 09/24 with Mel at    COVID testing:  The Covid test has been scheduled for MG at Lab at 08/09/21 3:30.    Pre-certification routed to Financial Counselors:  Yes    Surgery packet mailed to patient's home address: Yes  Patient instructed NPO 12 hours prior to surgery, arrive 1.5 hour(s) prior to surgery, must have a .  Patient understood and agrees to the plan.      Requestor:  Zenaida King     Location:  [place] [address]

## 2021-06-23 ENCOUNTER — PRENATAL OFFICE VISIT (OUTPATIENT)
Dept: OBGYN | Facility: CLINIC | Age: 36
End: 2021-06-23
Payer: COMMERCIAL

## 2021-06-23 VITALS
BODY MASS INDEX: 33.04 KG/M2 | WEIGHT: 197 LBS | HEART RATE: 84 BPM | OXYGEN SATURATION: 100 % | SYSTOLIC BLOOD PRESSURE: 112 MMHG | DIASTOLIC BLOOD PRESSURE: 70 MMHG

## 2021-06-23 DIAGNOSIS — Z34.80 SUPERVISION OF OTHER NORMAL PREGNANCY, ANTEPARTUM: ICD-10-CM

## 2021-06-23 PROCEDURE — 99207 PR PRENATAL VISIT: CPT | Performed by: OBSTETRICS & GYNECOLOGY

## 2021-06-23 NOTE — PATIENT INSTRUCTIONS
If you have any questions regarding your visit, Please contact your care team.     [employer] Services: [phone]  Women s Health CLINIC HOURS TELEPHONE NUMBER       Fredy Leal M.D.    Sanjana-MATTHEW Cannon-MATTHEW Campbell-Medical Assistant    Zenaida-  Darlene-     Monday-[place]  8:00a.m-4:45 p.m  Tuesday-[place]  9:00a.m-4:00 p.m  Wednesday-[place] 8:00a.m-4:45 p.m.  Thursday-[place]  8:00a.m-4:45 p.m.  Friday-[place]  8:00a.m-4:45 p.m. [facility]  [address] [facility] [place] [address] [phone] ask for [facility]  [phone] Fax  Imaging [phone]    [facility] Labor and Delivery  [address]  [place], [place] [address] [phone]    [facility]  [address]  [place] [address] [phone] ask [facility]  [phone] Fax  Imaging [phone]     Urgent Care locations:    [place]        [place] Monday-Friday  5 pm - 9 pm  Saturday and Sunday   9 am - 5 pm  Monday-Friday   11 am - 9 pm  Saturday and Sunday   9 am - 5 pm   [phone]   If you need a medication refill, please contact your pharmacy. Please allow 3 business days for your refill to be completed.  As always, Thank you for trusting us with your healthcare needs!

## 2021-06-23 NOTE — PROGRESS NOTES
No significant signs, symptoms or concerns except felt weak as per message but better with improved diet.    Counseling included the following: Advice appropriate to gestational age reviewed including pertinent Checklist items. Discussed condition, tests and care plan including risks, benefits, and alternatives. Problem list updated.   20 minutes spent on the date of encounter doing chart review, history, examination, discussion with patient, and documentation, and further activities as noted, and review of appropriateness of decision-making for care, and review of test results, and interpretation of test results.  A/P:  Ebony was seen today for prenatal care.    Diagnoses and all orders for this visit:    Supervision of other normal pregnancy, antepartum        Fredy Leal MD

## 2021-07-08 ENCOUNTER — PRENATAL OFFICE VISIT (OUTPATIENT)
Dept: OBGYN | Facility: CLINIC | Age: 36
End: 2021-07-08
Payer: COMMERCIAL

## 2021-07-08 VITALS
HEART RATE: 88 BPM | DIASTOLIC BLOOD PRESSURE: 66 MMHG | OXYGEN SATURATION: 100 % | WEIGHT: 197 LBS | SYSTOLIC BLOOD PRESSURE: 103 MMHG | BODY MASS INDEX: 33.04 KG/M2

## 2021-07-08 DIAGNOSIS — Z34.80 SUPERVISION OF OTHER NORMAL PREGNANCY, ANTEPARTUM: ICD-10-CM

## 2021-07-08 PROCEDURE — 99207 PR PRENATAL VISIT: CPT | Performed by: OBSTETRICS & GYNECOLOGY

## 2021-07-08 NOTE — PROGRESS NOTES
No significant signs, symptoms or concerns. Last day of work will be - will send us forms.  may be attending  section but patient plans to have him step out for the TL- discussed.      Counseling included the following: Advice appropriate to gestational age reviewed including pertinent Checklist items. Discussed condition, tests and care plan including risks, benefits, and alternatives. Problem list updated.   20 minutes spent on the date of encounter doing chart review, history, examination, discussion with patient, and documentation, and further activities as noted, and review of appropriateness of decision-making for care.  Possible GBS next.  A/P:  Ebony was seen today for prenatal care.    Diagnoses and all orders for this visit:    Supervision of other normal pregnancy, antepartum        Fredy Leal MD

## 2021-07-08 NOTE — PATIENT INSTRUCTIONS
If you have any questions regarding your visit, Please contact your care team.     [employer] [employer] Services: [phone]  Women s Health CLINIC HOURS TELEPHONE NUMBER       Fredy Leal M.D.    Sanjana-MATTHEW Cannon-MATTHEW Campbell-Medical Assistant    Zenaida-  Darlene-     Monday-[place]  8:00a.m-4:45 p.m  Tuesday-[place]  9:00a.m-4:00 p.m  Wednesday-[place] 8:00a.m-4:45 p.m.  Thursday-[place]  8:00a.m-4:45 p.m.  Friday-[place]  8:00a.m-4:45 p.m. [facility]  [address] [facility] [place] [address] [phone] ask for [facility]  [phone] Fax  Imaging [phone]    [facility] Labor and Delivery  [address]  [place], [place] [address] [phone]    [facility]  [address]  [place] [address] [phone] ask [facility]  [phone] Fax  Imaging [phone]     Urgent Care locations:    [place]        [place] Monday-Friday  5 pm - 9 pm  Saturday and Sunday   9 am - 5 pm  Monday-Friday   11 am - 9 pm  Saturday and Sunday   9 am - 5 pm   [phone]   If you need a medication refill, please contact your pharmacy. Please allow 3 business days for your refill to be completed.  As always, Thank you for trusting us with your healthcare needs!

## 2021-07-08 NOTE — LETTER
2021    RE: Ebony Jo,  1985    To Whom It May Concern:    Ebony Jo is under our care and was seen at our office on 2021.    Due to her medical condition and her pregnancy she should remain off work and begin her maternity leave 2021. Her due date is 2021.   I hope this information is sufficient for your needs.  If you have any additional questions regarding this matter please contact our office.  Thank you.      Sincerely,        Fredy Leal MD

## 2021-07-13 ENCOUNTER — TELEPHONE (OUTPATIENT)
Dept: OBGYN | Facility: CLINIC | Age: 36
End: 2021-07-13

## 2021-07-13 NOTE — TELEPHONE ENCOUNTER
.What type of form? work   What day did you drop off your forms? 7/13/21  Is there a due date? ASAP (7-10 business day to compete forms)   How would you like to receive these forms? Please fax to [phone]  Which clinic was the form dropped off at? [facility]  In [facility] in front office    What is the best number to contact you? Cell [phone]  What time works best to contact you with in 4 hrs? ANYTIME  Is it okay to leave a message? Yes    Fide Stewart

## 2021-07-14 NOTE — TELEPHONE ENCOUNTER
Form received from . Form is a signed release of information from [facility]. No other forms attached. File file for if forms are sent or brought to us in the future.    Shannan Sherwood, CARISSA

## 2021-07-19 ENCOUNTER — TELEPHONE (OUTPATIENT)
Dept: OBGYN | Facility: CLINIC | Age: 36
End: 2021-07-19

## 2021-07-19 NOTE — TELEPHONE ENCOUNTER
[facility] Call Center    Phone Message    May a detailed message be left on voicemail: yes     Reason for Call: Form or Letter   Type or form/letter needing completion: Leave of absence from work  Provider: Mel  Date form needed: ASAP  Once completed: Fax form to: Attn: Absent Management, [phone]      Action Taken: Message routed to:  [facility] p [phone]    Travel Screening: Not Applicable

## 2021-07-20 NOTE — TELEPHONE ENCOUNTER
Please complete forms.  section is scheduled for next month. Last day of work .     Fredy Leal MD

## 2021-07-20 NOTE — TELEPHONE ENCOUNTER
Spoke with pt.  Pt would like to have a letter written for leave.  Pt wants her leave to start on 7/30/2021    Please advise and write letter if appropriate.    MA, please fax letter to number provided below.  Carol Merritt CMA 7/20/2021 1:19 PM

## 2021-07-23 ENCOUNTER — PRENATAL OFFICE VISIT (OUTPATIENT)
Dept: OBGYN | Facility: CLINIC | Age: 36
End: 2021-07-23
Payer: COMMERCIAL

## 2021-07-23 VITALS
SYSTOLIC BLOOD PRESSURE: 108 MMHG | DIASTOLIC BLOOD PRESSURE: 68 MMHG | WEIGHT: 199 LBS | BODY MASS INDEX: 33.37 KG/M2 | OXYGEN SATURATION: 100 % | HEART RATE: 82 BPM

## 2021-07-23 DIAGNOSIS — Z34.80 SUPERVISION OF OTHER NORMAL PREGNANCY, ANTEPARTUM: ICD-10-CM

## 2021-07-23 PROCEDURE — 87653 STREP B DNA AMP PROBE: CPT | Performed by: OBSTETRICS & GYNECOLOGY

## 2021-07-23 PROCEDURE — 99207 PR PRENATAL VISIT: CPT | Performed by: OBSTETRICS & GYNECOLOGY

## 2021-07-23 NOTE — PROGRESS NOTES
No significant signs, symptoms or concerns except pregnancy discomforts and occasional contractions with activity. Precautions reviewed. Has discussed the TL with  and they agree.   Counseling included the following: Advice appropriate to gestational age reviewed including pertinent Checklist items. Discussed condition, tests and care plan including risks, benefits, and alternatives. Problem list updated.   20 minutes spent on the date of encounter doing chart review, history, examination, discussion with patient, and documentation, and further activities as noted, and review of appropriateness of decision-making for care.  Preop exam next.  A/P:  Ebony was seen today for prenatal care.    Diagnoses and all orders for this visit:    Supervision of other normal pregnancy, antepartum  -     Group B strep PCR        Fredy Leal MD

## 2021-07-24 LAB
GP B STREP DNA SPEC QL NAA+PROBE: NEGATIVE
PATIENT PENICILLIN, AMOXICILLIN, CEPHALOSPORINS ALLERGY: NO

## 2021-07-28 ENCOUNTER — TELEPHONE (OUTPATIENT)
Dept: OBGYN | Facility: CLINIC | Age: 36
End: 2021-07-28

## 2021-07-28 NOTE — TELEPHONE ENCOUNTER
[facility] Call Center    Phone Message    May a detailed message be left on voicemail: no     Reason for Call: Other: [employer] would like a call back to verify some information.  Patients due date, as well as patients back to work date. Claim number is [id number] Please advise. Thank you     Action Taken: Message routed to:  [facility] p [phone]    Travel Screening: Not Applicable

## 2021-08-05 ENCOUNTER — DOCUMENTATION ONLY (OUTPATIENT)
Dept: LAB | Facility: CLINIC | Age: 36
End: 2021-08-05

## 2021-08-05 ENCOUNTER — TELEPHONE (OUTPATIENT)
Dept: OBGYN | Facility: CLINIC | Age: 36
End: 2021-08-05

## 2021-08-05 DIAGNOSIS — Z34.80 SUPERVISION OF OTHER NORMAL PREGNANCY, ANTEPARTUM: Primary | ICD-10-CM

## 2021-08-05 DIAGNOSIS — O34.219 PREVIOUS CESAREAN DELIVERY, ANTEPARTUM CONDITION OR COMPLICATION: ICD-10-CM

## 2021-08-05 NOTE — TELEPHONE ENCOUNTER
Forms completed and Faxed to [employer] as requested    [facility] and sent to abstracting for scanning.    Please notify patient if she calls clinic.    Shannan Pagan CMA

## 2021-08-05 NOTE — PROGRESS NOTES
Please place a future order for a pre procedure covid swab, patient scheduled to come in on 08/09/21. Thank you - LAB

## 2021-08-09 ENCOUNTER — LAB (OUTPATIENT)
Dept: LAB | Facility: CLINIC | Age: 36
End: 2021-08-09
Payer: COMMERCIAL

## 2021-08-09 DIAGNOSIS — O34.219 PREVIOUS CESAREAN DELIVERY, ANTEPARTUM CONDITION OR COMPLICATION: ICD-10-CM

## 2021-08-09 DIAGNOSIS — Z34.80 SUPERVISION OF OTHER NORMAL PREGNANCY, ANTEPARTUM: ICD-10-CM

## 2021-08-09 PROCEDURE — U0003 INFECTIOUS AGENT DETECTION BY NUCLEIC ACID (DNA OR RNA); SEVERE ACUTE RESPIRATORY SYNDROME CORONAVIRUS 2 (SARS-COV-2) (CORONAVIRUS DISEASE [COVID-19]), AMPLIFIED PROBE TECHNIQUE, MAKING USE OF HIGH THROUGHPUT TECHNOLOGIES AS DESCRIBED BY CMS-2020-01-R: HCPCS

## 2021-08-09 PROCEDURE — U0005 INFEC AGEN DETEC AMPLI PROBE: HCPCS

## 2021-08-10 ENCOUNTER — PRENATAL OFFICE VISIT (OUTPATIENT)
Dept: OBGYN | Facility: CLINIC | Age: 36
End: 2021-08-10
Payer: COMMERCIAL

## 2021-08-10 VITALS
BODY MASS INDEX: 33.87 KG/M2 | DIASTOLIC BLOOD PRESSURE: 66 MMHG | SYSTOLIC BLOOD PRESSURE: 100 MMHG | WEIGHT: 202 LBS | HEART RATE: 80 BPM | OXYGEN SATURATION: 100 %

## 2021-08-10 DIAGNOSIS — O34.219 PREVIOUS CESAREAN DELIVERY, ANTEPARTUM CONDITION OR COMPLICATION: ICD-10-CM

## 2021-08-10 DIAGNOSIS — Z34.80 SUPERVISION OF OTHER NORMAL PREGNANCY, ANTEPARTUM: Primary | ICD-10-CM

## 2021-08-10 LAB — SARS-COV-2 RNA RESP QL NAA+PROBE: NEGATIVE

## 2021-08-10 PROCEDURE — 99207 PR PRENATAL VISIT: CPT | Performed by: OBSTETRICS & GYNECOLOGY

## 2021-08-10 NOTE — PATIENT INSTRUCTIONS
If you have any questions regarding your visit, Please contact your care team.     [employer]: [phone]  [facility] CLINIC HOURS TELEPHONE NUMBER       Fredy Leal M.D.    Sanjana-MATTHEW Cannon-MATTHEW Campbell-Medical Assistant    Zenaida-  Darlene-     Monday-[place]  8:00a.m-4:45 p.m  Tuesday-[place]  9:00a.m-4:00 p.m  Wednesday-[place] 8:00a.m-4:45 p.m.  Thursday-[place]  8:00a.m-4:45 p.m.  Friday-[place]  8:00a.m-4:45 p.m. [facility]  [address] [place] [address] [phone] ask for [facility]  [phone] Fax  Imaging [phone]    [facility] Delivery  [address]  [place], [place] [address] [phone]    [facility]  [address] [place]  [place] [address] [phone] ask [facility]  [phone] Fax  Imaging [phone]     Urgent Care locations:    [place]        [place] Monday-Friday  5 pm - 9 pm  Saturday and Sunday   9 am - 5 pm  Monday-Friday   11 am - 9 pm  Saturday and Sunday   9 am - 5 pm   [phone]   If you need a medication refill, please contact your pharmacy. Please allow 3 business days for your refill to be completed.  As always, Thank you for trusting us with your healthcare needs!

## 2021-08-11 NOTE — PROGRESS NOTES
No significant signs, symptoms or concerns.   Exam: Neck, Airway, Heart and Lungs neg or normal except class 3 airway.   Satisfactory pre-anesthetic medical exam.   Counseling included the following: Advice appropriate to gestational age reviewed including pertinent Checklist items. Discussed condition, tests and care plan including risks, benefits, and alternatives. Problem list updated.   20 minutes spent on the date of encounter doing chart review, history, examination, discussion with patient, and documentation, and further activities as noted, and review of appropriateness of decision-making for care.  A/P:  Ebony was seen today for prenatal care and pre-op exam.    Diagnoses and all orders for this visit:    Supervision of other normal pregnancy, antepartum    Previous  delivery, antepartum condition or complication        Fredy Leal MD

## 2021-09-20 ENCOUNTER — NURSE TRIAGE (OUTPATIENT)
Dept: NURSING | Facility: CLINIC | Age: 36
End: 2021-09-20

## 2021-09-20 NOTE — TELEPHONE ENCOUNTER
Request for 2nd COVID-19 vaccination due to 1st dose being received at a [facility] facility or vaccination site (i.e. clinic, pharmacy, school, vaccination pop-up clinic). Vaccination received at her job. She works a [employer] -  RN obtained the following information from: Patient    she lost her card, and dose not have date or Lot #    RN unable to placed the Order for 2 nd dose.   advised her to obtain this from her job or a her PCP clinic they have acces to [facility] and can give her this information.   Patient verbalized understanding and agrees with plan  Lucia Arredondo RN Nurse Triage Advisor 7:11 PM 9/20/2021     Has patient received Monoclonal Antibody Treatment in the previous 90 days?  NO - Okay to Proceed    The name of 1st dose vaccine product received as first dose: Moderna    Date 1st dose vaccination was given: back in january    Lot #: does not have it.    The 2nd dose of the mRNA COVID-19 vaccine product should be the same vaccine product as the 1st dose and be administered within appropriate timeframe.     Based on the information collected, the patient should receive the vaccine after 28 date.     Before placing the order, Confirm patient is appropriate for the vaccine product they received:  o Moderna: 18 Years   o Pfizer-BioNTech: 12 Years      Patient reminded that CONSENT will be needed at the time of the vaccine.    Patient reminded to bring vaccine card or documentation to verify first dose.    Remind the patient not to get any other vaccinations between now and the appointment, unless instructed by their provider.      Copy blue text above regarding the 1st dose and paste into appropriate order:    MODERNA COVID-19 VACCINE 2ND DOSE APPT; OR    PFIZER COVID-19 VACCINE 2ND DOSE APPT    Update Expected Date in order to reflect date in copied text    Dx Code Z23 HIGH PRIORITY FOR 2019-nCoV vaccine

## 2021-09-21 ENCOUNTER — OFFICE VISIT (OUTPATIENT)
Dept: OBGYN | Facility: CLINIC | Age: 36
End: 2021-09-21
Payer: COMMERCIAL

## 2021-09-21 VITALS — BODY MASS INDEX: 31.53 KG/M2 | SYSTOLIC BLOOD PRESSURE: 137 MMHG | WEIGHT: 188 LBS | DIASTOLIC BLOOD PRESSURE: 83 MMHG

## 2021-09-21 PROBLEM — O34.219 PREVIOUS CESAREAN DELIVERY, ANTEPARTUM CONDITION OR COMPLICATION: Status: RESOLVED | Noted: 2021-01-21 | Resolved: 2021-09-21

## 2021-09-21 PROBLEM — Z34.80 SUPERVISION OF OTHER NORMAL PREGNANCY, ANTEPARTUM: Status: RESOLVED | Noted: 2021-01-21 | Resolved: 2021-09-21

## 2021-09-21 PROBLEM — O09.529 ANTEPARTUM MULTIGRAVIDA OF ADVANCED MATERNAL AGE: Status: RESOLVED | Noted: 2021-01-21 | Resolved: 2021-09-21

## 2021-09-21 PROCEDURE — 99207 PR POST PARTUM EXAM: CPT | Performed by: OBSTETRICS & GYNECOLOGY

## 2021-09-21 ASSESSMENT — PATIENT HEALTH QUESTIONNAIRE - PHQ9
10. IF YOU CHECKED OFF ANY PROBLEMS, HOW DIFFICULT HAVE THESE PROBLEMS MADE IT FOR YOU TO DO YOUR WORK, TAKE CARE OF THINGS AT HOME, OR GET ALONG WITH OTHER PEOPLE: NOT DIFFICULT AT ALL
SUM OF ALL RESPONSES TO PHQ QUESTIONS 1-9: 1
SUM OF ALL RESPONSES TO PHQ QUESTIONS 1-9: 1

## 2021-09-21 NOTE — PATIENT INSTRUCTIONS
If you have any questions regarding your visit, Please contact your care team.     [facility]: [phone]  [facility] CLINIC HOURS TELEPHONE NUMBER       Fredy Leal M.D.    Sanjana-MATTHEW Cannon-MATTHEW Campbell-Medical Assistant    Zenaida-  Darlene-     Monday-[place]  8:00a.m-4:45 p.m  Tuesday-[place]  9:00a.m-4:00 p.m  Wednesday-[place] 8:00a.m-4:45 p.m.  Thursday-[place]  8:00a.m-4:45 p.m.  Friday-[place]  8:00a.m-4:45 p.m. [facility]  [address] [place] [address] [phone] ask for [facility]  [phone] Fax  Imaging [phone]    [facility] Labor [facility] Delivery  [address]  [place], [place] [address] [phone]    [facility]  [address]  [place] [address] [phone] ask [facility]  [phone] Fax  Imaging [phone]     Urgent Care locations:    [place]        [place] Monday-Friday  10 am - 8 pm  Saturday and Sunday   9 am - 5 pm  Monday-Friday   10 am - 8 pm  Saturday and Sunday   9 am - 5 pm   [phone]   If you need a medication refill, please contact your pharmacy. Please allow 3 business days for your refill to be completed.  As always, Thank you for trusting us with your healthcare needs!

## 2021-09-21 NOTE — LETTER
2021    RE: Ebony Jo,  1985    To Whom It May Concern:    Ebony Jo is under our care for her post-operative recovery following hospitalization at [facility].      She will be able to return to work on 10/11/2021 without restrictions.      I hope this information is sufficient for your needs.  If you have any additional questions regarding this matter please contact our office.  Thank you.    Sincerely,        Fredy Leal MD

## 2021-09-21 NOTE — PROGRESS NOTES
Ebony Jo is a 36 year old year old G 5 P 2 who is here today for a postpartum exam.    HPI:      Doing well and without signif sx or concerns. Currently breast and bottle (formula) feeding infant. Here today with children. Infant doing well. Contraceptive method planned is TL- done. NSA since delivery. PP depression screening as noted. See PHQ-9. Score = 1.    Past medical, obstetrical, surgical, family and social history reviewed and as noted or updated in chart.     Allergies, meds and supplements are as noted or updated in chart.      ROS:     Systems reviewed include constitutional; breast;                 cardiac; respiratory; gastrointestinal; genitourinary;                                musculoskeletal; integumentary; psychological;                                hematologic/lymphatic and endocrine.                  These systems were negative for significant symptoms except                 for the following: none and see HPI.                                Exam:  VS as noted.                    Abd is                             normal or negative except for, or in particular noting, the following                pertinent findings: none. Wd A.       Assessment: Postpartum exam    Plan and Recommendations: Counseling included the following: Symptoms, problems and concerns reviewed. Discussed pregnancy, birth, future pregnancy plans, work plans and infant care issues.  Problem list updated and Pregnancy Episode closed. See orders. Return to clinic in 12 months and Pap/HRHPV due then.  30 minutes spent on the date of encounter doing chart review, history, examination, discussion with patient, and documentation, and further activities as noted, and review of appropriateness of decision-making for care.  Considering second COVID vaccine now.    Ebony was seen today for prenatal care.    Diagnoses and all orders for this visit:    Routine postpartum follow-up        Fredy Leal MD

## 2021-09-22 ASSESSMENT — PATIENT HEALTH QUESTIONNAIRE - PHQ9: SUM OF ALL RESPONSES TO PHQ QUESTIONS 1-9: 1

## 2021-09-30 ENCOUNTER — IMMUNIZATION (OUTPATIENT)
Dept: NURSING | Facility: CLINIC | Age: 36
End: 2021-09-30
Attending: INTERNAL MEDICINE
Payer: COMMERCIAL

## 2021-09-30 DIAGNOSIS — Z23 HIGH PRIORITY FOR 2019 NOVEL CORONAVIRUS VACCINATION: Primary | ICD-10-CM

## 2021-09-30 DIAGNOSIS — Z23 HIGH PRIORITY FOR 2019 NOVEL CORONAVIRUS VACCINATION: ICD-10-CM

## 2021-09-30 PROCEDURE — 0012A PR COVID VAC MODERNA 100 MCG/0.5 ML IM: CPT

## 2021-09-30 PROCEDURE — 91301 PR COVID VAC MODERNA 100 MCG/0.5 ML IM: CPT

## 2021-10-02 ENCOUNTER — HEALTH MAINTENANCE LETTER (OUTPATIENT)
Age: 36
End: 2021-10-02

## 2021-10-26 ENCOUNTER — IMMUNIZATION (OUTPATIENT)
Dept: FAMILY MEDICINE | Facility: CLINIC | Age: 36
End: 2021-10-26
Payer: COMMERCIAL

## 2021-10-26 DIAGNOSIS — Z23 NEED FOR PROPHYLACTIC VACCINATION AND INOCULATION AGAINST INFLUENZA: Primary | ICD-10-CM

## 2021-10-26 PROCEDURE — 90471 IMMUNIZATION ADMIN: CPT

## 2021-10-26 PROCEDURE — 90686 IIV4 VACC NO PRSV 0.5 ML IM: CPT

## 2021-10-26 PROCEDURE — 99207 PR NO CHARGE NURSE ONLY: CPT

## 2021-12-27 ENCOUNTER — OFFICE VISIT (OUTPATIENT)
Dept: URGENT CARE | Facility: URGENT CARE | Age: 36
End: 2021-12-27
Payer: COMMERCIAL

## 2021-12-27 VITALS
OXYGEN SATURATION: 98 % | BODY MASS INDEX: 32.53 KG/M2 | DIASTOLIC BLOOD PRESSURE: 70 MMHG | HEART RATE: 59 BPM | WEIGHT: 194 LBS | TEMPERATURE: 97.3 F | SYSTOLIC BLOOD PRESSURE: 107 MMHG

## 2021-12-27 DIAGNOSIS — N61.0 MASTITIS, LEFT, ACUTE: Primary | ICD-10-CM

## 2021-12-27 PROCEDURE — 99213 OFFICE O/P EST LOW 20 MIN: CPT | Performed by: PHYSICIAN ASSISTANT

## 2021-12-27 RX ORDER — CEPHALEXIN 500 MG/1
500 CAPSULE ORAL 4 TIMES DAILY
Qty: 40 CAPSULE | Refills: 0 | Status: SHIPPED | OUTPATIENT
Start: 2021-12-27 | End: 2022-01-06

## 2021-12-27 ASSESSMENT — ENCOUNTER SYMPTOMS
WOUND: 0
WHEEZING: 0
CHEST TIGHTNESS: 0
SHORTNESS OF BREATH: 0
CONSTITUTIONAL NEGATIVE: 1
CHILLS: 0
COUGH: 0
RESPIRATORY NEGATIVE: 1
FATIGUE: 0
COLOR CHANGE: 0
PALPITATIONS: 0
CARDIOVASCULAR NEGATIVE: 1
FEVER: 0

## 2022-01-07 ENCOUNTER — OFFICE VISIT (OUTPATIENT)
Dept: DERMATOLOGY | Facility: CLINIC | Age: 37
End: 2022-01-07
Payer: COMMERCIAL

## 2022-01-07 DIAGNOSIS — L28.1 PRURIGO NODULARIS: Primary | ICD-10-CM

## 2022-01-07 DIAGNOSIS — L81.0 POST-INFLAMMATORY HYPERPIGMENTATION: ICD-10-CM

## 2022-01-07 DIAGNOSIS — L70.0 ACNE VULGARIS: ICD-10-CM

## 2022-01-07 PROCEDURE — 11900 INJECT SKIN LESIONS </W 7: CPT | Performed by: DERMATOLOGY

## 2022-01-07 PROCEDURE — 99203 OFFICE O/P NEW LOW 30 MIN: CPT | Mod: 25 | Performed by: DERMATOLOGY

## 2022-01-07 RX ORDER — TRIAMCINOLONE ACETONIDE 1 MG/G
CREAM TOPICAL
Qty: 30 G | Refills: 0 | Status: SHIPPED | OUTPATIENT
Start: 2022-01-07 | End: 2022-10-10

## 2022-01-07 ASSESSMENT — PAIN SCALES - GENERAL: PAINLEVEL: NO PAIN (0)

## 2022-01-07 NOTE — PATIENT INSTRUCTIONS
Hold creams if irritation      Intralesional Kenalog (ILK) Injections    Intralesional steroid injection involves a corticosteroid, such as triamcinolone acetonide (brand name Kenalog), which is injected directly into a lesion on or immediately below the skin. Intralesional kenalog may be used to treat the following skin conditions:      Alopecia areata    Discoid lupus erythematosus    Keloids/hypertrophic scars    Granuloma annulare and other granulomatous disorders    Hypertrophic lichen planus    Lichen simplex chronicus (neurodermatitis)    Localised psoriasis    Necrobiosis lipoidica    Acne cysts (nodulocystic acne)    Small infantile hemangiomas    Possible side-effects of intralesional Kenalog (ILK) injections include bleeding, pain, infection, contact dermatitis, nerve damage, scar formation and need for a repeat procedure.    Some people may experience delayed side-effects including:    Lipoatrophy, appearing as skin indentations or dimples around the injection sites a few weeks after treatment; these may be permanent.    White marks (leukoderma) or brown marks (postinflammatory pigmentation) at the site of injection or spreading from the site of injection - these may resolve or persist long term.    Telangiectasia, or small dilated blood vessels at the site of injection.     Increased hair growth at the site of injection - this resolves eventually.    Steroid acne: steroids increase growth hormone, leading to increased sebum (oil) production by the sebaceous glands. Steroid acne generally improves once the steroid has been stopped.      Who should I call with questions?    [facility]: [phone]     [facility]: [phone]    For urgent needs outside of business hours call the [facility] at [phone] and ask for the dermatology resident on call

## 2022-01-07 NOTE — PROGRESS NOTES
[facility] Dermatology Note  Encounter Date: Jan 7, 2022  Office Visit     Dermatology Problem List:  Last skin check 1/7/22  1. Acne vulgaris with PIH  - Current tx: OTC azelaic acid  - previous tx: clindamycin 1% lotion.   - ILK 2.5mg/cc on 1/7/22    Social history: Has 2 children. Works at the  at [employer].  ____________________________________________    Assessment & Plan:    # Acne vulgaris, not active today but needs prevention  - Continue azelaic acid cream once daily to shoulders, chest, and back. Refilled today. Reviewed to hold if irritating.  - Prescribed triamcinolone 0.1% BID x 3 weeks at a time for raised lesions for spot treatment. Review on thick lesions only due to steroid atrophy and hypopigmentation risk    # Prurigo nodularis, back, chest, and bilateral upper arms. Not itchy, pt picks  - Encouraged patient to stop picking at lesions.  - Recommended ILK injections today. See procedure note below. Patient is not pregnant. Patient is breast-feeding.  - Future considerations: N-acetylcysteine.    # PIH, upper back.  - Azelaic acid as above.  - Future considerations: hydroquinone     Procedures Performed:   Kenalog intralesional injection procedure note: After verbal consent and discussion of risks including but not limited to atrophy, pain, and bruising, time out was performed, the patient underwent positioning, 0.9 total cc of Kenalog 2.5 mg/cc was injected into 5 sites on the back, shoulders and chest.  The patient tolerated the procedure well and left the Dermatology clinic in good condition.      Follow-up: 12 weeks for ILK, 6 months sooner for concerns.    Staff and Scribe:     Scribe Disclosure:   I, Anabelle Martines, am serving as a scribe to document services personally performed by this physician, Dr. Collette Quezada, based on data collection and the provider's statements to me.    Provider Disclosure:   The documentation recorded by the scribe accurately reflects  the services I personally performed and the decisions made by me.    Collette Quezada MD    Department of Dermatology  [facility]: Phone: [phone], Fax:[phone]  [facility]: Phone: [phone], Fax: [phone]      ____________________________________________    CC: Derm Problem (Ebony is here today for skin check, has concerns of black spots on her upper body. States starts as a pimple but takes a long time to go away. )    HPI:  Ms. Ebony Jo is a(n) 36 year old female who presents today as a new patient for acne. Declined full body skin check today.    Technically a new patient. Last saw Dr. Blackmon on 9/4/18 for acne. Plan was to start clindamycin lotion in the AM and azelaic acid in the PM (she was currently pregnant).    Self referred today.    Today, Ebony notes concern of black spots on the upper body. These start as pimples, but take a long time to go away. Not using the medications from Dr. Blackmon's encounter. She is not currently pregnant. She is currently breast-feeding. Generally not itchy.    Patient is otherwise feeling well, without additional skin concerns.    Labs Reviewed:  N/A    Physical Exam:  Vitals: There were no vitals taken for this visit.  SKIN: Focused examination of the chest, back, and arms was performed.  - Hyperpigmented macules on the upper back.  - Lichenified 1.2cm plaque on the left upper back x 1, left upper arm x 1, left chest x 1, and right upper arm x 2.  - No other lesions of concern on areas examined.     Medications:  Current Outpatient Medications   Medication     acetaminophen (TYLENOL) 500 MG tablet     Prenatal Vit-Fe Fumarate-FA (PRENATAL MULTIVITAMIN PLUS IRON) 27-0.8 MG TABS per tablet     No current facility-administered medications for this visit.      Past Medical History:   Patient Active Problem List   Diagnosis      CARDIOVASCULAR SCREENING; LDL GOAL LESS THAN 160     Migraine without aura     Hemifacial spasm, right     Acne vulgaris     Past Medical History:   Diagnosis Date     Hemifacial spasm, right 09/26/2013    stress-induced     Malaria 2009     Migraine without aura 9/26/2013     Seasonal allergies         CC No referring provider defined for this encounter. on close of this encounter.

## 2022-01-07 NOTE — LETTER
1/7/2022         RE: Ebony Jo  [address]  [place] [address]        Dear Colleague,    Thank you for referring your patient, Ebony Jo, to the [facility]. Please see a copy of my visit note below.    [facility] Dermatology Note  Encounter Date: Jan 7, 2022  Office Visit     Dermatology Problem List:  Last skin check 1/7/22  1. Acne vulgaris with PIH  - Current tx: OTC azelaic acid  - previous tx: clindamycin 1% lotion.   - ILK 2.5mg/cc on 1/7/22    Social history: Has 2 children. Works at the  at [employer].  ____________________________________________    Assessment & Plan:    # Acne vulgaris, not active today but needs prevention  - Continue azelaic acid cream once daily to shoulders, chest, and back. Refilled today. Reviewed to hold if irritating.  - Prescribed triamcinolone 0.1% BID x 3 weeks at a time for raised lesions for spot treatment. Review on thick lesions only due to steroid atrophy and hypopigmentation risk    # Prurigo nodularis, back, chest, and bilateral upper arms. Not itchy, pt picks  - Encouraged patient to stop picking at lesions.  - Recommended ILK injections today. See procedure note below. Patient is not pregnant. Patient is breast-feeding.  - Future considerations: N-acetylcysteine.    # PIH, upper back.  - Azelaic acid as above.  - Future considerations: hydroquinone     Procedures Performed:   Kenalog intralesional injection procedure note: After verbal consent and discussion of risks including but not limited to atrophy, pain, and bruising, time out was performed, the patient underwent positioning, 0.9 total cc of Kenalog 2.5 mg/cc was injected into 5 sites on the back, shoulders and chest.  The patient tolerated the procedure well and left the Dermatology clinic in good condition.      Follow-up: 12 weeks for ILK, 6 months sooner for concerns.    Staff and Scribe:     Scribe Disclosure:   Anabelle PETERSON  elvira Martines serving as a scribe to document services personally performed by this physician, Dr. Collette Quezada, based on data collection and the provider's statements to me.    Provider Disclosure:   The documentation recorded by the scribe accurately reflects the services I personally performed and the decisions made by me.    Collette Quezada MD    Department of Dermatology  [facility]: Phone: [phone], Fax:[phone]  [facility]: Phone: [phone], Fax: [phone]      ____________________________________________    CC: Derm Problem (Ebony is here today for skin check, has concerns of black spots on her upper body. States starts as a pimple but takes a long time to go away. )    HPI:  Ms. Ebony Jo is a(n) 36 year old female who presents today as a new patient for acne. Declined full body skin check today.    Technically a new patient. Last saw Dr. Blackmon on 9/4/18 for acne. Plan was to start clindamycin lotion in the AM and azelaic acid in the PM (she was currently pregnant).    Self referred today.    Today, Ebony notes concern of black spots on the upper body. These start as pimples, but take a long time to go away. Not using the medications from Dr. Blackmon's encounter. She is not currently pregnant. She is currently breast-feeding. Generally not itchy.    Patient is otherwise feeling well, without additional skin concerns.    Labs Reviewed:  N/A    Physical Exam:  Vitals: There were no vitals taken for this visit.  SKIN: Focused examination of the chest, back, and arms was performed.  - Hyperpigmented macules on the upper back.  - Lichenified 1.2cm plaque on the left upper back x 1, left upper arm x 1, left chest x 1, and right upper arm x 2.  - No other lesions of concern on areas examined.     Medications:  Current Outpatient Medications   Medication      acetaminophen (TYLENOL) 500 MG tablet     Prenatal Vit-Fe Fumarate-FA (PRENATAL MULTIVITAMIN PLUS IRON) 27-0.8 MG TABS per tablet     No current facility-administered medications for this visit.      Past Medical History:   Patient Active Problem List   Diagnosis     CARDIOVASCULAR SCREENING; LDL GOAL LESS THAN 160     Migraine without aura     Hemifacial spasm, right     Acne vulgaris     Past Medical History:   Diagnosis Date     Hemifacial spasm, right 09/26/2013    stress-induced     Malaria 2009     Migraine without aura 9/26/2013     Seasonal allergies         CC No referring provider defined for this encounter. on close of this encounter.      Again, thank you for allowing me to participate in the care of your patient.        Sincerely,        Collette Quezada MD

## 2022-01-10 ENCOUNTER — TELEPHONE (OUTPATIENT)
Dept: DERMATOLOGY | Facility: CLINIC | Age: 37
End: 2022-01-10
Payer: COMMERCIAL

## 2022-01-10 NOTE — TELEPHONE ENCOUNTER
Please initiate PA for below medication.  Kalyani Warner, CARISSA on 1/10/2022 at 2:43 PM      azelaic acid (AZELEX) 20 % external cream 50 g 3 1/7/2022  --   Sig: Apply once daily to chest, shoulders, and back as tolerated.   Sent to pharmacy as: Azelaic Acid 20 % External Cream (AZELEX)   Class: E-Prescribe   Order: [id number]   E-Prescribing Status: Receipt confirmed by pharmacy (1/7/2022 11:13 AM CST)       Printout Tracking    External Result Report     Medication Administration Instructions    Apply once daily to chest, shoulders, and back as tolerated.     Pharmacy    [id number] - [place], [place] - [address] [place] [address] [address] [facility]     Associated Diagnoses    Post-inflammatory hyperpigmentation [L81.0]       Acne vulgaris [L70.0]

## 2022-01-12 NOTE — TELEPHONE ENCOUNTER
PA Initiation    Medication: azelaic acid (AZELEX) 20 % external cream   Insurance Company: [employer] - Phone [phone] Fax [phone]  Pharmacy Filling the Rx: [id number] - [place] [address] [facility]  Filling Pharmacy Phone: [phone]  Filling Pharmacy Fax: [phone]  Start Date: 1/12/2022

## 2022-01-14 NOTE — TELEPHONE ENCOUNTER
Prior Authorization Approval    Authorization Effective Date: 1/13/2022  Authorization Expiration Date: 1/13/2023  Medication: azelaic acid (AZELEX) 20 % external cream--APPROVED  Approved Dose/Quantity:   Reference #:     Insurance Company: [employer] - Phone [phone] Fax [phone]  Expected CoPay:       CoPay Card Available:      Foundation Assistance Needed:    Which Pharmacy is filling the prescription (Not needed for infusion/clinic administered): [id number] - [place] [address] AT [address] [facility]  Pharmacy Notified: Yes  Patient Notified: Yes **Instructed pharmacy to notify patient when script is ready to /ship.**

## 2022-01-18 NOTE — NURSING NOTE
Clinic Administered Medication Documentation    Administrations This Visit     triamcinolone acetonide (KENALOG-10) injection 2.5 mg     Admin Date  01/18/2022 Action  Given Dose  2.5 mg Route  Intra-Lesional Site   Administered By  Terra Mar LPN    Ordering Provider: Collette Quezada MD    NDC: 5772-3719-19    Lot#: [id number]    : B-Glycosan [place] (PRIMARY CARE)    Patient Supplied?: No

## 2022-01-20 ENCOUNTER — TELEPHONE (OUTPATIENT)
Dept: DERMATOLOGY | Facility: CLINIC | Age: 37
End: 2022-01-20
Payer: COMMERCIAL

## 2022-01-20 DIAGNOSIS — L70.0 ACNE VULGARIS: ICD-10-CM

## 2022-01-20 DIAGNOSIS — L81.0 POST-INFLAMMATORY HYPERPIGMENTATION: ICD-10-CM

## 2022-01-20 NOTE — TELEPHONE ENCOUNTER
Azelex 20% cream is covered, but patient's portion is $760.  [facility] has it for >$500.  Will forwarding to Dr. Quezada to review and advise if OTC azelaic acid is appropriate or if there is another alternative she recommends.    Per 1/7/22 office visit note with Dr. Quezada:  Assessment & Plan:     # Acne vulgaris, not active today but needs prevention  - Continue azelaic acid cream once daily to shoulders, chest, and back. Refilled today. Reviewed to hold if irritating.  - Prescribed triamcinolone 0.1% BID x 3 weeks at a time for raised lesions for spot treatment. Review on thick lesions only due to steroid atrophy and hypopigmentation risk     # Prurigo nodularis, back, chest, and bilateral upper arms. Not itchy, pt picks  - Encouraged patient to stop picking at lesions.  - Recommended ILK injections today. See procedure note below. Patient is not pregnant. Patient is breast-feeding.  - Future considerations: N-acetylcysteine.     # PIH, upper back.  - Azelaic acid as above.  - Future considerations: hydroquinone      Procedures Performed:   Kenalog intralesional injection procedure note: After verbal consent and discussion of risks including but not limited to atrophy, pain, and bruising, time out was performed, the patient underwent positioning, 0.9 total cc of Kenalog 2.5 mg/cc was injected into 5 sites on the back, shoulders and chest.  The patient tolerated the procedure well and left the Dermatology clinic in good condition.       Follow-up: 12 weeks for ILK, 6 months sooner for concerns.

## 2022-01-21 RX ORDER — AZELAIC ACID 0.15 G/G
GEL TOPICAL
Qty: 50 G | Refills: 3 | Status: SHIPPED | OUTPATIENT
Start: 2022-01-21 | End: 2022-07-11

## 2022-01-21 NOTE — TELEPHONE ENCOUNTER
Medication pended per Dr. Quezada. Of note, 15% Azelaic acid is listed as gel not a cream. Pended for RN review.    Maribell Lassiter LPN on 1/21/2022 at 11:02 AM

## 2022-04-05 ENCOUNTER — OFFICE VISIT (OUTPATIENT)
Dept: DERMATOLOGY | Facility: CLINIC | Age: 37
End: 2022-04-05
Payer: COMMERCIAL

## 2022-04-05 DIAGNOSIS — L28.1 PRURIGO NODULARIS: Primary | ICD-10-CM

## 2022-04-05 DIAGNOSIS — L70.0 ACNE VULGARIS: ICD-10-CM

## 2022-04-05 PROCEDURE — 99213 OFFICE O/P EST LOW 20 MIN: CPT | Performed by: DERMATOLOGY

## 2022-04-05 RX ORDER — CLINDAMYCIN PHOSPHATE 10 UG/ML
LOTION TOPICAL 2 TIMES DAILY
Qty: 60 ML | Refills: 11 | Status: SHIPPED | OUTPATIENT
Start: 2022-04-05 | End: 2024-08-06

## 2022-04-05 RX ORDER — AZELAIC ACID 0.15 G/G
GEL TOPICAL
Qty: 50 G | Refills: 11 | Status: SHIPPED | OUTPATIENT
Start: 2022-04-05 | End: 2023-04-24

## 2022-04-05 NOTE — NURSING NOTE
Ebony Jo's goals for this visit include:   Chief Complaint   Patient presents with     Derm Problem     Prurigo nodularis, back, chest, and bilateral upper arms/ also, discuss azelaic acid     She requests these members of her care team be copied on today's visit information:     PCP: Desire Dumont    Referring Provider:  No referring provider defined for this encounter.    Breastfeeding Yes     Do you need any medication refills at today's visit? No  Kalyani Warner, CARISSA on 4/5/2022 at 4:17 PM

## 2022-04-05 NOTE — PROGRESS NOTES
[facility] Dermatology Note  Encounter Date: Apr 5, 2022  Office Visit     Dermatology Problem List:  Last skin check 1/7/22  1. Acne vulgaris with PIH  - Current tx: OTC azelaic acid  - previous tx: clindamycin 1% lotion.   - ILK 2.5mg/cc on 1/7/22     Social history: Has 2 children. Works at the  at [employer].    ____________________________________________    Assessment & Plan:    # Acne vulgaris, not active today but needs prevention. Azelaic acid not picked up at previous visit, due to cost. Will represcribe today to see if insurance will cover  - Start clindamycin lotion.  - Start azelaic acid daily     # Prurigo nodularis. Improvement compared to previous, one prurigo nodule remaining.   - Encouraged patient to stop picking at lesions.  - Continue triamcinolone 0.1% BID x 2 weeks at a time for raised lesions for spot treatment. Review on thick lesions only due to steroid atrophy and hypopigmentation risk  - Future considerations: N-acetylcysteine.     # PIH, upper back.  - Future considerations: hydroquinone once no longer breastfeeding      Procedures Performed:   None.    Follow-up: 3 month(s) virtually (telephone with photos) for hydroquinone if not BF, or earlier for new or changing lesions    Staff and Scribe:     Scribe Disclosure:   I, Jd Aguillon, am serving as a scribe to document services personally performed by this physician, Dr. Collette Quezada, based on data collection and the provider's statements to me.     Provider Disclosure:   The documentation recorded by the scribe accurately reflects the services I personally performed and the decisions made by me.    Collette Quezada MD    Department of Dermatology  [facility]: Phone: [phone], Fax:[phone]  [facility]: Phone: [phone], Fax:  [phone]      ____________________________________________    CC: Derm Problem (Prurigo nodularis, back, chest, and bilateral upper arms/ also, discuss azelaic acid)    HPI:  Ms. Ebony Jo is a(n) 36 year old female who presents today as a return patient for prurigo nodularis.    Last seen 1/7/22 for acne, prurigo nodularis and PIH. She was continued on azelaic acid cream and TAC for acne. She was encouraged to stop picking at lesions, and ILK performed on the back, chest and bilateral upper arms for prurigo nodularis. Instructed to use azelaic acid for the upper back for PIH.    Today, she has lesion on the back, chest and bilateral upper arms.    She would also like to discuss azelaic acid. She did not pick this up, as it was very expensive.    Patient is otherwise feeling well, without additional skin concerns.    Labs Reviewed:  N/A    Physical Exam:  Vitals: There were no vitals taken for this visit.  SKIN: Focused examination of back, upper extremities, face was performed.  Hyperpigmented patches on the back  Lichenified papule, left back  Hyperpigmented macules lower back  Hyperpigmented macules  - No other lesions of concern on areas examined.     Medications:  Current Outpatient Medications   Medication     acetaminophen (TYLENOL) 500 MG tablet     azelaic acid (AZELEX) 20 % external cream     azelaic acid (FINACIA) 15 % external gel     Prenatal Vit-Fe Fumarate-FA (PRENATAL MULTIVITAMIN PLUS IRON) 27-0.8 MG TABS per tablet     triamcinolone (KENALOG) 0.1 % external cream     No current facility-administered medications for this visit.      Past Medical History:   Patient Active Problem List   Diagnosis     CARDIOVASCULAR SCREENING; LDL GOAL LESS THAN 160     Migraine without aura     Hemifacial spasm, right     Acne vulgaris     Prurigo nodularis     Past Medical History:   Diagnosis Date     Hemifacial spasm, right 09/26/2013    stress-induced     Malaria 2009     Migraine without aura 9/26/2013      Seasonal allergies         CC No referring provider defined for this encounter. on close of this encounter.

## 2022-04-05 NOTE — LETTER
4/5/2022         RE: Ebony Jo  [address]  [place] [address]        Dear Colleague,    Thank you for referring your patient, Ebony Jo, to the [facility]. Please see a copy of my visit note below.    [facility] Dermatology Note  Encounter Date: Apr 5, 2022  Office Visit     Dermatology Problem List:  Last skin check 1/7/22  1. Acne vulgaris with PIH  - Current tx: OTC azelaic acid  - previous tx: clindamycin 1% lotion.   - ILK 2.5mg/cc on 1/7/22     Social history: Has 2 children. Works at the  at [employer].    ____________________________________________    Assessment & Plan:    # Acne vulgaris, not active today but needs prevention. Azelaic acid not picked up at previous visit, due to cost. Will represcribe today to see if insurance will cover  - Start clindamycin lotion.  - Start azelaic acid daily     # Prurigo nodularis. Improvement compared to previous, one prurigo nodule remaining.   - Encouraged patient to stop picking at lesions.  - Continue triamcinolone 0.1% BID x 2 weeks at a time for raised lesions for spot treatment. Review on thick lesions only due to steroid atrophy and hypopigmentation risk  - Future considerations: N-acetylcysteine.     # PIH, upper back.  - Future considerations: hydroquinone once no longer breastfeeding      Procedures Performed:   None.    Follow-up: 3 month(s) virtually (telephone with photos) for hydroquinone if not BF, or earlier for new or changing lesions    Staff and Scribe:     Scribe Disclosure:   I, Jd Aguillon, am serving as a scribe to document services personally performed by this physician, Dr. Collette Quezada, based on data collection and the provider's statements to me.     Provider Disclosure:   The documentation recorded by the scribe accurately reflects the services I personally performed and the decisions made by me.    Collette Quezada MD    Department  of Dermatology  [facility]: Phone: [phone], Fax:[phone]  [facility]: Phone: [phone], Fax: [phone]      ____________________________________________    CC: Derm Problem (Prurigo nodularis, back, chest, and bilateral upper arms/ also, discuss azelaic acid)    HPI:  Ms. Ebony Jo is a(n) 36 year old female who presents today as a return patient for prurigo nodularis.    Last seen 1/7/22 for acne, prurigo nodularis and PIH. She was continued on azelaic acid cream and TAC for acne. She was encouraged to stop picking at lesions, and ILK performed on the back, chest and bilateral upper arms for prurigo nodularis. Instructed to use azelaic acid for the upper back for PIH.    Today, she has lesion on the back, chest and bilateral upper arms.    She would also like to discuss azelaic acid. She did not pick this up, as it was very expensive.    Patient is otherwise feeling well, without additional skin concerns.    Labs Reviewed:  N/A    Physical Exam:  Vitals: There were no vitals taken for this visit.  SKIN: Focused examination of back, upper extremities, face was performed.  Hyperpigmented patches on the back  Lichenified papule, left back  Hyperpigmented macules lower back  Hyperpigmented macules  - No other lesions of concern on areas examined.     Medications:  Current Outpatient Medications   Medication     acetaminophen (TYLENOL) 500 MG tablet     azelaic acid (AZELEX) 20 % external cream     azelaic acid (FINACIA) 15 % external gel     Prenatal Vit-Fe Fumarate-FA (PRENATAL MULTIVITAMIN PLUS IRON) 27-0.8 MG TABS per tablet     triamcinolone (KENALOG) 0.1 % external cream     No current facility-administered medications for this visit.      Past Medical History:   Patient Active Problem List   Diagnosis     CARDIOVASCULAR SCREENING; LDL GOAL LESS THAN 160     Migraine without aura      Hemifacial spasm, right     Acne vulgaris     Prurigo nodularis     Past Medical History:   Diagnosis Date     Hemifacial spasm, right 09/26/2013    stress-induced     Malaria 2009     Migraine without aura 9/26/2013     Seasonal allergies         CC No referring provider defined for this encounter. on close of this encounter.      Again, thank you for allowing me to participate in the care of your patient.        Sincerely,        Collette Quezada MD

## 2022-04-05 NOTE — PATIENT INSTRUCTIONS
Apply clindamycin to the face    Apply azelaic acid to both the face and the body.    We can consider starting hydroquinone in the future for hyperpigmentation, once you are no longer breastfeeding.

## 2022-05-19 ENCOUNTER — IMMUNIZATION (OUTPATIENT)
Dept: NURSING | Facility: CLINIC | Age: 37
End: 2022-05-19
Payer: COMMERCIAL

## 2022-05-19 PROCEDURE — 0064A COVID-19,PF,MODERNA (18+ YRS BOOSTER .25ML): CPT

## 2022-05-19 PROCEDURE — 91306 COVID-19,PF,MODERNA (18+ YRS BOOSTER .25ML): CPT

## 2022-07-11 ENCOUNTER — VIRTUAL VISIT (OUTPATIENT)
Dept: DERMATOLOGY | Facility: CLINIC | Age: 37
End: 2022-07-11
Payer: COMMERCIAL

## 2022-07-11 DIAGNOSIS — L81.0 POST-INFLAMMATORY HYPERPIGMENTATION: Primary | ICD-10-CM

## 2022-07-11 PROCEDURE — 99213 OFFICE O/P EST LOW 20 MIN: CPT | Mod: 95 | Performed by: DERMATOLOGY

## 2022-07-11 RX ORDER — HYDROQUINONE 40 MG/G
CREAM TOPICAL
Qty: 30 G | Refills: 2 | Status: SHIPPED | OUTPATIENT
Start: 2022-07-11 | End: 2022-10-10

## 2022-07-11 NOTE — LETTER
7/11/2022         RE: Ebony Jo  [address]  [address]  [place] [address]        Dear Colleague,    Thank you for referring your patient, Ebony Jo, to the [facility]. Please see a copy of my visit note below.    [facility] Dermatology Note  Encounter Date: Jul 11, 2022  Store-and-Forward and Telephone ([phone] ). Location of teledermatologist: [facility].  Start time: 12:45pm. End time: 12:53pm.    Dermatology Problem List:  Last skin check 1/7/22  1. Acne vulgaris with PIH  - Current tx: OTC azelaic acid  - previous tx: clindamycin 1% lotion.   - ILK 2.5mg/cc on 1/7/22     Social history: Has 2 children. Works at the  at [employer]     ____________________________________________     Assessment & Plan:     # Acne vulgaris, not active today but needs prevention. Azelaic acid not picked up at previous visit, due to cost. Will represcribe today to see if insurance will cover  - clindamycin and azelaic acid     # PIH- no photo available  - hold triamcinolone twice daily for 2 weeks  -apply 1-2 times on dark spots for up to 3 months ,but beware that this cream can lighten the skin too much. It can create a ring. Take 3 months break then repeat, okay to use the azelaic acid also if no irritation, stop if you become pregnant  -if you get irritated call the clinic, we will add hydrocortisone  Avoid tan  - Future considerations: N-acetylcysteine.        Procedures Performed:    None    Follow-up: 3 months photos and phone, we welcome a photo this week    Staff:     Collette Quezada MD    Department of Dermatology  [facility]: Phone: [phone], Fax:[phone]  [facility]: Phone: [phone], Fax: [phone]      ____________________________________________    CC: Follow Up Sandy, is here  for a follow up appt )    HPI:  Ms. Ebony Jo is a(n) 37 year old female who presents today as a return patient for itchy bumps. She reports the prurigo nodules are resolved.  She  Has azelaic acid. She feels it is working. The areas are not as pronounced. Slowly resolving. Stopped 2 months ago breastfeeding.   Patient is otherwise feeling well, without additional skin concerns.    Labs Reviewed:  NA    Physical Exam:  Vitals: There were no vitals taken for this visit.  pending  Medications:  Current Outpatient Medications   Medication     acetaminophen (TYLENOL) 500 MG tablet     azelaic acid (AZELEX) 20 % external cream     azelaic acid (FINACIA) 15 % external gel     azelaic acid (FINACIA) 15 % external gel     clindamycin (CLEOCIN T) 1 % external lotion     Prenatal Vit-Fe Fumarate-FA (PRENATAL MULTIVITAMIN PLUS IRON) 27-0.8 MG TABS per tablet     triamcinolone (KENALOG) 0.1 % external cream     No current facility-administered medications for this visit.      Past Medical/Surgical History:   Patient Active Problem List   Diagnosis     CARDIOVASCULAR SCREENING; LDL GOAL LESS THAN 160     Migraine without aura     Hemifacial spasm, right     Acne vulgaris     Prurigo nodularis     Past Medical History:   Diagnosis Date     Hemifacial spasm, right 09/26/2013    stress-induced     Malaria 2009     Migraine without aura 9/26/2013     Seasonal allergies        CC No referring provider defined for this encounter. on close of this encounter.      Again, thank you for allowing me to participate in the care of your patient.        Sincerely,        Collette Quezada MD

## 2022-07-11 NOTE — PROGRESS NOTES
[facility] Dermatology Note  Encounter Date: Jul 11, 2022  Store-and-Forward and Telephone ([phone] ). Location of teledermatologist: [facility].  Start time: 12:45pm. End time: 12:53pm.    Dermatology Problem List:  Last skin check 1/7/22  1. Acne vulgaris with PIH  - Current tx: OTC azelaic acid  - previous tx: clindamycin 1% lotion.   - ILK 2.5mg/cc on 1/7/22     Social history: Has 2 children. Works at the  at [employer].     ____________________________________________     Assessment & Plan:     # Acne vulgaris, not active today but needs prevention. Azelaic acid not picked up at previous visit, due to cost. Will represcribe today to see if insurance will cover  - clindamycin and azelaic acid     # PIH- no photo available  - hold triamcinolone twice daily for 2 weeks  -apply 1-2 times on dark spots for up to 3 months ,but beware that this cream can lighten the skin too much. It can create a ring. Take 3 months break then repeat, okay to use the azelaic acid also if no irritation, stop if you become pregnant  -if you get irritated call the clinic, we will add hydrocortisone  Avoid tan  - Future considerations: N-acetylcysteine.        Procedures Performed:    None    Follow-up: 3 months photos and phone, we welcome a photo this week    Staff:     Collette Quezada MD    Department of Dermatology  [facility]: Phone: [phone], Fax:[phone]  [facility]: Phone: [phone], Fax: [phone]      ____________________________________________    CC: Follow Up (Ebony, is here for a follow up appt )    HPI:  Ms. Ebony Jo is a(n) 37 year old female who presents today as a return patient for itchy bumps. She reports the prurigo nodules are resolved.  She  Has azelaic acid. She feels it is working. The areas are not as  pronounced. Slowly resolving. Stopped 2 months ago breastfeeding.   Patient is otherwise feeling well, without additional skin concerns.    Labs Reviewed:  NA    Physical Exam:  Vitals: There were no vitals taken for this visit.  pending  Medications:  Current Outpatient Medications   Medication     acetaminophen (TYLENOL) 500 MG tablet     azelaic acid (AZELEX) 20 % external cream     azelaic acid (FINACIA) 15 % external gel     azelaic acid (FINACIA) 15 % external gel     clindamycin (CLEOCIN T) 1 % external lotion     Prenatal Vit-Fe Fumarate-FA (PRENATAL MULTIVITAMIN PLUS IRON) 27-0.8 MG TABS per tablet     triamcinolone (KENALOG) 0.1 % external cream     No current facility-administered medications for this visit.      Past Medical/Surgical History:   Patient Active Problem List   Diagnosis     CARDIOVASCULAR SCREENING; LDL GOAL LESS THAN 160     Migraine without aura     Hemifacial spasm, right     Acne vulgaris     Prurigo nodularis     Past Medical History:   Diagnosis Date     Hemifacial spasm, right 09/26/2013    stress-induced     Malaria 2009     Migraine without aura 9/26/2013     Seasonal allergies        CC No referring provider defined for this encounter. on close of this encounter.

## 2022-07-11 NOTE — NURSING NOTE
Chief Complaint   Patient presents with     Follow Up     Ebony, is here for a follow up appt    Teledermatology Nurse Call Patients:     Are you in the [facility] at the time of the encounter? yes    Today's visit will be billed to you and your insurance.    A teledermatology visit is not as thorough as an in-person visit and the quality of the photograph sent may not be of the same quality as that taken by the dermatology clinic.    Clifford Weston VF

## 2022-07-11 NOTE — PATIENT INSTRUCTIONS
"[facility] Dermatology Visit    Thank you for allowing us to participate in your care. Your findings, instructions and follow-up plan are as follows:     Hydroquinone is a cream that lightens skin color:   -stop this medication for a few days if you have irritation, then restart when the irritation resolves. Call the clinic if the irritation does not resolve within a few days or if the reaction is severe   -Use this medication for up to 1-3 months  -This medication should be used with sunscreen, as the sun can darken the skin  -Stop tretinoin, tazorac (tazarotene), differin (adapalene), and any retinols when you are using hydroquinone  -Do not use this product in combination with resorcinol containing skin products  -Do not use this product if you are pregnant or breastfeeding.  -Using this cream when your skin is irritated or using it too long can result in darkening of the skin             Hydroquinone: Patient drug information  Access [employer] for additional drug information, tools, and databases.  Copyright 1206-6317 [employer] All rights reserved.  Contributor Disclosures    (For additional information see \"Hydroquinone: Drug information\")    You must carefully read the \"Consumer Information Use and Disclaimer\" below in order to understand and correctly use this information.    Brand Names: US  AMBI Fade [OTC] [DSC]; Maureen; EpiQuin Micro [DSC]; Esoterica Daytime [OTC] [DSC]; Esoterica Facial [OTC]; Esoterica Fade Nighttime [OTC] [DSC]; Esoterica Sensitive Skin [OTC]; Hydroquinone Time Release [DSC]; Melpaque HP; NeoStrata HQ Skin Lightening [OTC] [DSC]; Remergent HQ [DSC]; TL Hydroquinone [DSC]  Brand Names: Britney  Corrector 4 [DSC]; Drula Fade Superforte Medicate [DSC]; Erfa Hydroquinone; Lustra-AF [DSC]; Nuderm Sunfader [DSC]; Seequin 4 IDs [DSC]    What is this drug used for?   It is used to lighten the skin where there are changes in color.  What do I need to tell my doctor " BEFORE I take this drug?   If you are allergic to this drug; any part of this drug; or any other drugs, foods, or substances. Tell your doctor about the allergy and what signs you had.  This drug may interact with other drugs or health problems.  Tell your doctor and pharmacist about all of your drugs (prescription or OTC, natural products, vitamins) and health problems. You must check to make sure that it is safe for you to take this drug with all of your drugs and health problems. Do not start, stop, or change the dose of any drug without checking with your doctor.  What are some things I need to know or do while I take this drug?   Tell all of your health care providers that you take this drug. This includes your doctors, nurses, pharmacists, and dentists.   After stopping this drug, some of the color change may come back.   If you have a sulfite allergy, talk with your doctor.   This drug may cause harm if swallowed. If this drug is swallowed, call a doctor or poison control center right away.   Avoid sun, sunlamps, and tanning beds. Use sunscreen and wear clothing and eyewear that protects you from the sun.   Tell your doctor if you are pregnant, plan on getting pregnant, or are breast-feeding. You will need to talk about the benefits and risks to you and the baby.  What are some side effects that I need to call my doctor about right away?  WARNING/CAUTION: Even though it may be rare, some people may have very bad and sometimes deadly side effects when taking a drug. Tell your doctor or get medical help right away if you have any of the following signs or symptoms that may be related to a very bad side effect:   Signs of an allergic reaction, like rash; hives; itching; red, swollen, blistered, or peeling skin with or without fever; wheezing; tightness in the chest or throat; trouble breathing, swallowing, or talking; unusual hoarseness; or swelling of the mouth, face, lips, tongue, or throat.   Change in color  of skin to blue-black.   Blisters or sores.      What are some other side effects of this drug?  All drugs may cause side effects. However, many people have no side effects or only have minor side effects. Call your doctor or get medical help if any of these side effects or any other side effects bother you or do not go away:   Dry skin.   Stinging.   Redness.   Skin irritation.  These are not all of the side effects that may occur. If you have questions about side effects, call your doctor. Call your doctor for medical advice about side effects.  You may report side effects to your national health agency.  How is this drug best taken?  Use this drug as ordered by your doctor. Read all information given to you. Follow all instructions closely.   Do not take this drug by mouth. Use on your skin only. Keep out of your mouth, nose, and eyes (may burn).   Wash your hands before and after use. If your hand is the treated area, do not wash your hand after use.   Clean affected part before use. Make sure to dry well.   Put a thin layer on the affected skin and rub in gently.   Practice good skin care and avoid the sun.   Do not use coverings (bandages, dressings, make-up) unless told to do so by the doctor.   Do not use on irritated skin.      What do I do if I miss a dose?   Put on a missed dose as soon as you think about it.   If it is close to the time for your next dose, skip the missed dose and go back to your normal time.   Do not put on 2 doses at the same time or extra doses.  How do I store and/or throw out this drug?   Store at room temperature in a dry place. Do not store in a bathroom.   Keep all drugs in a safe place. Keep all drugs out of the reach of children and pets.   Throw away unused or  drugs. Do not flush down a toilet or pour down a drain unless you are told to do so. Check with your pharmacist if you have questions about the best way to throw out drugs. There may be drug take-back programs in  your area.      General drug facts   If your symptoms or health problems do not get better or if they become worse, call your doctor.   Do not share your drugs with others and do not take anyone else's drugs.   Some drugs may have another patient information leaflet. If you have any questions about this drug, please talk with your doctor, nurse, pharmacist, or other health care provider.   If you think there has been an overdose, call your poison control center or get medical care right away. Be ready to tell or show what was taken, how much, and when it happened.  Last Reviewed Date  2020-09-21      Consumer Information Use and Disclaimer  This generalized information is a limited summary of diagnosis, treatment, and/or medication information. It is not meant to be comprehensive and should be used as a tool to help the user understand and/or assess potential diagnostic and treatment options. It does NOT include all information about conditions, treatments, medications, side effects, or risks that may apply to a specific patient. It is not intended to be medical advice or a substitute for the medical advice, diagnosis, or treatment of a health care provider based on the health care provider's examination and assessment of a patient's specific and unique circumstances. Patients must speak with a health care provider for complete information about their health, medical questions, and treatment options, including any risks or benefits regarding use of medications. This information does not endorse any treatments or medications as safe, effective, or approved for treating a specific patient. UpToDate, Inc. and its affiliates disclaim any warranty or liability relating to this information or the use thereof. The use of this information is governed by the Terms of Use, available at https://www.woltersDewMobileuwer.com/en/know/clinical-effectiveness-terms.    2022 UpToDate, Inc. and its affiliates and/or licensors. All rights  reserved.        When should I call my doctor?  If you are worsening or not improving, please, contact us or seek urgent care as noted below.     Who should I call with questions (adults)?  [facility] (adult and pediatric): [phone]  [facility] (adult): [phone]  For urgent needs outside of business hours call the [facility] at [phone] and ask for the dermatology resident on call  If this is a medical emergency and you are unable to reach an ER, Call [phone]    Who should I call with questions (pediatric)?  [facility] Pediatric Dermatology  Dr. Radha Julian, Dr. Daryl Hughes, Dr. Raven Vázquez, [address] Dr. Shari Starkey & Dr. Dio Correa  Non Urgent  Nurse Triage Line; [phone]- Zehra and Antonella CASTRO Care Coordinators   [place] (/Complex ) [phone]    If you need a prescription refill, please contact your pharmacy. Refills are approved or denied by our physicians during normal business hours, Monday through Fridays  Per office policy, refills will not be granted if you have not been seen within the past year (or sooner depending on your child's condition).    Scheduling Information:  Pediatric Appointment Scheduling and Call Center [phone]  Radiology Scheduling- [phone]  Sedation Unit Scheduling- [phone]  [place] Scheduling- General [phone]; Pediatric Dermatology [phone]  Main  Services: [phone]  Greek: [phone]  Solomon Islander: [phone]  Hmong/Luther/Alan: [phone]  Preadmission Nursing Department Fax Number: [phone] (fax all pre-operative paperwork to this number)    For urgent matters arising during evenings, weekends, or holidays that cannot wait for normal business hours please call [phone] and ask for the dermatology resident on call to be paged.

## 2022-07-13 ENCOUNTER — TELEPHONE (OUTPATIENT)
Dept: DERMATOLOGY | Facility: CLINIC | Age: 37
End: 2022-07-13

## 2022-07-13 NOTE — TELEPHONE ENCOUNTER
Attempted to reach patient to schedule follow up in the Dermatology Clinic.  No answer,  LM on VM to call office    Schedule with Dr. Collette Quezada on or around 10/11/22 via telephone.

## 2022-07-18 ENCOUNTER — OFFICE VISIT (OUTPATIENT)
Dept: FAMILY MEDICINE | Facility: CLINIC | Age: 37
End: 2022-07-18
Payer: COMMERCIAL

## 2022-07-18 VITALS
TEMPERATURE: 97.6 F | HEIGHT: 65 IN | HEART RATE: 80 BPM | RESPIRATION RATE: 20 BRPM | DIASTOLIC BLOOD PRESSURE: 70 MMHG | WEIGHT: 195.4 LBS | BODY MASS INDEX: 32.55 KG/M2 | OXYGEN SATURATION: 100 % | SYSTOLIC BLOOD PRESSURE: 102 MMHG

## 2022-07-18 DIAGNOSIS — Z12.4 CERVICAL CANCER SCREENING: ICD-10-CM

## 2022-07-18 DIAGNOSIS — Z00.00 ROUTINE GENERAL MEDICAL EXAMINATION AT A HEALTH CARE FACILITY: Primary | ICD-10-CM

## 2022-07-18 DIAGNOSIS — Z13.1 SCREENING FOR DIABETES MELLITUS: ICD-10-CM

## 2022-07-18 LAB — HBA1C MFR BLD: 5.8 % (ref 0–5.6)

## 2022-07-18 PROCEDURE — 83036 HEMOGLOBIN GLYCOSYLATED A1C: CPT | Performed by: FAMILY MEDICINE

## 2022-07-18 PROCEDURE — 36415 COLL VENOUS BLD VENIPUNCTURE: CPT | Performed by: FAMILY MEDICINE

## 2022-07-18 PROCEDURE — 87624 HPV HI-RISK TYP POOLED RSLT: CPT | Performed by: FAMILY MEDICINE

## 2022-07-18 PROCEDURE — 99395 PREV VISIT EST AGE 18-39: CPT | Performed by: FAMILY MEDICINE

## 2022-07-18 PROCEDURE — G0145 SCR C/V CYTO,THINLAYER,RESCR: HCPCS | Performed by: FAMILY MEDICINE

## 2022-07-18 ASSESSMENT — ENCOUNTER SYMPTOMS
ABDOMINAL PAIN: 0
DIARRHEA: 0
DYSURIA: 0
WEAKNESS: 0
PALPITATIONS: 0
PARESTHESIAS: 0
FREQUENCY: 0
JOINT SWELLING: 0
HEMATURIA: 0
EYE PAIN: 0
SHORTNESS OF BREATH: 0
HEMATOCHEZIA: 0
HEADACHES: 0
ARTHRALGIAS: 1
FEVER: 0
CHILLS: 0
NAUSEA: 0
SORE THROAT: 0
DIZZINESS: 0
BREAST MASS: 0
COUGH: 0
CONSTIPATION: 0
NERVOUS/ANXIOUS: 0
MYALGIAS: 0
HEARTBURN: 0

## 2022-07-18 ASSESSMENT — PAIN SCALES - GENERAL: PAINLEVEL: NO PAIN (0)

## 2022-07-18 NOTE — PROGRESS NOTES
SUBJECTIVE:   CC: Ebony Jo is an 37 year old woman who presents for preventive health visit.       Patient has been advised of split billing requirements and indicates understanding: Yes  Healthy Habits:     Getting at least 3 servings of Calcium per day:  Yes    Bi-annual eye exam:  NO    Dental care twice a year:  Yes    Sleep apnea or symptoms of sleep apnea:  Daytime drowsiness    Diet:  Regular (no restrictions)    Frequency of exercise:  None    Taking medications regularly:  Yes    Medication side effects:  None    PHQ-2 Total Score: 2    Additional concerns today:  Yes        Today's PHQ-2 Score:   PHQ-2 ( 1999 Pfizer) 7/18/2022   Q1: Little interest or pleasure in doing things 2   Q2: Feeling down, depressed or hopeless 0   PHQ-2 Score 2   PHQ-2 Total Score (12-17 Years)- Positive if 3 or more points; Administer PHQ-A if positive -   Q1: Little interest or pleasure in doing things More than half the days   Q2: Feeling down, depressed or hopeless Not at all   PHQ-2 Score 2       Abuse: Current or Past (Physical, Sexual or Emotional) - No  Do you feel safe in your environment? Yes    Have you ever done Advance Care Planning? (For example, a Health Directive, POLST, or a discussion with a medical provider or your loved ones about your wishes): No, advance care planning information given to patient to review.  Patient declined advance care planning discussion at this time.    Social History     Tobacco Use     Smoking status: Never Smoker     Smokeless tobacco: Never Used   Substance Use Topics     Alcohol use: No     Alcohol/week: 0.0 standard drinks     Comment:  0-2  wine  glasses monthly, not in PG     If you drink alcohol do you typically have >3 drinks per day or >7 drinks per week? No    Alcohol Use 7/18/2022   Prescreen: >3 drinks/day or >7 drinks/week? No   Prescreen: >3 drinks/day or >7 drinks/week? -       Reviewed orders with patient.  Reviewed health maintenance and updated orders  accordingly - Yes  Labs reviewed in EPIC    Breast Cancer Screening:    Breast CA Risk Assessment (FHS-7) 7/18/2022   Do you have a family history of breast, colon, or ovarian cancer? No / Unknown         Patient under 40 years of age: Routine Mammogram Screening not recommended.   Pertinent mammograms are reviewed under the imaging tab.    History of abnormal Pap smear: NO - age 30-65 PAP every 5 years with negative HPV co-testing recommended  PAP / HPV Latest Ref Rng & Units 3/20/2019 1/27/2016 6/7/2012   PAP (Historical) - NIL NIL NIL   HPV16 NEG:Negative Negative - -   HPV18 NEG:Negative Negative - -   HRHPV NEG:Negative Negative - -     Reviewed and updated as needed this visit by clinical staff   Tobacco  Allergies  Meds  Problems  Med Hx  Surg Hx  Fam Hx  Soc   Hx          Reviewed and updated as needed this visit by Provider   Tobacco  Allergies  Meds  Problems  Med Hx  Surg Hx  Fam Hx               Review of Systems   Constitutional: Negative for chills and fever.   HENT: Negative for congestion, ear pain, hearing loss and sore throat.    Eyes: Positive for visual disturbance. Negative for pain.   Respiratory: Negative for cough and shortness of breath.    Cardiovascular: Negative for chest pain, palpitations and peripheral edema.   Gastrointestinal: Negative for abdominal pain, constipation, diarrhea, heartburn, hematochezia and nausea.   Breasts:  Negative for tenderness, breast mass and discharge.   Genitourinary: Negative for dysuria, frequency, genital sores, hematuria, pelvic pain, urgency, vaginal bleeding and vaginal discharge.   Musculoskeletal: Positive for arthralgias. Negative for joint swelling and myalgias.   Skin: Negative for rash.   Neurological: Negative for dizziness, weakness, headaches and paresthesias.   Psychiatric/Behavioral: Negative for mood changes. The patient is not nervous/anxious.           OBJECTIVE:   /70 (BP Location: Left arm, Patient Position: Sitting,  "Cuff Size: Adult Large)   Pulse 80   Temp 97.6  F (36.4  C) (Tympanic)   Resp 20   Ht 1.645 m (5' 4.75\")   Wt 88.6 kg (195 lb 6.4 oz)   LMP 11/17/2020 (Exact Date)   SpO2 100%   BMI 32.77 kg/m    Physical Exam  GENERAL: healthy, alert and no distress  EYES: Eyes grossly normal to inspection, PERRL and conjunctivae and sclerae normal  HENT: ear canals and TM's normal, nose and mouth without ulcers or lesions  NECK: no adenopathy, no asymmetry, masses, or scars and thyroid normal to palpation  RESP: lungs clear to auscultation - no rales, rhonchi or wheezes  BREAST: normal without masses, tenderness or nipple discharge and no palpable axillary masses or adenopathy  CV: regular rate and rhythm, normal S1 S2, no S3 or S4, no murmur, click or rub, no peripheral edema and peripheral pulses strong  ABDOMEN: soft, nontender, no hepatosplenomegaly, no masses and bowel sounds normal   (female): normal female external genitalia, normal urethral meatus, vaginal mucosa pink, moist, well rugated, and normal cervix/adnexa/uterus without masses or discharge  MS: no gross musculoskeletal defects noted, no edema  SKIN: no suspicious lesions or rashes  NEURO: Normal strength and tone, mentation intact and speech normal  PSYCH: mentation appears normal, affect normal/bright    Diagnostic Test Results:  Labs reviewed in Epic    ASSESSMENT/PLAN:   (Z00.00) Routine general medical examination at a health care facility  (primary encounter diagnosis)  Comment:   Plan: Routine preventive discussed.  Also discussed better sleep    (Z12.4) Cervical cancer screening  Comment:   Plan: Pap Screen with HPV - recommended age 30 - 65         years            (Z13.1) Screening for diabetes mellitus  Comment:   Plan: Hemoglobin A1c                  COUNSELING:  Reviewed preventive health counseling, as reflected in patient instructions    Estimated body mass index is 32.77 kg/m  as calculated from the following:    Height as of this " "encounter: 1.645 m (5' 4.75\").    Weight as of this encounter: 88.6 kg (195 lb 6.4 oz).    Weight management plan: Discussed healthy diet and exercise guidelines    She reports that she has never smoked. She has never used smokeless tobacco.      Counseling Resources:  ATP IV Guidelines  Pooled Cohorts Equation Calculator  Breast Cancer Risk Calculator  BRCA-Related Cancer Risk Assessment: FHS-7 Tool  FRAX Risk Assessment  ICSI Preventive Guidelines  Dietary Guidelines for Americans, 2010  USDA's MyPlate  ASA Prophylaxis  Lung CA Screening    Desire Waldron MD  [facility]  "

## 2022-07-20 NOTE — RESULT ENCOUNTER NOTE
Ms. Jo,    Your A1c was above my goal for you. A normal level is below 6. This suggests that you have a condition call pre-diabetes which will lead to diabetes if you don't make some drastic changes. In the meantime, a healthy diet high in lean protein, whole grain carbohydrates and healthy fats such as olive oil or canola will help mainain a healthy blood sugar. Try to eliminate sugar and get better sleep.  We should recheck this in 6 months.    Please contact the clinic if you have additional questions.  Thank you.    Sincerely,    Desire Waldron MD

## 2022-07-21 LAB
BKR LAB AP GYN ADEQUACY: NORMAL
BKR LAB AP GYN INTERPRETATION: NORMAL
BKR LAB AP HPV REFLEX: NORMAL
BKR LAB AP PREVIOUS ABNORMAL: NORMAL
PATH REPORT.COMMENTS IMP SPEC: NORMAL
PATH REPORT.COMMENTS IMP SPEC: NORMAL
PATH REPORT.RELEVANT HX SPEC: NORMAL

## 2022-07-25 LAB
HUMAN PAPILLOMA VIRUS 16 DNA: NEGATIVE
HUMAN PAPILLOMA VIRUS 18 DNA: NEGATIVE
HUMAN PAPILLOMA VIRUS FINAL DIAGNOSIS: NORMAL
HUMAN PAPILLOMA VIRUS OTHER HR: NEGATIVE

## 2022-09-03 ENCOUNTER — HEALTH MAINTENANCE LETTER (OUTPATIENT)
Age: 37
End: 2022-09-03

## 2022-09-29 ENCOUNTER — OFFICE VISIT (OUTPATIENT)
Dept: FAMILY MEDICINE | Facility: CLINIC | Age: 37
End: 2022-09-29
Payer: COMMERCIAL

## 2022-09-29 VITALS — BODY MASS INDEX: 33.2 KG/M2 | WEIGHT: 198 LBS

## 2022-09-29 DIAGNOSIS — Z71.84 TRAVEL ADVICE ENCOUNTER: Primary | ICD-10-CM

## 2022-09-29 PROCEDURE — 90632 HEPA VACCINE ADULT IM: CPT | Mod: GA | Performed by: NURSE PRACTITIONER

## 2022-09-29 PROCEDURE — 90691 TYPHOID VACCINE IM: CPT | Mod: GA | Performed by: NURSE PRACTITIONER

## 2022-09-29 PROCEDURE — 90717 YELLOW FEVER VACCINE SUBQ: CPT | Mod: GA | Performed by: NURSE PRACTITIONER

## 2022-09-29 PROCEDURE — 90471 IMMUNIZATION ADMIN: CPT | Mod: GA | Performed by: NURSE PRACTITIONER

## 2022-09-29 PROCEDURE — 99402 PREV MED CNSL INDIV APPRX 30: CPT | Mod: GA | Performed by: NURSE PRACTITIONER

## 2022-09-29 PROCEDURE — 90472 IMMUNIZATION ADMIN EACH ADD: CPT | Mod: GA | Performed by: NURSE PRACTITIONER

## 2022-09-29 RX ORDER — AZITHROMYCIN 500 MG/1
500 TABLET, FILM COATED ORAL DAILY
Qty: 3 TABLET | Refills: 0 | Status: SHIPPED | OUTPATIENT
Start: 2022-09-29 | End: 2022-10-02

## 2022-09-29 NOTE — PROGRESS NOTES
Nurse Note      Itinerary:  [facility]       Departure Date: 11/22/22      Return Date: 12/07/22      Length of Trip 2 weeks       Reason for Travel: Visiting friends and relatives           Urban or rural: both      Accommodations: Family home        IMMUNIZATION HISTORY  Have you received any immunizations within the past 4 weeks?  No  Have you ever fainted from having your blood drawn or from an injection?  No  Have you ever had a fever reaction to vaccination?  No  Have you ever had any bad reaction or side effect from any vaccination?  No  Have you ever had hepatitis A or B vaccine?  Yes  Do you live (or work closely) with anyone who has AIDS, an AIDS-like condition, any other immune disorder or who is on chemotherapy for cancer?  No  Do you have a family history of immunodeficiency?  No  Have you received any injection of immune globulin or any blood products during the past 12 months?  No    Patient roomed by KERVIN Ferguson    Ebony Jo is a 37 year old female seen today with children for counsultation for international travel.   Patient will be departing in  7 week(s) and  traveling with child(ninfa).      Patient itinerary :  will be in the [facility] which risk for Malaria and Yellow Fever. exposure.      Patient's activities will include visiting friends and relatives.    Patient's country of birth is [facility]     Special medical concerns: seasonal allergies   Pre-travel questionnaire was completed by patient and reviewed by provider.     Vitals: LMP 11/17/2020 (Exact Date)   BMI= There is no height or weight on file to calculate BMI.    EXAM:  General:  Well-nourished, well-developed in no acute distress.  Appears to be stated age, interacts appropriately and expresses understanding of information given to patient.    Current Outpatient Medications   Medication Sig Dispense Refill     acetaminophen (TYLENOL) 500 MG tablet Take 500-1,000 mg by mouth every 6 hours as needed        azelaic acid (FINACIA) 15 % external gel Apply once daily 50 g 11     clindamycin (CLEOCIN T) 1 % external lotion Apply topically 2 times daily 60 mL 11     hydroquinone (MAYRA) 4 % external cream Apply 1-2 times daily to dark areas for up to 3 months. 30 g 2     Prenatal Vit-Fe Fumarate-FA (PRENATAL MULTIVITAMIN PLUS IRON) 27-0.8 MG TABS per tablet Take 1 tablet by mouth daily 100 tablet 3     triamcinolone (KENALOG) 0.1 % external cream Use twice daily for up to 3 weeks on itchy bumps as spot treatment. Not for face 30 g 0     Patient Active Problem List   Diagnosis     CARDIOVASCULAR SCREENING; LDL GOAL LESS THAN 160     Migraine without aura     Hemifacial spasm, right     Acne vulgaris     Prurigo nodularis     No Known Allergies    NOT BREASTFEEDING  Immunizations discussed include: Works in patient care (CNA)   Covid 19: Up to date  Hepatitis A:  Ordered for today  Hepatitis B: Up to date  Influenza: deferred  Typhoid: Ordered/given today, risks, benefits and side effects reviewed  Rabies: Declined  reviewed managment of a animal bite or scratch (washing wound, seek medical care within 24 hours for post exposure prophylaxis )  Yellow Fever: Yellow Fever ordered/given today - side effects, precautions, allergies, risks discussed. Patient expressed understanding.  Maori Encephalitis: Not indicated  Meningococcus: Declined  Low risk season and short stay  Tetanus/Diphtheria: Up to date  Measles/Mumps/Rubella: Up to date  Cholera: Not needed  Polio: Not indicated  Pneumococcal: Under age of 65  Varicella: Up to date  Shingrix: Not indicated  HPV:  Not indicated     TB: short stay    Altitude Exposure on this trip: no  Past tolerance to Altitude: na    ASSESSMENT/PLAN:  Ebony was seen today for travel clinic.    Diagnoses and all orders for this visit:    Travel advice encounter  -     YELLOW FEVER IMMUNIZATN,LIVE,SUBCUT  -     TYPHOID VACCINE, IM  -     HEPATITIS A VACCINE (ADULT)  -     azithromycin  (ZITHROMAX) 500 MG tablet; Take 1 tablet (500 mg) by mouth daily for 3 doses Take 1 tablet a day for up to 3 days for severe diarrhea      I have reviewed general recommendations for safe travel   including: food/water precautions, insect precautions, safer sex   practices given high prevalence of Zika, HIV and other STDs,   roadway safety. Educational materials and [employer] report provided.    Malaraia prophylaxis recommended: declined, states can be obtained in country  Symptomatic treatment for traveler's diarrhea: azithromycin  Altitude illness prevention and treatment: NA      Evacuation insurance advised and resources were provided to patient.    Total visit time 30 minutes  with over 50% of time spent counseling patient as detailed above. ( 45 minutes late for arrival)    Evelin Carranza CNP  Certificate in Travel Health

## 2022-09-29 NOTE — PATIENT INSTRUCTIONS
Thank you for visiting the [facility] : [phone]  Today September 29, 2022 you received the    Hepatitis A Vaccine - Please return on 3/28/23 or later for your 2nd and final dose.    Yellow Fever (YF)    Typhoid - injectable. This vaccine is valid for two years.     Follow up vaccine appointments can be made as a NURSE ONLY visit at the Travel Clinic, (BE PREPARED TO WAIT, ) or at designated [place] [facility].    If you are receiving the Rabies vaccines series, it is important that you follow the exact schedule ordered.     Pre-travel     We recommend that you purchase Medical Evacuation Insurance prior to your departure.  Https://wwwnc.cdc.gov/travel/page/insurance    Knott your travel plans with the [place] Department of State through STEP ( Smart Traveler Enrollment Program ) https://step.state.gov.  STEP is a free service to allow U[place] citizens and nationals traveling and living abroad to enroll their trip with the nearest U[place] Embassy or Consulate.    Animal Exposure: Avoid all mammals even if they look healthy.  If there is a bite, scratch or even a lick, wash area immediately with soap and water for 15 minutes and seek medical care within 24 hours for evaluation of Rabies post exposure treatment.  Contact your Medical Evacuation Insurance.    COVID 19 (Sars Cov2) prevention strategies  Physical distancing: Maintain 6 foot (2m) from others.              Avoid large gatherings and public transportation.   Avoid indoor shopping malls, theaters and restaurants   Practice consistent mask wearing covering the nose, mouth and underneath the chin when unable to maintain 6 foot distance from others.  Hand washing: frequent, thorough handwashing with soap and water for 20 seconds (or using a hand  containing 60% alcohol)   Avoid touching face, nose, eyes, mouth unless you have done appropriate hand washing as above.   Clean high touch surfaces with approved  disinfectant against Covid 19  (70% Ethanol ) or a bleach solution (add 20 mL (4 teaspoons) of bleach to 1 L (1 quart) of water;)  Be careful not to breath or touch bleach.      Travel Covid 19 Testing:  updated 12/06/2021  International travelers: Pre-travel: diagnostic testing (antigen or PCR) may be required for entry:  See country specific Embassy websites or airline websites.    Post travel: CDC recommends getting tested 3-5 days after your trip     COVID-19 testing scheduling number for pre-travel through [facility]  [phone] (Must have an order). Available 24 hours a day.  You can also schedule through My Chart.     Post-travel illness:  Contact your provider or [facility] if you develop a fever, rash, cough, diarrhea or other symptoms for up to 1 year after travel.  Inform your healthcare provider when and where you traveled to.    Please call the [facility] with any questions [phone]  Or send your provider a 'My Chart' note.

## 2022-10-10 ENCOUNTER — VIRTUAL VISIT (OUTPATIENT)
Dept: DERMATOLOGY | Facility: CLINIC | Age: 37
End: 2022-10-10
Payer: COMMERCIAL

## 2022-10-10 DIAGNOSIS — L28.1 PRURIGO NODULARIS: ICD-10-CM

## 2022-10-10 PROCEDURE — 99214 OFFICE O/P EST MOD 30 MIN: CPT | Mod: TEL | Performed by: DERMATOLOGY

## 2022-10-10 RX ORDER — TRIAMCINOLONE ACETONIDE 1 MG/G
CREAM TOPICAL
Qty: 30 G | Refills: 0 | Status: SHIPPED | OUTPATIENT
Start: 2022-10-10 | End: 2024-08-06

## 2022-10-10 NOTE — LETTER
10/10/2022         RE: Ebony Jo  [address]  [address]  [place] [address]        Dear Colleague,    Thank you for referring your patient, Ebony Jo, to the [facility]. Please see a copy of my visit note below.    [facility] Dermatology Note  Encounter Date: Oct 10, 2022  Store-and-Forward and Telephone ([phone]). Location of teledermatologist: [facility].  Start time: 4:57pm. End time: 5:03pm.    Dermatology Problem List:  Last skin check 1/7/22  1. Acne vulgaris with PIH  - Current tx: OTC azelaic acid  - previous tx: clindamycin 1% lotion.   - ILK 2.5mg/cc on 1/7/22     Social history: Has 2 children. Works at the  at [employer]. Pt not currently pregnant.    ____________________________________________     Assessment & Plan:     # Acne vulgaris-pt reports doing well  -has azelaic acid  -also vitamin C olay     # PIH- today in photos, improved, not on hydroquinone  - use triamcinolone on prurigo nodule on the right posterior arm. Use for at least 2-3 weeks. Okay to use bandaid. If not improving then bring in and also use clobetasol  -azelaic acid and new vitamin C serum, see AVS  - Future considerations: N-acetylcysteine.    Procedures Performed:    None    Follow-up:6 months photos and phone, call if lesion arm not improving.     Staff:     Collette Quezada MD    Department of Dermatology  [facility]: Phone: [phone], Fax:[phone]  [facility]: Phone: [phone], Fax: [phone]      ____________________________________________    CC: Follow Up (Follow up, patient skin improving, has dark spots )    HPI:  Ms. Ebony Jo is a(n) 37 year old female who presents today as a return patient for dark spots on the body. Pt feels she is improving.     She has not looked into  hydroquinone.     Feels face doing better than acne    Patient is picking spot on the arm    Patient is otherwise feeling well, without additional skin concerns.    Labs Reviewed:  NA    Physical Exam:  Vitals: LMP 11/17/2020 (Exact Date)   SKIN: Teledermatology photos were reviewed; image quality and interpretability:  acceptable. Image date: today  - hyperpigmented macules and chest and one lchenified papule on the upper arm  - No other lesions of concern on areas examined.     Medications:  Current Outpatient Medications   Medication     acetaminophen (TYLENOL) 500 MG tablet     azelaic acid (FINACIA) 15 % external gel     clindamycin (CLEOCIN T) 1 % external lotion     triamcinolone (KENALOG) 0.1 % external cream     hydroquinone (MAYRA) 4 % external cream     Prenatal Vit-Fe Fumarate-FA (PRENATAL MULTIVITAMIN PLUS IRON) 27-0.8 MG TABS per tablet     No current facility-administered medications for this visit.      Past Medical/Surgical History:   Patient Active Problem List   Diagnosis     CARDIOVASCULAR SCREENING; LDL GOAL LESS THAN 160     Migraine without aura     Hemifacial spasm, right     Acne vulgaris     Prurigo nodularis     Past Medical History:   Diagnosis Date     Hemifacial spasm, right 09/26/2013    stress-induced     Malaria 2009     Migraine without aura 9/26/2013     Seasonal allergies        CC No referring provider defined for this encounter. on close of this encounter.      Again, thank you for allowing me to participate in the care of your patient.        Sincerely,        Collette Quezada MD

## 2022-10-10 NOTE — PATIENT INSTRUCTIONS
[facility] Dermatology Visit    Thank you for allowing us to participate in your care. Your findings, instructions and follow-up plan are as follows:    Spot on the arm, use triamcinolone for 2-3 weeks, okay to use bandaid, try to avoid scratching. Contact clinic if not resolving.            When should I call my doctor?  If you are worsening or not improving, please, contact us or seek urgent care as noted below.     Who should I call with questions (adults)?  [facility] (adult and pediatric): [phone]  [facility] (adult): [phone]  For urgent needs outside of business hours call the [facility] at [phone] and ask for the dermatology resident on call  If this is a medical emergency and you are unable to reach an ER, Call [phone]    Who should I call with questions (pediatric)?  [facility]- Pediatric Dermatology  Dr. Radha Julian, Dr. Daryl Hughes, Dr. Raven Vázquez, Tracey Diaz, MOOK Hinds, Dr. Shari Starkey & Dr. iDo Correa  Non Urgent  Nurse Triage Line; [phone]- Zehra and Antonella RN Care Coordinators   Geno (/Complex ) [phone]    If you need a prescription refill, please contact your pharmacy. Refills are approved or denied by our physicians during normal business hours, Monday through Fridays  Per office policy, refills will not be granted if you have not been seen within the past year (or sooner depending on your child's condition).    Scheduling Information:  Pediatric Appointment Scheduling and Call Center [phone]  Radiology Scheduling- [phone]  Sedation Unit Scheduling- [phone]  [place] Scheduling- General [phone]; Pediatric Dermatology [phone]  Main  Services: [phone]  Colombian: [phone]  Lao: [phone]  Hmong/Luther/Alan: [phone]  Preadmission Nursing Department  Fax Number: [phone] (fax all pre-operative paperwork to this number)    For urgent matters arising during evenings, weekends, or holidays that cannot wait for normal business hours please call [phone] and ask for the dermatology resident on call to be paged.

## 2022-10-10 NOTE — PROGRESS NOTES
[facility] Dermatology Note  Encounter Date: Oct 10, 2022  Store-and-Forward and Telephone ([phone]). Location of teledermatologist: [facility].  Start time: 4:57pm. End time: 5:03pm.    Dermatology Problem List:  Last skin check 1/7/22  1. Acne vulgaris with PIH  - Current tx: OTC azelaic acid  - previous tx: clindamycin 1% lotion.   - ILK 2.5mg/cc on 1/7/22     Social history: Has 2 children. Works at the  at [employer]. Pt not currently pregnant.    ____________________________________________     Assessment & Plan:     # Acne vulgaris-pt reports doing well  -has azelaic acid  -also vitamin C olay     # PIH- today in photos, improved, not on hydroquinone  - use triamcinolone on prurigo nodule on the right posterior arm. Use for at least 2-3 weeks. Okay to use bandaid. If not improving then bring in and also use clobetasol  -azelaic acid and new vitamin C serum, see AVS  - Future considerations: N-acetylcysteine.    Procedures Performed:    None    Follow-up:6 months photos and phone, call if lesion arm not improving.     Staff:     Collette Quezada MD    Department of Dermatology  [facility]: Phone: [phone], Fax:[phone]  [facility]: Phone: [phone], Fax: [phone]      ____________________________________________    CC: Follow Up (Follow up, patient skin improving, has dark spots )    HPI:  Ms. Ebony Jo is a(n) 37 year old female who presents today as a return patient for dark spots on the body. Pt feels she is improving.     She has not looked into hydroquinone.     Feels face doing better than acne    Patient is picking spot on the arm    Patient is otherwise feeling well, without additional skin concerns.    Labs Reviewed:  NA    Physical Exam:  Vitals: LMP 11/17/2020 (Exact Date)   SKIN: Teledermatology  photos were reviewed; image quality and interpretability:  acceptable. Image date: today  - hyperpigmented macules and chest and one lchenified papule on the upper arm  - No other lesions of concern on areas examined.     Medications:  Current Outpatient Medications   Medication     acetaminophen (TYLENOL) 500 MG tablet     azelaic acid (FINACIA) 15 % external gel     clindamycin (CLEOCIN T) 1 % external lotion     triamcinolone (KENALOG) 0.1 % external cream     hydroquinone (MAYRA) 4 % external cream     Prenatal Vit-Fe Fumarate-FA (PRENATAL MULTIVITAMIN PLUS IRON) 27-0.8 MG TABS per tablet     No current facility-administered medications for this visit.      Past Medical/Surgical History:   Patient Active Problem List   Diagnosis     CARDIOVASCULAR SCREENING; LDL GOAL LESS THAN 160     Migraine without aura     Hemifacial spasm, right     Acne vulgaris     Prurigo nodularis     Past Medical History:   Diagnosis Date     Hemifacial spasm, right 09/26/2013    stress-induced     Malaria 2009     Migraine without aura 9/26/2013     Seasonal allergies        CC No referring provider defined for this encounter. on close of this encounter.

## 2022-10-11 ENCOUNTER — TELEPHONE (OUTPATIENT)
Dept: DERMATOLOGY | Facility: CLINIC | Age: 37
End: 2022-10-11

## 2022-10-11 NOTE — TELEPHONE ENCOUNTER
Attempted to reach patient to schedule follow up in the Dermatology Clinic.  No answer,  LM on VM to call office.    Schedule with Dr. Collette Quezada on or around 4/10/23.

## 2022-10-28 ENCOUNTER — E-VISIT (OUTPATIENT)
Dept: FAMILY MEDICINE | Facility: CLINIC | Age: 37
End: 2022-10-28
Payer: COMMERCIAL

## 2022-10-28 ENCOUNTER — MYC MEDICAL ADVICE (OUTPATIENT)
Dept: FAMILY MEDICINE | Facility: CLINIC | Age: 37
End: 2022-10-28

## 2022-10-28 DIAGNOSIS — R05.1 ACUTE COUGH: Primary | ICD-10-CM

## 2022-10-28 PROCEDURE — 99421 OL DIG E/M SVC 5-10 MIN: CPT | Performed by: FAMILY MEDICINE

## 2022-11-01 ENCOUNTER — LAB (OUTPATIENT)
Dept: URGENT CARE | Facility: URGENT CARE | Age: 37
End: 2022-11-01
Attending: FAMILY MEDICINE
Payer: COMMERCIAL

## 2022-11-01 DIAGNOSIS — R05.1 ACUTE COUGH: ICD-10-CM

## 2022-11-01 PROCEDURE — U0005 INFEC AGEN DETEC AMPLI PROBE: HCPCS

## 2022-11-01 PROCEDURE — U0003 INFECTIOUS AGENT DETECTION BY NUCLEIC ACID (DNA OR RNA); SEVERE ACUTE RESPIRATORY SYNDROME CORONAVIRUS 2 (SARS-COV-2) (CORONAVIRUS DISEASE [COVID-19]), AMPLIFIED PROBE TECHNIQUE, MAKING USE OF HIGH THROUGHPUT TECHNOLOGIES AS DESCRIBED BY CMS-2020-01-R: HCPCS

## 2022-11-01 PROCEDURE — 99207 PR NO CHARGE LOS: CPT

## 2022-11-01 RX ORDER — BENZONATATE 200 MG/1
200 CAPSULE ORAL 2 TIMES DAILY PRN
Qty: 6 CAPSULE | Refills: 0 | Status: SHIPPED | OUTPATIENT
Start: 2022-11-01 | End: 2024-08-06

## 2022-11-02 LAB — SARS-COV-2 RNA RESP QL NAA+PROBE: NEGATIVE

## 2022-11-02 NOTE — RESULT ENCOUNTER NOTE
MsRoberto Carlos Jo,    Your covid test is negative.    Please contact the clinic if you have additional questions.  Thank you.    Sincerely,    Desire Waldron MD

## 2022-11-09 ENCOUNTER — ALLIED HEALTH/NURSE VISIT (OUTPATIENT)
Dept: NURSING | Facility: CLINIC | Age: 37
End: 2022-11-09
Payer: COMMERCIAL

## 2022-11-09 DIAGNOSIS — Z23 NEED FOR PROPHYLACTIC VACCINATION AND INOCULATION AGAINST INFLUENZA: ICD-10-CM

## 2022-11-09 DIAGNOSIS — Z23 NEED FOR VACCINATION: ICD-10-CM

## 2022-11-09 PROCEDURE — 90686 IIV4 VACC NO PRSV 0.5 ML IM: CPT

## 2022-11-09 PROCEDURE — 90471 IMMUNIZATION ADMIN: CPT

## 2022-11-09 PROCEDURE — 99207 PR NO CHARGE NURSE ONLY: CPT

## 2022-11-21 ENCOUNTER — LAB (OUTPATIENT)
Dept: URGENT CARE | Facility: URGENT CARE | Age: 37
End: 2022-11-21
Payer: COMMERCIAL

## 2022-11-21 DIAGNOSIS — Z20.822 ENCOUNTER FOR LABORATORY TESTING FOR COVID-19 VIRUS: ICD-10-CM

## 2022-11-21 PROCEDURE — U0003 INFECTIOUS AGENT DETECTION BY NUCLEIC ACID (DNA OR RNA); SEVERE ACUTE RESPIRATORY SYNDROME CORONAVIRUS 2 (SARS-COV-2) (CORONAVIRUS DISEASE [COVID-19]), AMPLIFIED PROBE TECHNIQUE, MAKING USE OF HIGH THROUGHPUT TECHNOLOGIES AS DESCRIBED BY CMS-2020-01-R: HCPCS

## 2022-11-21 PROCEDURE — U0005 INFEC AGEN DETEC AMPLI PROBE: HCPCS

## 2022-11-22 ENCOUNTER — TELEPHONE (OUTPATIENT)
Dept: FAMILY MEDICINE | Facility: CLINIC | Age: 37
End: 2022-11-22

## 2022-11-22 DIAGNOSIS — Z71.84 TRAVEL ADVICE ENCOUNTER: Primary | ICD-10-CM

## 2022-11-22 LAB — SARS-COV-2 RNA RESP QL NAA+PROBE: NEGATIVE

## 2022-11-22 RX ORDER — ATOVAQUONE AND PROGUANIL HYDROCHLORIDE 250; 100 MG/1; MG/1
1 TABLET, FILM COATED ORAL DAILY
Qty: 25 TABLET | Refills: 0 | Status: SHIPPED | OUTPATIENT
Start: 2022-11-22 | End: 2023-05-18

## 2022-11-22 NOTE — TELEPHONE ENCOUNTER
Patient had a travel visit on 9/29 and declined Malaria prophylaxis at that time because she wanted to check with the country she was traveling to to see if it was necessary.     She would like the medication sent to her pharmacy today. They are traveling in a couple hours and need the medication.     Also has 2 children that need medication, sent in separate TE.    Pharmacy:  [address]  [facility]    Carmela Correa RN

## 2022-11-22 NOTE — TELEPHONE ENCOUNTER
Patient and children may have already departed the country but I sent RX for Malarone to her pharmacy ([facility]).  I attempted to call her and I left a message.   Evelin Carranza (Lori) CNP  [facility]  Certificate in Travel Health

## 2023-04-24 ENCOUNTER — VIRTUAL VISIT (OUTPATIENT)
Dept: DERMATOLOGY | Facility: CLINIC | Age: 38
End: 2023-04-24
Payer: COMMERCIAL

## 2023-04-24 DIAGNOSIS — L70.0 ACNE VULGARIS: ICD-10-CM

## 2023-04-24 PROCEDURE — 99213 OFFICE O/P EST LOW 20 MIN: CPT | Mod: 93 | Performed by: DERMATOLOGY

## 2023-04-24 RX ORDER — AZELAIC ACID 0.15 G/G
GEL TOPICAL
Qty: 50 G | Refills: 11 | Status: SHIPPED | OUTPATIENT
Start: 2023-04-24 | End: 2024-08-06

## 2023-04-24 NOTE — PROGRESS NOTES
[facility] Dermatology Note  Encounter Date: Apr 24, 2023  Store-and-Forward and Telephone ([phone]). Location of teledermatologist: [facility].  Start time: 5:13pm. End time: 5:16pm.    Dermatology Problem List:  Last skin check 1/7/22  1. Acne vulgaris with PIH  - Current tx: OTC azelaic acid  - previous tx: clindamycin 1% lotion.   - ILK 2.5mg/cc on 1/7/22     Social history: Has 2 children. Works at the  at [employer]. Pt not currently pregnant.    ____________________________________________     Assessment & Plan:     # Acne vulgaris-pt reports doing well  -has azelaic acid, will continue  -sunscreen and hats  -also vitamin C olay when done then switch to niacinamide vitamin C la roche posay     # PIH- today in photos- she is improved  - use triamcinolone  For prurigo nodules   -azelaic acid and niacinamide.   - Future considerations: N-acetylcysteine.    Procedures Performed:    None    Follow-up:    1 year or as needed    Staff:     Collette Quezada MD    Department of Dermatology  [facility]: Phone: [phone], Fax:[phone]  [facility]: Phone: [phone], Fax: [phone]      ____________________________________________    CC: Telephone (6 mo follow up - Prurigo nodularis)    HPI:  Ms. Ebony Jo is a(n) 37 year old female who presents today as a return patient for dark spots now fading    Face and extremities    Using azelaic acid but not vitamin C  Not BF    Patient is otherwise feeling well, without additional skin concerns.    Labs Reviewed:  NA    Physical Exam:  Vitals: LMP 11/17/2020 (Exact Date)   SKIN: Teledermatology photos were reviewed; image quality and interpretability: * acceptable. Image date: hyperpigmented macules and patches on extremities and face  See upload date  - No other lesions of concern  on areas examined.     Medications:  Current Outpatient Medications   Medication     azelaic acid (FINACIA) 15 % external gel     clindamycin (CLEOCIN T) 1 % external lotion     triamcinolone (KENALOG) 0.1 % external cream     acetaminophen (TYLENOL) 500 MG tablet     atovaquone-proguanil (MALARONE) 250-100 MG tablet     benzonatate (TESSALON) 200 MG capsule     Prenatal Vit-Fe Fumarate-FA (PRENATAL MULTIVITAMIN PLUS IRON) 27-0.8 MG TABS per tablet     No current facility-administered medications for this visit.      Past Medical/Surgical History:   Patient Active Problem List   Diagnosis     CARDIOVASCULAR SCREENING; LDL GOAL LESS THAN 160     Migraine without aura     Hemifacial spasm, right     Acne vulgaris     Prurigo nodularis     Past Medical History:   Diagnosis Date     Hemifacial spasm, right 09/26/2013    stress-induced     Malaria 2009     Migraine without aura 9/26/2013     Seasonal allergies        CC No referring provider defined for this encounter. on close of this encounter.

## 2023-04-24 NOTE — NURSING NOTE
Teledermatology Nurse Call Patients:     Are you in the [facility] at the time of the encounter? yes    Today's visit will be billed to you and your insurance.    A teledermatology visit is not as thorough as an in-person visit and the quality of the photograph sent may not be of the same quality as that taken by the dermatology clinic.    Chief Complaint   Patient presents with     Telephone     6 mo follow up - Prurigo nodularis       Pt stated that she would upload photos into [employer] ahead of the appointment.  Last checked at 4:23- no photos yet.       Olivia Leon on 4/24/2023 at 3:35 PM

## 2023-04-24 NOTE — PATIENT INSTRUCTIONS
[facility] Dermatology Visit    Thank you for allowing us to participate in your care. Your findings, instructions and follow-up plan are as follows:                 When should I call my doctor?  If you are worsening or not improving, please, contact us or seek urgent care as noted below.     Who should I call with questions (adults)?  [facility] (adult and pediatric): [phone]  [facility] (adult): [phone]  For urgent needs outside of business hours call the [facility] at [phone] and ask for the dermatology resident on call  If this is a medical emergency and you are unable to reach an ER, Call 911    Who should I call with questions (pediatric)?  [facility]- Pediatric Dermatology  Dr. Radha Julian, Dr. Daryl Hughes, Dr. Raven Vázquez, Tracey Diaz, PA  Dr. Landy Hinds, Dr. Shari Starkey & Dr. Dio Correa  Non Urgent  Nurse Triage Line; [phone]- Zehra and Antonella CASTRO Care Coordinators   [place] (/Complex ) [phone]    If you need a prescription refill, please contact your pharmacy. Refills are approved or denied by our physicians during normal business hours, Monday through Fridays  Per office policy, refills will not be granted if you have not been seen within the past year (or sooner depending on your child's condition).    Scheduling Information:  Pediatric Appointment Scheduling and Call Center [phone]  Radiology Scheduling- [phone]  Sedation Unit Scheduling- [phone]  [place] Scheduling- General [phone]; Pediatric Dermatology [phone]  Main  Services: [phone]  Divehi: [phone]  Eritrean: [phone]  Hmong/Luther/Alan: [phone]  Preadmission Nursing Department Fax Number: [phone] (fax all pre-operative paperwork to this number)    For urgent matters arising during evenings,  weekends, or holidays that cannot wait for normal business hours please call [phone] and ask for the dermatology resident on call to be paged.

## 2023-04-24 NOTE — LETTER
4/24/2023         RE: Ebony Jo  [address]  [place] [address]        Dear Colleague,    Thank you for referring your patient, Ebony Jo, to the [facility]. Please see a copy of my visit note below.    [facility] Dermatology Note  Encounter Date: Apr 24, 2023  Store-and-Forward and Telephone ([phone]). Location of teledermatologist: [facility].  Start time: 5:13pm. End time: 5:16pm.    Dermatology Problem List:  Last skin check 1/7/22  1. Acne vulgaris with PIH  - Current tx: OTC azelaic acid  - previous tx: clindamycin 1% lotion.   - ILK 2.5mg/cc on 1/7/22     Social history: Has 2 children. Works at the  at [employer]. Pt not currently pregnant.    ____________________________________________     Assessment & Plan:     # Acne vulgaris-pt reports doing well  -has azelaic acid, will continue  -sunscreen and hats  -also vitamin C olay when done then switch to niacinamide vitamin C la roche posay     # PIH- today in photos- she is improved  - use triamcinolone  For prurigo nodules   -azelaic acid and niacinamide.   - Future considerations: N-acetylcysteine.    Procedures Performed:    None    Follow-up:    1 year or as needed    Staff:     Collette Quezada MD    Department of Dermatology  [facility]: Phone: [phone], Fax:[phone]  [facility]: Phone: [phone], Fax: [phone]      ____________________________________________    CC: Telephone (6 mo follow up - Prurigo nodularis)    HPI:  Ms. Ebony Jo is a(n) 37 year old female who presents today as a return patient for dark spots now fading    Face and extremities    Using azelaic acid but not vitamin C  Not BF    Patient is otherwise feeling well, without additional skin concerns.    Labs Reviewed:  NA    Physical  Exam:  Vitals: LMP 11/17/2020 (Exact Date)   SKIN: Teledermatology photos were reviewed; image quality and interpretability: * acceptable. Image date: hyperpigmented macules and patches on extremities and face  See upload date  - No other lesions of concern on areas examined.     Medications:  Current Outpatient Medications   Medication     azelaic acid (FINACIA) 15 % external gel     clindamycin (CLEOCIN T) 1 % external lotion     triamcinolone (KENALOG) 0.1 % external cream     acetaminophen (TYLENOL) 500 MG tablet     atovaquone-proguanil (MALARONE) 250-100 MG tablet     benzonatate (TESSALON) 200 MG capsule     Prenatal Vit-Fe Fumarate-FA (PRENATAL MULTIVITAMIN PLUS IRON) 27-0.8 MG TABS per tablet     No current facility-administered medications for this visit.      Past Medical/Surgical History:   Patient Active Problem List   Diagnosis     CARDIOVASCULAR SCREENING; LDL GOAL LESS THAN 160     Migraine without aura     Hemifacial spasm, right     Acne vulgaris     Prurigo nodularis     Past Medical History:   Diagnosis Date     Hemifacial spasm, right 09/26/2013    stress-induced     Malaria 2009     Migraine without aura 9/26/2013     Seasonal allergies        CC No referring provider defined for this encounter. on close of this encounter.    Again, thank you for allowing me to participate in the care of your patient.        Sincerely,        Collette Quezada MD

## 2023-05-15 ENCOUNTER — E-VISIT (OUTPATIENT)
Dept: URGENT CARE | Facility: URGENT CARE | Age: 38
End: 2023-05-15
Payer: COMMERCIAL

## 2023-05-15 DIAGNOSIS — H10.33 ACUTE BACTERIAL CONJUNCTIVITIS OF BOTH EYES: Primary | ICD-10-CM

## 2023-05-15 PROCEDURE — 99421 OL DIG E/M SVC 5-10 MIN: CPT | Performed by: PHYSICIAN ASSISTANT

## 2023-05-15 RX ORDER — POLYMYXIN B SULFATE AND TRIMETHOPRIM 1; 10000 MG/ML; [USP'U]/ML
SOLUTION OPHTHALMIC
Qty: 10 ML | Refills: 0 | Status: SHIPPED | OUTPATIENT
Start: 2023-05-15 | End: 2023-05-22

## 2023-05-15 NOTE — PATIENT INSTRUCTIONS
Thank you for choosing us for your care. I have placed an order for a prescription so that you can start treatment. View your full visit summary for details by clicking on the link below. Your pharmacist will able to address any questions you may have about the medication.     If you re not feeling better within 2-3 days, please schedule an appointment.  You can schedule an appointment right here in [facility], or call [phone]  If the visit is for the same symptoms as your eVisit, we ll refund the cost of your eVisit if seen within seven days.      Bacterial Conjunctivitis    You have an infection in the membranes covering the white part of the eye. This part of the eye is called the conjunctiva. The infection is called conjunctivitis. The most common symptoms of conjunctivitis include a thick, pus-like discharge from the eye, swollen eyelids, redness, eyelids sticking together upon awakening, and a gritty or scratchy feeling in the eye. Your infection was caused by bacteria. It may be treated with medicine. With treatment, the infection takes about 7 to 10 days to resolve.   Home care    Use prescribed antibiotic eye drops or ointment as directed to treat the infection.    Apply a warm compress (towel soaked in warm water) to the affected eye 3 to 4 times a day. Do this just before applying medicine to the eye.    Use a warm, wet cloth to wipe away crusting of the eyelids in the morning. This is caused by mucus drainage during the night. You may also use saline irrigating solution or artificial tears to rinse away mucus in the eye. Do not put a patch over the eye.    Wash your hands before and after touching the infected eye. This is to prevent spreading the infection to the other eye, and to other people. Don't share your towels or washcloths with others.    You may use acetaminophen or ibuprofen to control pain, unless another medicine was prescribed. Talk with your healthcare provider before using these  medicines if you have chronic liver or kidney disease. Also talk with your provider if you have ever had a stomach ulcer or digestive bleeding.    Don't wear contact lenses until your eyes have healed and all symptoms are gone.    Follow-up care  Follow up with your healthcare provider, or as advised.  When to seek medical advice  Call your healthcare provider right away if any of these occur:    Worsening vision    Increasing pain in the eye    Increasing swelling or redness of the eyelid    Redness spreading around the eye  [email or web address] last reviewed this educational content on 4/1/2020 2000-2022 [employer]. All rights reserved. This information is not intended as a substitute for professional medical care. Always follow your healthcare professional's instructions.

## 2023-05-18 ENCOUNTER — OFFICE VISIT (OUTPATIENT)
Dept: FAMILY MEDICINE | Facility: CLINIC | Age: 38
End: 2023-05-18
Payer: COMMERCIAL

## 2023-05-18 VITALS
TEMPERATURE: 99 F | WEIGHT: 202.6 LBS | OXYGEN SATURATION: 100 % | SYSTOLIC BLOOD PRESSURE: 117 MMHG | DIASTOLIC BLOOD PRESSURE: 72 MMHG | RESPIRATION RATE: 17 BRPM | HEIGHT: 64 IN | HEART RATE: 65 BPM | BODY MASS INDEX: 34.59 KG/M2

## 2023-05-18 DIAGNOSIS — E66.09 CLASS 1 OBESITY DUE TO EXCESS CALORIES WITHOUT SERIOUS COMORBIDITY WITH BODY MASS INDEX (BMI) OF 34.0 TO 34.9 IN ADULT: ICD-10-CM

## 2023-05-18 DIAGNOSIS — Z00.00 ROUTINE GENERAL MEDICAL EXAMINATION AT A HEALTH CARE FACILITY: Primary | ICD-10-CM

## 2023-05-18 DIAGNOSIS — E66.811 CLASS 1 OBESITY DUE TO EXCESS CALORIES WITHOUT SERIOUS COMORBIDITY WITH BODY MASS INDEX (BMI) OF 34.0 TO 34.9 IN ADULT: ICD-10-CM

## 2023-05-18 DIAGNOSIS — R20.0 NUMBNESS AND TINGLING IN BOTH HANDS: ICD-10-CM

## 2023-05-18 DIAGNOSIS — R73.03 PREDIABETES: ICD-10-CM

## 2023-05-18 DIAGNOSIS — R20.2 NUMBNESS AND TINGLING IN BOTH HANDS: ICD-10-CM

## 2023-05-18 DIAGNOSIS — R29.810 FACIAL DROOP: ICD-10-CM

## 2023-05-18 LAB
HBA1C MFR BLD: 6.2 % (ref 0–5.6)
HGB BLD-MCNC: 12.4 G/DL (ref 11.7–15.7)

## 2023-05-18 PROCEDURE — 99396 PREV VISIT EST AGE 40-64: CPT | Performed by: FAMILY MEDICINE

## 2023-05-18 PROCEDURE — 36415 COLL VENOUS BLD VENIPUNCTURE: CPT | Performed by: FAMILY MEDICINE

## 2023-05-18 PROCEDURE — 80061 LIPID PANEL: CPT | Performed by: FAMILY MEDICINE

## 2023-05-18 PROCEDURE — 83036 HEMOGLOBIN GLYCOSYLATED A1C: CPT | Performed by: FAMILY MEDICINE

## 2023-05-18 PROCEDURE — 80048 BASIC METABOLIC PNL TOTAL CA: CPT | Performed by: FAMILY MEDICINE

## 2023-05-18 PROCEDURE — 85018 HEMOGLOBIN: CPT | Performed by: FAMILY MEDICINE

## 2023-05-18 PROCEDURE — 99214 OFFICE O/P EST MOD 30 MIN: CPT | Mod: 25 | Performed by: FAMILY MEDICINE

## 2023-05-18 ASSESSMENT — PAIN SCALES - GENERAL: PAINLEVEL: NO PAIN (0)

## 2023-05-18 ASSESSMENT — ENCOUNTER SYMPTOMS
ARTHRALGIAS: 0
HEARTBURN: 0
DIZZINESS: 0
CHILLS: 0
PARESTHESIAS: 0
CONSTIPATION: 0
WEAKNESS: 0
JOINT SWELLING: 0
SHORTNESS OF BREATH: 0
HEADACHES: 0
ABDOMINAL PAIN: 0
BREAST MASS: 0
HEMATURIA: 0
NAUSEA: 0
PALPITATIONS: 0
EYE PAIN: 1
MYALGIAS: 0
COUGH: 0
DIARRHEA: 0
SORE THROAT: 0
NERVOUS/ANXIOUS: 0
HEMATOCHEZIA: 0
FREQUENCY: 0
FEVER: 0
DYSURIA: 0

## 2023-05-18 NOTE — PATIENT INSTRUCTIONS
At [facility], we strive to deliver an exceptional experience to you, every time we see you. If you receive a survey, please complete it as we do value your feedback.  If you have MyChart, you can expect to receive results automatically within 24 hours of their completion.  Your provider will send a note interpreting your results as well.   If you do not have MyChart, you should receive your results in about a week by mail.    Your care team:                            Family Medicine Internal Medicine   MD Tony Stovall MD Shantel Branch-Fleming, MD Srinivasa Vaka, MD Katya Belousova, GLADYS SwainHillJULIANNA Vasquez CNP, MD Pediatrics   James Borden, MD Cielo Saucedo MD Amelia Massimini APRN CNP   Tara Barrera, APRN CNP MD Dmitry Sun MD          Clinic hours: Monday - Thursday 7 am-6 pm; Fridays 7 am-5 pm.   Urgent care: Monday - Friday 10 am- 8 pm; Saturday and Sunday 9 am-5 pm.    Clinic: [phone]       [facility]: Monday - Thursday 8 am - 7 pm; Friday 8 am - 6 pm  [facility]: [phone]    Preventive Health Recommendations  Female Ages 40 to 49    Yearly exam:     See your health care provider every year in order to  1. Review health changes.   2. Discuss preventive care.    3. Review your medicines if your doctor prescribed any.      Get a Pap test every three years (unless you have an abnormal result and your provider advises testing more often).      If you get Pap tests with HPV test, you only need to test every 5 years, unless you have an abnormal result. You do not need a Pap test if your uterus was removed (hysterectomy) and you have not had cancer.      You should be tested each year for STDs (sexually transmitted diseases), if you're at risk.     Ask your doctor if you should have a mammogram.      Have a colonoscopy  (test for colon cancer) if someone in your family has had colon cancer or polyps before age 50.       Have a cholesterol test every 5 years.       Have a diabetes test (fasting glucose) after age 45. If you are at risk for diabetes, you should have this test every 3 years.    Shots: Get a flu shot each year. Get a tetanus shot every 10 years.     Nutrition:     Eat at least 5 servings of fruits and vegetables each day.    Eat whole-grain bread, whole-wheat pasta and brown rice instead of white grains and rice.    Get adequate Calcium and Vitamin D.      Lifestyle    Exercise at least 150 minutes a week (an average of 30 minutes a day, 5 days a week). This will help you control your weight and prevent disease.    Limit alcohol to one drink per day.    No smoking.     Wear sunscreen to prevent skin cancer.    See your dentist every six months for an exam and cleaning.

## 2023-05-18 NOTE — PROGRESS NOTES
SUBJECTIVE:   CC: Ebony is an 38 year old who presents for preventive health visit.       2023    12:46 PM   Additional Questions   Roomed by Ángela   Accompanied by Self     Patient has been advised of split billing requirements and indicates understanding: Yes  Healthy Habits:    Getting at least 3 servings of Calcium per day:  Yes    Bi-annual eye exam:  NO    Dental care twice a year:  Yes    Sleep apnea or symptoms of sleep apnea:  None    Diet:  Regular (no restrictions)    Frequency of exercise:  2-3 days/week    Duration of exercise:  15-30 minutes    Taking medications regularly:  Yes    Medication side effects:  None    PHQ-2 Total Score:    Additional concerns today:  No      Social History     Tobacco Use     Smoking status: Never     Smokeless tobacco: Never   Vaping Use     Vaping status: Never Used   Substance Use Topics     Alcohol use: No     Alcohol/week: 0.0 standard drinks of alcohol     Comment:  0-2  wine  glasses monthly, not in PG             2023    12:42 PM   Alcohol Use   Prescreen: >3 drinks/day or >7 drinks/week? No          View : No data to display.              Reviewed orders with patient.  Reviewed health maintenance and updated orders accordingly - Yes  Labs reviewed in EPIC    Breast Cancer Screenin/18/2022     1:29 PM   Breast CA Risk Assessment (FHS-7)   Do you have a family history of breast, colon, or ovarian cancer? No / Unknown           Pertinent mammograms are reviewed under the imaging tab.    History of abnormal Pap smear: Last 3 Pap and HPV Results:       Latest Ref Rng & Units 2022     4:46 PM 3/20/2019     4:25 PM 3/20/2019     3:00 PM   PAP / HPV   PAP  Negative for Intraepithelial Lesion or Malignancy (NILM)       PAP (Historical)   NIL      HPV 16 DNA Negative Negative    Negative     HPV 18 DNA Negative Negative    Negative     Other HR HPV Negative Negative    Negative           Latest Ref Rng & Units 2022     4:46 PM 3/20/2019  "    4:25 PM 3/20/2019     3:00 PM   PAP / HPV   PAP  Negative for Intraepithelial Lesion or Malignancy (NILM)       PAP (Historical)   NIL      HPV 16 DNA Negative Negative    Negative     HPV 18 DNA Negative Negative    Negative     Other HR HPV Negative Negative    Negative       Reviewed and updated as needed this visit by clinical staff   Tobacco  Allergies  Meds              Reviewed and updated as needed this visit by Provider                     Review of Systems   Constitutional: Negative for chills and fever.   HENT: Negative for congestion, ear pain, hearing loss and sore throat.    Eyes: Positive for pain and visual disturbance.   Respiratory: Negative for cough and shortness of breath.    Cardiovascular: Negative for chest pain, palpitations and peripheral edema.   Gastrointestinal: Negative for abdominal pain, constipation, diarrhea, heartburn, hematochezia and nausea.   Breasts:  Negative for tenderness, breast mass and discharge.   Genitourinary: Negative for dysuria, frequency, genital sores, hematuria, pelvic pain, urgency, vaginal bleeding and vaginal discharge.   Musculoskeletal: Negative for arthralgias, joint swelling and myalgias.   Skin: Negative for rash.   Neurological: Negative for dizziness, weakness, headaches and paresthesias.   Psychiatric/Behavioral: Negative for mood changes. The patient is not nervous/anxious.           OBJECTIVE:   /72 (BP Location: Left arm, Patient Position: Sitting, Cuff Size: Adult Regular)   Pulse 65   Temp 99  F (37.2  C) (Oral)   Resp 17   Ht 1.621 m (5' 3.82\")   Wt 91.9 kg (202 lb 9.6 oz)   LMP 11/17/2020 (Exact Date)   SpO2 100%   Breastfeeding No   BMI 34.97 kg/m    Physical Exam  GENERAL: healthy, alert and no distress  NECK: no adenopathy, no asymmetry, masses, or scars and thyroid normal to palpation  RESP: lungs clear to auscultation - no rales, rhonchi or wheezes  CV: regular rate and rhythm, normal S1 S2, no S3 or S4, no murmur, " "click or rub, no peripheral edema and peripheral pulses strong  ABDOMEN: soft, nontender, no hepatosplenomegaly, no masses and bowel sounds normal  MS: no gross musculoskeletal defects noted, no edema    Diagnostic Test Results:  Labs reviewed in Epic    ASSESSMENT/PLAN:   (Z00.00) Routine general medical examination at a health care facility  (primary encounter diagnosis)  -Vitals stable, depression screening normal.  Plan: Basic metabolic panel  (Ca, Cl, CO2, Creat,         Gluc, K, Na, BUN), Lipid panel reflex to direct        LDL Fasting, Hemoglobin       (R20.0,  R20.2) Numbness and tingling in both hands  -Working with [employer] at [facility] [employer].  -Using hands a lot for typing.  -Numbness and tingling in all fingers intermittently.  Likely carpal tunnel.  -Recommended wearing carpal tunnel splint.    -If symptoms continue to progress, follow-up in the clinic to consider EMG/Ortho referral.    (R29.810) Facial droop  -Chronic right-sided facial droop for several years.  Has been evaluated in the past (several years back) by neurology who suggested Botox injections- Ebony was not interested during that time  -She preferred neurology referral again.  Suggested physical therapy for facial muscles,Ebony preferred to hold.    Plan: Adult Neurology  Referral           (R73.03) Prediabetes    Plan: Hemoglobin A1c      (E66.09,  Z68.34) Class 1 obesity due to excess calories without serious comorbidity with body mass index (BMI) of 34.0 to 34.9 in adult  -Recommended healthy diet and active lifestyle.    Patient has been advised of split billing requirements and indicates understanding: Yes      COUNSELING:  Reviewed preventive health counseling, as reflected in patient instructions       Regular exercise       Healthy diet/nutrition      BMI:   Estimated body mass index is 34.97 kg/m  as calculated from the following:    Height as of this encounter: 1.621 m (5' 3.82\").    Weight as of this encounter: 91.9 kg " (202 lb 9.6 oz).   Weight management plan: Discussed healthy diet and exercise guidelines      She reports that she has never smoked. She has never used smokeless tobacco.          Dmitry Howe MD  [facility]

## 2023-05-19 LAB
ANION GAP SERPL CALCULATED.3IONS-SCNC: 2 MMOL/L (ref 3–14)
BUN SERPL-MCNC: 7 MG/DL (ref 7–30)
CALCIUM SERPL-MCNC: 9 MG/DL (ref 8.5–10.1)
CHLORIDE BLD-SCNC: 109 MMOL/L (ref 94–109)
CHOLEST SERPL-MCNC: 117 MG/DL
CO2 SERPL-SCNC: 27 MMOL/L (ref 20–32)
CREAT SERPL-MCNC: 0.74 MG/DL (ref 0.52–1.04)
FASTING STATUS PATIENT QL REPORTED: NO
GFR SERPL CREATININE-BSD FRML MDRD: >90 ML/MIN/1.73M2
GLUCOSE BLD-MCNC: 99 MG/DL (ref 70–99)
HDLC SERPL-MCNC: 46 MG/DL
LDLC SERPL CALC-MCNC: 44 MG/DL
NONHDLC SERPL-MCNC: 71 MG/DL
POTASSIUM BLD-SCNC: 4.8 MMOL/L (ref 3.4–5.3)
SODIUM SERPL-SCNC: 138 MMOL/L (ref 133–144)
TRIGL SERPL-MCNC: 136 MG/DL

## 2023-09-14 NOTE — TELEPHONE ENCOUNTER
RECORDS RECEIVED FROM: Internal   REASON FOR VISIT: Facial droop    Date of Appt: 11/15/2023   NOTES (FOR ALL VISITS) STATUS DETAILS   OFFICE NOTE from referring provider Internal 05/18/2023 Dr Queen [facility]    OFFICE NOTE from other specialist Internal 10/17/2013 Dr Callaway [facility]    DISCHARGE SUMMARY from hospital N/A    DISCHARGE REPORT from the ER N/A    OPERATIVE REPORT N/A    GHAZAL Virus Labs (MS ONLY) N/A    EMG N/A    EEG N/A    MEDICATION LIST N/A    IMAGING  (FOR ALL VISITS)     LUMBAR PUNCTURE N/A    ELKIN SCAN (MOVEMENT) N/A    ULTRASOUND (CAROTID BILAT) *VASCULAR* N/A    MRI (HEAD, NECK, SPINE) Internal 10/08/2013 brain    CT (HEAD, NECK, SPINE) N/A

## 2023-11-15 ENCOUNTER — PRE VISIT (OUTPATIENT)
Dept: NEUROLOGY | Facility: CLINIC | Age: 38
End: 2023-11-15

## 2024-02-17 ENCOUNTER — HEALTH MAINTENANCE LETTER (OUTPATIENT)
Age: 39
End: 2024-02-17

## 2024-04-15 ENCOUNTER — ANCILLARY PROCEDURE (OUTPATIENT)
Dept: GENERAL RADIOLOGY | Facility: CLINIC | Age: 39
End: 2024-04-15
Attending: FAMILY MEDICINE
Payer: COMMERCIAL

## 2024-04-15 ENCOUNTER — OFFICE VISIT (OUTPATIENT)
Dept: FAMILY MEDICINE | Facility: CLINIC | Age: 39
End: 2024-04-15
Payer: COMMERCIAL

## 2024-04-15 VITALS
BODY MASS INDEX: 32.65 KG/M2 | HEIGHT: 65 IN | OXYGEN SATURATION: 100 % | WEIGHT: 196 LBS | DIASTOLIC BLOOD PRESSURE: 72 MMHG | HEART RATE: 63 BPM | RESPIRATION RATE: 16 BRPM | SYSTOLIC BLOOD PRESSURE: 122 MMHG

## 2024-04-15 DIAGNOSIS — Z12.31 VISIT FOR SCREENING MAMMOGRAM: ICD-10-CM

## 2024-04-15 DIAGNOSIS — E66.09 CLASS 1 OBESITY DUE TO EXCESS CALORIES WITHOUT SERIOUS COMORBIDITY WITH BODY MASS INDEX (BMI) OF 33.0 TO 33.9 IN ADULT: ICD-10-CM

## 2024-04-15 DIAGNOSIS — R73.03 PREDIABETES: ICD-10-CM

## 2024-04-15 DIAGNOSIS — R19.05 PERIUMBILICAL SWELLING: ICD-10-CM

## 2024-04-15 DIAGNOSIS — M25.551 HIP PAIN, RIGHT: ICD-10-CM

## 2024-04-15 DIAGNOSIS — Z00.00 ROUTINE GENERAL MEDICAL EXAMINATION AT A HEALTH CARE FACILITY: Primary | ICD-10-CM

## 2024-04-15 DIAGNOSIS — E66.811 CLASS 1 OBESITY DUE TO EXCESS CALORIES WITHOUT SERIOUS COMORBIDITY WITH BODY MASS INDEX (BMI) OF 33.0 TO 33.9 IN ADULT: ICD-10-CM

## 2024-04-15 PROCEDURE — 99213 OFFICE O/P EST LOW 20 MIN: CPT | Mod: 25 | Performed by: FAMILY MEDICINE

## 2024-04-15 PROCEDURE — 99396 PREV VISIT EST AGE 40-64: CPT | Performed by: FAMILY MEDICINE

## 2024-04-15 PROCEDURE — 73502 X-RAY EXAM HIP UNI 2-3 VIEWS: CPT | Mod: TC | Performed by: RADIOLOGY

## 2024-04-15 SDOH — HEALTH STABILITY: PHYSICAL HEALTH: ON AVERAGE, HOW MANY MINUTES DO YOU ENGAGE IN EXERCISE AT THIS LEVEL?: 110 MIN

## 2024-04-15 SDOH — HEALTH STABILITY: PHYSICAL HEALTH: ON AVERAGE, HOW MANY DAYS PER WEEK DO YOU ENGAGE IN MODERATE TO STRENUOUS EXERCISE (LIKE A BRISK WALK)?: 3 DAYS

## 2024-04-15 ASSESSMENT — PAIN SCALES - GENERAL: PAINLEVEL: EXTREME PAIN (8)

## 2024-04-15 ASSESSMENT — SOCIAL DETERMINANTS OF HEALTH (SDOH): HOW OFTEN DO YOU GET TOGETHER WITH FRIENDS OR RELATIVES?: TWICE A WEEK

## 2024-04-15 NOTE — PROGRESS NOTES
"Preventive Care Visit  [facility]  Dmitry Bret Howe MD, Family Medicine  Apr 15, 2024      Assessment & Plan     Routine general medical examination at a health care facility    - Hemoglobin; Future  - Basic metabolic panel  (Ca, Cl, CO2, Creat, Gluc, K, Na, BUN); Future  - Lipid panel reflex to direct LDL Fasting; Future    Hip pain, right  -Ebony has been noticing right hip discomfort for the past 2 months mainly when changing postures and after prolonged walking.  -No restriction of range of movements.    PLAN:  -Physical therapy/hip x-ray/Tylenol as needed.  Follow-up in 2 months to reassess.    - XR Hip Right 2-3 Views; Future  - Physical Therapy  Referral; Future    Periumbilical swelling  -Painless, reducible swelling noted above bellybutton with increased intra-abdominal pressure.    - US Hernia Evaluation; Future    Visit for screening mammogram    - MA SCREENING DIGITAL BILAT - Future  (s+30); Future    Prediabetes    - Hemoglobin A1c; Future    Class 1 obesity due to excess calories without serious comorbidity with body mass index (BMI) of 33.0 to 33.9 in adult  -Recommended healthy diet and active lifestyle.    Patient has been advised of split billing requirements and indicates understanding: Yes    30 minutes spent by me on the date of the encounter doing chart review, patient visit, and documentation       BMI  Estimated body mass index is 33.12 kg/m  as calculated from the following:    Height as of this encounter: 1.638 m (5' 4.5\").    Weight as of this encounter: 88.9 kg (196 lb).   Weight management plan: Discussed healthy diet and exercise guidelines    Counseling  Appropriate preventive services were discussed with this patient, including applicable screening as appropriate for fall prevention, nutrition, physical activity, Tobacco-use cessation, weight loss and cognition.  Checklist reviewing preventive services available has been given to " the patient.  Reviewed patient's diet, addressing concerns and/or questions.   She is at risk for lack of exercise and has been provided with information to increase physical activity for the benefit of her well-being.   She is at risk for psychosocial distress and has been provided with information to reduce risk.           Vinicio Beck is a 38 year old, presenting for the following:  Physical        4/15/2024     1:23 PM   Additional Questions   Roomed by [facility] Care Directive  Patient does not have a Health Care Directive or Living Will:     HPI              4/15/2024   General Health   How would you rate your overall physical health? (!) FAIR   Feel stress (tense, anxious, or unable to sleep) Only a little   (!) STRESS CONCERN      4/15/2024   Nutrition   Three or more servings of calcium each day? Yes   Diet: Regular (no restrictions)   How many servings of fruit and vegetables per day? (!) 0-1   How many sweetened beverages each day? 0-1         4/15/2024   Exercise   Days per week of moderate/strenous exercise 3 days   Average minutes spent exercising at this level 110 min         4/15/2024   Social Factors   Frequency of gathering with friends or relatives Twice a week   Worry food won't last until get money to buy more No   Food not last or not have enough money for food? No   Do you have housing?  Yes   Are you worried about losing your housing? No   Lack of transportation? No   Unable to get utilities (heat,electricity)? No         4/15/2024   Dental   Dentist two times every year? Yes            Today's PHQ-2 Score:       4/15/2024     1:35 PM   PHQ-2 ( 1999 [employer])   Q1: Little interest or pleasure in doing things 1   Q2: Feeling down, depressed or hopeless 1   PHQ-2 Score 2   Q1: Little interest or pleasure in doing things Several days   Q2: Feeling down, depressed or hopeless Several days   PHQ-2 Score 2           4/15/2024   Substance Use   Alcohol more than 3/day or more than  "7/wk No   Do you use any other substances recreationally? No     Social History     Tobacco Use    Smoking status: Never    Smokeless tobacco: Never   Vaping Use    Vaping status: Never Used   Substance Use Topics    Alcohol use: No     Alcohol/week: 0.0 standard drinks of alcohol     Comment:  0-2  wine  glasses monthly, not in PG    Drug use: No                  4/15/2024   STI Screening   New sexual partner(s) since last STI/HIV test? (!) YES      History of abnormal Pap smear: Last 3 Pap and HPV Results:       Latest Ref Rng & Units 7/18/2022     4:46 PM 3/20/2019     4:25 PM 3/20/2019     3:00 PM   PAP / HPV   PAP  Negative for Intraepithelial Lesion or Malignancy (NILM)      PAP (Historical)   NIL     HPV 16 DNA Negative Negative   Negative    HPV 18 DNA Negative Negative   Negative    Other HR HPV Negative Negative   Negative            Latest Ref Rng & Units 7/18/2022     4:46 PM 3/20/2019     4:25 PM 3/20/2019     3:00 PM   PAP / HPV   PAP  Negative for Intraepithelial Lesion or Malignancy (NILM)      PAP (Historical)   NIL     HPV 16 DNA Negative Negative   Negative    HPV 18 DNA Negative Negative   Negative    Other HR HPV Negative Negative   Negative      ASCVD Risk   The ASCVD Risk score (Joe GUEVARA, et al., 2019) failed to calculate for the following reasons:    The valid total cholesterol range is 130 to 320 mg/dL       Reviewed and updated as needed this visit by Provider                          Review of Systems  Constitutional, HEENT, cardiovascular, pulmonary, gi and gu systems are negative, except as otherwise noted.     Objective    Exam  /72   Pulse 63   Resp 16   Ht 1.638 m (5' 4.5\")   Wt 88.9 kg (196 lb)   LMP 11/17/2020 (Exact Date)   SpO2 100%   BMI 33.12 kg/m     Estimated body mass index is 33.12 kg/m  as calculated from the following:    Height as of this encounter: 1.638 m (5' 4.5\").    Weight as of this encounter: 88.9 kg (196 lb).    Physical Exam  GENERAL: alert " and no distress  NECK: no adenopathy, no asymmetry, masses, or scars  RESP: lungs clear to auscultation - no rales, rhonchi or wheezes  CV: regular rate and rhythm, normal S1 S2, no S3 or S4, no murmur, click or rub, no peripheral edema  ABDOMEN: soft, nontender, no hepatosplenomegaly, no masses and bowel sounds normal  MS: no gross musculoskeletal defects noted, no edema        Signed Electronically by: Dmitry Howe MD

## 2024-04-19 ENCOUNTER — LAB (OUTPATIENT)
Dept: LAB | Facility: CLINIC | Age: 39
End: 2024-04-19
Payer: COMMERCIAL

## 2024-04-19 DIAGNOSIS — R73.03 PREDIABETES: ICD-10-CM

## 2024-04-19 DIAGNOSIS — Z00.00 ROUTINE GENERAL MEDICAL EXAMINATION AT A HEALTH CARE FACILITY: ICD-10-CM

## 2024-04-19 LAB
HBA1C MFR BLD: 5.8 % (ref 0–5.6)
HGB BLD-MCNC: 12.7 G/DL (ref 11.7–15.7)

## 2024-04-19 PROCEDURE — 85018 HEMOGLOBIN: CPT

## 2024-04-19 PROCEDURE — 36415 COLL VENOUS BLD VENIPUNCTURE: CPT

## 2024-04-19 PROCEDURE — 80061 LIPID PANEL: CPT

## 2024-04-19 PROCEDURE — 80048 BASIC METABOLIC PNL TOTAL CA: CPT

## 2024-04-19 PROCEDURE — 83036 HEMOGLOBIN GLYCOSYLATED A1C: CPT

## 2024-04-20 LAB
ANION GAP SERPL CALCULATED.3IONS-SCNC: 10 MMOL/L (ref 7–15)
BUN SERPL-MCNC: 10.5 MG/DL (ref 6–20)
CALCIUM SERPL-MCNC: 9.2 MG/DL (ref 8.6–10)
CHLORIDE SERPL-SCNC: 105 MMOL/L (ref 98–107)
CHOLEST SERPL-MCNC: 111 MG/DL
CREAT SERPL-MCNC: 0.73 MG/DL (ref 0.51–0.95)
DEPRECATED HCO3 PLAS-SCNC: 23 MMOL/L (ref 22–29)
EGFRCR SERPLBLD CKD-EPI 2021: >90 ML/MIN/1.73M2
FASTING STATUS PATIENT QL REPORTED: YES
GLUCOSE SERPL-MCNC: 94 MG/DL (ref 70–99)
HDLC SERPL-MCNC: 41 MG/DL
LDLC SERPL CALC-MCNC: 54 MG/DL
NONHDLC SERPL-MCNC: 70 MG/DL
POTASSIUM SERPL-SCNC: 4.2 MMOL/L (ref 3.4–5.3)
SODIUM SERPL-SCNC: 138 MMOL/L (ref 135–145)
TRIGL SERPL-MCNC: 82 MG/DL

## 2024-04-23 ENCOUNTER — ANCILLARY PROCEDURE (OUTPATIENT)
Dept: ULTRASOUND IMAGING | Facility: CLINIC | Age: 39
End: 2024-04-23
Attending: FAMILY MEDICINE
Payer: COMMERCIAL

## 2024-04-23 DIAGNOSIS — R19.05 PERIUMBILICAL SWELLING: ICD-10-CM

## 2024-04-23 DIAGNOSIS — K42.9 UMBILICAL HERNIA WITHOUT OBSTRUCTION AND WITHOUT GANGRENE: Primary | ICD-10-CM

## 2024-04-23 PROCEDURE — 76705 ECHO EXAM OF ABDOMEN: CPT | Mod: TC | Performed by: RADIOLOGY

## 2024-05-06 ENCOUNTER — THERAPY VISIT (OUTPATIENT)
Dept: PHYSICAL THERAPY | Facility: CLINIC | Age: 39
End: 2024-05-06
Attending: FAMILY MEDICINE
Payer: COMMERCIAL

## 2024-05-06 DIAGNOSIS — M25.551 CHRONIC RIGHT HIP PAIN: Primary | ICD-10-CM

## 2024-05-06 DIAGNOSIS — G89.29 CHRONIC RIGHT HIP PAIN: Primary | ICD-10-CM

## 2024-05-06 PROCEDURE — 97140 MANUAL THERAPY 1/> REGIONS: CPT | Mod: GP | Performed by: PHYSICAL THERAPIST

## 2024-05-06 PROCEDURE — 97110 THERAPEUTIC EXERCISES: CPT | Mod: GP | Performed by: PHYSICAL THERAPIST

## 2024-05-06 PROCEDURE — 97161 PT EVAL LOW COMPLEX 20 MIN: CPT | Mod: GP | Performed by: PHYSICAL THERAPIST

## 2024-05-06 ASSESSMENT — ACTIVITIES OF DAILY LIVING (ADL)
WALKING_APPROXIMATELY_10_MINUTES: MODERATE DIFFICULTY
ABILITY_TO_PERFORM_ACTIVITY_WITH_YOUR_NORMAL_TECHNIQUE: MODERATE DIFFICULTY
HEAVY_WORK: MODERATE DIFFICULTY
HOW_WOULD_YOU_RATE_YOUR_CURRENT_LEVEL_OF_FUNCTION?: NEARLY NORMAL
HOS_ADL_ITEM_SCORE_TOTAL: 44
ADL_HIGHEST_POTENTIAL_SCORE: 68
DEEP SQUATTING: MODERATE DIFFICULTY
SPORTS_SCORE(%): 0
WALKING_DOWN_STEEP_HILLS: SLIGHT DIFFICULTY
PLEASE_INDICATE_YOR_PRIMARY_REASON_FOR_REFERRAL_TO_THERAPY:: HIP
WALKING_DOWN_STEEP_HILLS: SLIGHT DIFFICULTY
SPORTS_TOTAL_ITEM_SCORE: 0
GOING_UP_1_FLIGHT_OF_STAIRS: SLIGHT DIFFICULTY
DEEP_SQUATTING: MODERATE DIFFICULTY
STEPPING_UP_AND_DOWN_CURBS: NO DIFFICULTY AT ALL
RECREATIONAL_ACTIVITIES: MODERATE DIFFICULTY
WALKING_UP_STEEP_HILLS: SLIGHT DIFFICULTY
CUTTING/LATERAL_MOVEMENTS: SLIGHT DIFFICULTY
HEAVY_WORK: MODERATE DIFFICULTY
STANDING FOR 15 MINUTES: NO DIFFICULTY AT ALL
GETTING_INTO_AND_OUT_OF_AN_AVERAGE_CAR: SLIGHT DIFFICULTY
LANDING: SLIGHT DIFFICULTY
SPORTS_COUNT: 9
ADL_COUNT: 17
TWISTING/PIVOTING_ON_INVOLVED_LEG: MODERATE DIFFICULTY
ADL_TOTAL_ITEM_SCORE: 0
ROLLING OVER IN BED: MODERATE DIFFICULTY
ROLLING_OVER_IN_BED: MODERATE DIFFICULTY
WALKING_UP_STEEP_HILLS: SLIGHT DIFFICULTY
GOING UP 1 FLIGHT OF STAIRS: SLIGHT DIFFICULTY
HOS_ADL_HIGHEST_POTENTIAL_SCORE: 68
SITTING FOR 15 MINUTES: MODERATE DIFFICULTY
GETTING_INTO_AND_OUT_OF_A_BATHTUB: SLIGHT DIFFICULTY
STEPPING UP AND DOWN CURBS: NO DIFFICULTY AT ALL
WALKING_INITIALLY: MODERATE DIFFICULTY
GETTING_INTO_AND_OUT_OF_A_BATHTUB: SLIGHT DIFFICULTY
SITTING_FOR_15_MINUTES: MODERATE DIFFICULTY
LIGHT_TO_MODERATE_WORK: MODERATE DIFFICULTY
WALKING_INITIALLY: MODERATE DIFFICULTY
LOW_IMPACT_ACTIVITIES_LIKE_FAST_WALKING: MODERATE DIFFICULTY
ADL_SCORE(%): 0
PUTTING ON SOCKS AND SHOES: SLIGHT DIFFICULTY
LIGHT_TO_MODERATE_WORK: MODERATE DIFFICULTY
TWISTING/PIVOTING ON INVOLVED LEG: MODERATE DIFFICULTY
STANDING_FOR_15_MINUTES: NO DIFFICULTY AT ALL
STARTING_AND_STOPPING_QUICKLY: MODERATE DIFFICULTY
GOING DOWN 1 FLIGHT OF STAIRS: SLIGHT DIFFICULTY
RECREATIONAL ACTIVITIES: MODERATE DIFFICULTY
WALKING_FOR_APPROXIMATELY_10_MINUTES: MODERATE DIFFICULTY
WALKING_15_MINUTES_OR_GREATER: MODERATE DIFFICULTY
WALKING_15_MINUTES_OR_GREATER: MODERATE DIFFICULTY
GOING_DOWN_1_FLIGHT_OF_STAIRS: SLIGHT DIFFICULTY
JUMPING: SLIGHT DIFFICULTY
SPORTS_HIGHEST_POTENTIAL_SCORE: 36
GETTING INTO AND OUT OF AN AVERAGE CAR: SLIGHT DIFFICULTY
HOS_ADL_SCORE(%): 64.71
PUTTING_ON_SOCKS_AND_SHOES: SLIGHT DIFFICULTY
ABILITY_TO_PARTICIPATE_IN_YOUR_DESIRED_SPORT_AS_LONG_AS_YOU_WOULD_LIKE: SLIGHT DIFFICULTY

## 2024-05-06 NOTE — PROGRESS NOTES
PHYSICAL THERAPY EVALUATION  Type of Visit: Evaluation    See electronic medical record for Abuse and Falls Screening details.    Subjective       Presenting condition or subjective complaint: Mild hip arthritis, hip feels stiff after sitting for a long time, particularly when sitting in an uncomfortable chair. Walking initially after prolonged sitting feels stiff and uncomfortable so the patient demonstrates walking with internal hip rotation to compensate for the pain.  does report her hip loosens up after walking for several minutes  Date of onset: 04/15/24    Relevant medical history:     Dates & types of surgery: 1995, Appendix removed. 2019, CS. 2021, CS    Prior diagnostic imaging/testing results: X-ray     Prior therapy history for the same diagnosis, illness or injury: No      Prior Level of Function  Transfers:   Ambulation:   ADL:   IADL:     Living Environment  Social support: Alone   Type of home: Apartment/condo   Stairs to enter the home: Yes 3 Is there a railing: Yes   Ramp: No   Stairs inside the home: No       Help at home: None  Equipment owned:       Employment: Yes Nursing Assistant  Hobbies/Interests:      Patient goals for therapy: Walk normally without hip pain from sitting down.    Pain assessment:      Objective   HIP EVALUATION  PAIN: Pain Level at Rest: 1/10  Pain Level with Use: 9/10  Pain Quality: Pressure, Tender, and rubbing pain  Pain is Exacerbated By: sitting on a hard chair, walking initially  Pain is Relieved By: use and walking  Pain Progression: Unchanged  INTEGUMENTARY (edema, incisions):   POSTURE:   GAIT:   Weightbearing Status:   Assistive Device(s):   Gait Deviations:   BALANCE/PROPRIOCEPTION:   WEIGHTBEARING ALIGNMENT:   NON-WEIGHTBEARING ALIGNMENT:    ROM:   (Degrees) Left AROM Left PROM  Right AROM Right PROM   Hip Flexion   94 (mod pain)    Hip Extension   14 (stretching pain)    Hip Abduction   WNL    Hip Adduction       Hip Internal Rotation   28 (pain at endrange)     Hip External Rotation   39 (stretching pain)    Knee Flexion       Knee Extension       Lumbar Side glide     Lumbar Flexion    Lumbar Extension    Pain:   End feel:     PELVIC/SI SCREEN:   STRENGTH:   Pain: - none + mild ++ moderate +++ severe  Strength Scale: 0-5/5 Left Right   Hip Flexion  4, ++ (mod)   Hip Extension  4, + (mild)   Hip Abduction  4-, ++ (mod)   Hip Adduction  4, + (mild)   Hip Internal Rotation  4, ++ (mod)   Hip External Rotation  4, + (mild)   Knee Flexion     Knee Extension       LE FLEXIBILITY:   SPECIAL TESTS:    Left Right   DANG  Positive   FADIR/Labrum/RALEIGH  Positive   Femoral Nerve     Eran's     Piriformis  Positive   Quadrant Testing     SLR     Slump     Stork with Extension     Chan  Positive          FUNCTIONAL TESTS:   PALPATION:   Joint mobility: hypomobile inferior, posterior, anterior, lateral    Assessment & Plan   CLINICAL IMPRESSIONS  Medical Diagnosis: Hip pain, right    Treatment Diagnosis: chronic right hip pain   Impression/Assessment: Patient is a 39 year old female with right hip pain complaints.  The following significant findings have been identified: Pain, Decreased ROM/flexibility, Decreased joint mobility, Decreased strength, and Decreased activity tolerance. These impairments interfere with their ability to perform self care tasks, work tasks, recreational activities, and community mobility as compared to previous level of function.     Clinical Decision Making (Complexity):  Clinical Presentation: Stable/Uncomplicated  Clinical Presentation Rationale: based on medical and personal factors listed in PT evaluation  Clinical Decision Making (Complexity): Low complexity    PLAN OF CARE  Treatment Interventions:  Interventions: Manual Therapy, Neuromuscular Re-education, Therapeutic Activity, Therapeutic Exercise    Long Term Goals     PT Goal 1  Goal Identifier: ambulation  Goal Description: the patient will be able to independently walk on a level surface for 30  minutes noting a pain level in her hip of 1/10 or less  Rationale: to maximize safety and independence with performance of ADLs and functional tasks;to maximize safety and independence within the community  Goal Progress: currently notes that with initial walking she will experience 9/10 pain  Target Date: 07/01/24      Frequency of Treatment: 1x daily per week  Duration of Treatment: 8 weeks    Recommended Referrals to Other Professionals:   Education Assessment:        Risks and benefits of evaluation/treatment have been explained.   Patient/Family/caregiver agrees with Plan of Care.     Evaluation Time:     PT Wilson, Low Complexity Minutes ([id number]): 19       Signing Clinician: IZZY DIOR

## 2024-05-13 ENCOUNTER — THERAPY VISIT (OUTPATIENT)
Dept: PHYSICAL THERAPY | Facility: CLINIC | Age: 39
End: 2024-05-13
Payer: COMMERCIAL

## 2024-05-13 DIAGNOSIS — M25.551 CHRONIC RIGHT HIP PAIN: Primary | ICD-10-CM

## 2024-05-13 DIAGNOSIS — G89.29 CHRONIC RIGHT HIP PAIN: Primary | ICD-10-CM

## 2024-05-13 PROCEDURE — 97140 MANUAL THERAPY 1/> REGIONS: CPT | Mod: GP | Performed by: PHYSICAL THERAPIST

## 2024-05-13 PROCEDURE — 97110 THERAPEUTIC EXERCISES: CPT | Mod: GP | Performed by: PHYSICAL THERAPIST

## 2024-05-28 ENCOUNTER — TELEPHONE (OUTPATIENT)
Dept: PHYSICAL THERAPY | Facility: CLINIC | Age: 39
End: 2024-05-28

## 2024-05-28 NOTE — TELEPHONE ENCOUNTER
The patient did not arrive for her 2:10 appointment, called and spoke with the patient. I informed the patient of our clinic's 2 no show policy and since the patient has no-showed 3 times we unfortunately had to move her to same day appointments only and cancelled her remaining appointment. The patient verbalizes understanding and notes she will reach out to the clinic if she needs a same day appointment.

## 2024-06-18 ENCOUNTER — OFFICE VISIT (OUTPATIENT)
Dept: URGENT CARE | Facility: URGENT CARE | Age: 39
End: 2024-06-18
Payer: COMMERCIAL

## 2024-06-18 ENCOUNTER — NURSE TRIAGE (OUTPATIENT)
Dept: FAMILY MEDICINE | Facility: CLINIC | Age: 39
End: 2024-06-18
Payer: COMMERCIAL

## 2024-06-18 ENCOUNTER — MYC MEDICAL ADVICE (OUTPATIENT)
Dept: FAMILY MEDICINE | Facility: CLINIC | Age: 39
End: 2024-06-18
Payer: COMMERCIAL

## 2024-06-18 VITALS
TEMPERATURE: 97.4 F | BODY MASS INDEX: 33.39 KG/M2 | DIASTOLIC BLOOD PRESSURE: 78 MMHG | HEART RATE: 66 BPM | RESPIRATION RATE: 16 BRPM | WEIGHT: 197.6 LBS | OXYGEN SATURATION: 100 % | SYSTOLIC BLOOD PRESSURE: 113 MMHG

## 2024-06-18 DIAGNOSIS — G57.02 PYRIFORMIS SYNDROME, LEFT: Primary | ICD-10-CM

## 2024-06-18 PROCEDURE — 99213 OFFICE O/P EST LOW 20 MIN: CPT | Performed by: PHYSICIAN ASSISTANT

## 2024-06-18 RX ORDER — METHYLPREDNISOLONE 4 MG
TABLET, DOSE PACK ORAL
Qty: 21 TABLET | Refills: 0 | Status: SHIPPED | OUTPATIENT
Start: 2024-06-18 | End: 2024-08-06

## 2024-06-18 RX ORDER — CYCLOBENZAPRINE HCL 10 MG
10 TABLET ORAL
Qty: 20 TABLET | Refills: 0 | Status: SHIPPED | OUTPATIENT
Start: 2024-06-18 | End: 2024-07-08

## 2024-06-18 ASSESSMENT — PAIN SCALES - GENERAL: PAINLEVEL: WORST PAIN (10)

## 2024-06-18 NOTE — PROGRESS NOTES
Chief Complaint   Patient presents with    Pain     Pain in left thigh that moves down knee, no know injury.           ASSESSMENT:    ICD-10-CM    1. Pyriformis syndrome, left  G57.02 cyclobenzaprine (FLEXERIL) 10 MG tablet     methylPREDNISolone (MEDROL DOSEPAK) 4 MG tablet therapy pack     Orthopedic  Referral            PLAN: Left piriformis syndrome with inflammation of the sciatic nerve.  Medrol Dosepak.  Muscle relaxant at bedtime, may cause drowsiness.  Try ice daily.  Follow-up with Ortho 3 to 5 days.  Discussed concerning signs or symptoms that would warrant visit to the emergency room.  Low suspicion for DVT according to Joseph JASON Calc score      Sonja Ruiz PA-C        SUBJECTIVE:  Ebony Jo is an 39 year old female who presents with left buttock pain for 48 to 72 hours.  Cannot recall if she woke up with it or not.  No injury but had carried a box up the stairs.  No lower extremity numbness or tingling.  No midline low back pain.  No fever or chills.  No calf pain or swelling.  History of mild arthritis right hip for which she just underwent physical therapy.  Tylenol did not help.  Massage made it worse.  Left buttock pain radiates down the back of the thigh.    Past Medical History:   Diagnosis Date    Hemifacial spasm, right 09/26/2013    stress-induced    Malaria 2009    Migraine without aura 9/26/2013    Seasonal allergies      History   Smoking Status    Never   Smokeless Tobacco    Never       ROS:  GEN no fevers  SKIN no erythema  Musculoskeletal:  See HPI.      OBJECTIVE:  Blood pressure 113/78, pulse 66, temperature 97.4  F (36.3  C), temperature source Tympanic, resp. rate 16, weight 89.6 kg (197 lb 9.6 oz), last menstrual period 11/17/2020, SpO2 100%, not currently breastfeeding.  Patient is alert and NAD.  EYES: conjunctiva clear  Leg Exam (left):  Inspection: No leg swelling noted.  Palpation: No calf tenderness.  Lumbar spine nontender to palpation.  Moderate to  severe tenderness over the left sciatic notch directly over the piriformis muscle.  No trochanteric tenderness.  Full range of motion of the hip.  Cap refill intact.    Negative Homans.    Good doralis pedis.  Neurovascularly Intact Distally.         Sonja Ruiz PA-C

## 2024-06-18 NOTE — LETTER
June 18, 2024      Ebony Jo  [address]   [place] [address]        To Whom It May Concern:    Ebony Jo  was seen on 6/18/2024.  Please excuse from work 6/19/2024.        Sincerely,        Sonja Ruiz PA-C

## 2024-06-18 NOTE — TELEPHONE ENCOUNTER
Patient was called in response to the below Clout message:    Good morning Dr Andres  Hope you are doing well. I have this excruciating pain in my upper left tight and when I try to massage or press on it, it hurts really bad and feels it is connected to my bone, if feels like it's just below my left hip joint. It feels really bad when I sit down and have to get up after a few minutes. It started about 3 days ago and it is not letting up. I tried Tylenol but it doesn't seem to be helping. Advice please, what else can I take for it? It is so bad that I don't think I will be able to go to work today.   Thank you.    Patient states this pain has been going on for 2 days.  She states it is much worse when she sits down and it gets better when she straightens the leg but it doesn't go away.  Patient denies any redness or swelling in the area.  Does not remember any injury to the site.  Patient denies chest pain, shortness of breath or fever.      Recommended Disposition: See in Urgent Care/ED or Call [facility].  Writer called [facility] and they would not be able to see the patient until tomorrow morning.  Patient stated she would just try Urgent Care then tonight instead of waiting for tomorrow morning.  Writer notified [facility] staff about this. Patient was going to go right away.        Reason for Disposition   Thigh or calf pain in only one leg and present > 1 hour    Additional Information   Negative: Looks like a broken bone or dislocated joint (e.g., crooked or deformed)   Negative: Sounds like a life-threatening emergency to the triager   Negative: Followed a hip injury   Negative: Followed a knee injury   Negative: Followed an ankle or foot injury   Negative: Back pain radiating (shooting) into leg(s)   Negative: Foot pain is main symptom   Negative: Ankle pain is main symptom   Negative: Knee pain is main symptom   Negative: Leg swelling is main symptom   Negative: Chest pain   Negative: Difficulty breathing   Negative:  "Entire foot is cool or blue in comparison to other side   Negative: Unable to walk   Negative: Fever and red area (or area very tender to touch)   Negative: Swollen joint and fever    Answer Assessment - Initial Assessment Questions  1. ONSET: \"When did the pain start?\"       Couple days ago  2. LOCATION: \"Where is the pain located?\"       Upper left thigh  3. PAIN: \"How bad is the pain?\"    (Scale 1-10; or mild, moderate, severe)    -  MILD (1-3): doesn't interfere with normal activities     -  MODERATE (4-7): interferes with normal activities (e.g., work or school) or awakens from sleep, limping     -  SEVERE (8-10): excruciating pain, unable to do any normal activities, unable to walk      Severe pain with sitting, better when leg was straight but not gone  4. WORK OR EXERCISE: \"Has there been any recent work or exercise that involved this part of the body?\"       Nothing, carried up a box from floor 1 to floor 3, not super heavy per patient  5. CAUSE: \"What do you think is causing the leg pain?\"      No idea  6. OTHER SYMPTOMS: \"Do you have any other symptoms?\" (e.g., chest pain, back pain, breathing difficulty, swelling, rash, fever, numbness, weakness)      No  7. PREGNANCY: \"Is there any chance you are pregnant?\" \"When was your last menstrual period?\"      No    Protocols used: Leg Pain-A-OH    "

## 2024-06-19 NOTE — PROGRESS NOTES
Ebony Jo  :  1985  DOS: 2024  MRN: [id number]  PCP: Dmitry Yoo    Sports Medicine Clinic Visit      HPI  Ebony Jo is a 39 year old female who is seen as a self referral presenting with left hip pain.    - Mechanism of Injury:    - Insidious onset of pain that patient first noticed after carrying a box up the stairs  - Pertinent history and prior evaluations:    - was seen in  on 24  - 2024  visit: Left buttock pain after carrying a box up the stairs. Denies numbness/tingling, back pain, calf pain or swelling. Diagnosed with left piriformis syndrome causing sciatica symptoms. Low suspicion for DVT. Given Medrol Dosepak and muscle relaxant for at bed time. Ice daily. Follow up with Ortho.     - Pain Character:    - Location: left lower lumbar spine, SI joint, buttocks with radiating pain/muscle tension to left posterior thigh  - Character:  muscle tension, burning, aching pain  - Duration:  6 days (24)  - Course:  worsening  - Endorses:    - tingling, radicular shooting pain, muscle tension  - Denies:    - swelling, clicking/popping, grinding, mechanical locking symptoms, instability, weakness lower extremity numbness, tingling, midline low back pain, fever, chills, calf swelling  - Alleviating factors:    - activity modifications, standing  - Aggravating factors:    -  driving, sitting/going from sit to stand, bending at waist,  massage  - Other treatments tried:    -  heat, ice, massage, stretching  other medications: Medrol Dose pack, Cyclobenzaprine,Tylenol,     - Patient Goals:    - be able to manage the symptoms successfully, discuss treatment options, return to work   - Social History:   - Employed:  CNA for [employer]      Review of Systems  Musculoskeletal: as above  Remainder of review of systems is negative including constitutional, CV, pulmonary, GI, Skin and Neurologic except as noted in HPI or medical history.    Past  "Medical History:   Diagnosis Date    Hemifacial spasm, right 2013    stress-induced    Malaria     Migraine without aura 2013    Seasonal allergies      Past Surgical History:   Procedure Laterality Date    APPENDECTOMY       SECTION       SECTION, TUBAL LIGATION, COMBINED       Family History   Problem Relation Age of Onset    Diabetes Mother     Alzheimer Disease Father     Diabetes Brother          Objective  Ht 1.638 m (5' 4.5\")   Wt 89.4 kg (197 lb)   LMP 2020 (Exact Date)   BMI 33.29 kg/m      General: healthy, alert and in no acute distress.    HEENT: no scleral icterus or conjunctival erythema.   Skin: no suspicious lesions or rash. No jaundice.   CV: regular rhythm by palpation, 2+ distal pulses.  Resp: normal respiratory effort without conversational dyspnea.   Psych: normal mood and affect.    Gait: antalgic favoring left leg, appropriate coordination and balance.    Neuro:        - Sensation to light touch:    - Diminished sensation in the proximal posterior thigh. Otherwise SILT in all other nerve distributions.        - MSR:      RLE  LLE  - Patella 2+ 2+  - Achilles 2+ 2+       - Special tests:   - Slump/SLR: Slightly positive on the left    MSK - Hip:       - Inspection:    - No significant swelling, erythema, warmth, ecchymosis, lesion.        - ROM:    - Full AROM/PROM with pain during standing, lumbar flexion, resisted hip internal rotation, passive hip external rotation, hip extension and knee flexion, hip adduction       - Palpation:    - TTP at the piriformis, posterior greater trochanter, slightly at the ischial tuberosity, slightly at the left SI joint.   - NTTP elsewhere.        - Strength:  (*antalgic)  RLE LLE  - Hip Flexion  5 5   - Hip Extension 5 5   - Hip Abduction 5 5   - Hip Adduction 5 5  - Knee Flexion  5 5-*  - Knee Extension 5 5  - Dorsiflexion  5 5-*  - Plantarflexion 5 5-*       - Special tests:   - Log roll:  Neg   - Resisted " SLR:  Neg   - FADIR/Scour:  Neg, but did cause some buttocks pain   - DANG: Cause some buttocks pain   - Piriformis stress: Caused sciatica symptoms and pain      Radiology  I independently reviewed the available relevant imaging in the chart with my interpretations as above in HPI.     I independently reviewed today's new relevant imaging, with the following interpretation:  - Pre-clinic lumbar spine x-rays were ordered for suspicion of lumbosacral radiculopathy.  XR lumbar spine 6/21/2024 shows normal anatomic alignment with good preservation of disc height throughout the lumbar spine, no significant degenerative changes, no significant foraminal narrowing.      Assessment  1. Pyriformis syndrome, left    2. Acute left-sided low back pain with left-sided sciatica        Plan  Ebony Jo is a pleasant 39 year old female that presents with acute left-sided buttocks pain with radiating pain in the posterior thigh.  She has been experiencing pain for about the past week after lifting a heavy box up the stairs and felt a sharp pain on the left buttocks.  The pain has remained with intermittent symptoms of radicular shooting pain into the posterior thigh and numbness/tingling in the bottom of the foot with prolonged sitting, sit to stand transition, walking occasionally and hip internal rotation activities.  Denies significant back pain, or neurologic red flag symptoms such as bowel/bladder incontinence, saddle anesthesia, balance difficulties.  No major weakness.  On exam, she is TTP at the piriformis and has positive sciatica symptoms with piriformis stretch and activation.  History and physical exam appear most consistent with acute piriformis syndrome.     We discussed the nature of the condition and available treatment options, and mutually agreed upon the following plan:    - Imaging:          - Reviewed and independently interpreted the relevant imaging in the chart, including any imaging ordered for today's  clinic.  - Reviewed results and images with patient.   - Medications:          - Discussed pharmacologic options for pain relief.   - May use NSAIDs (Ibuprofen, Naproxen) or Acetaminophen (Tylenol) as needed for pain control.   - Do not take these if previously advised to avoid them for other medical conditions.  - May also use topical medications such as lidocaine, IcyHot, BioFreeze, or Voltaren gel as needed for pain control.    - Voltaren gel is an anti-inflammatory cream that may be used up to 4 times per day over the painful area.   - Injections:          - Discussed possible injection options and alternatives.    - Injection options include: Piriformis corticosteroid injection if symptoms persist despite conservative care    - Deferred injections today and will consider them in the future as needed.   - Therapy:          - Discussed the benefits of therapy vs home exercise program for optimization of range of motion, flexibility, strength, stability and function.   - Preference is for therapy.   -Physical therapy referral placed today and instructed to call [phone] to schedule appointments.   - Modalities:          - May use ice, heat, massage or other modalities as needed.   - Activity:          - Encouraged to remain active and participate in regular activities as symptoms allow.   Avoid or modify exacerbating activities as needed.   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  I recommended taking the weekend off and getting started with physical therapy next week.  Work letter given for excused work duties until Monday.  - Follow up:          - As needed for re-evaluation and update to treatment plan.  - May follow up sooner for new/worsening symptoms.  - May contact clinic by phone or MyChart for questions or concerns.       Elfego Mackenzie DO, JENNIFER  [facility] - Sports Medicine  [facility] - Department of Orthopedic  Surgery       Disclaimer:  This note was prepared and written using Dragon Medical dictation software. As a result, there may be errors in the script that have gone undetected. Please consider this when interpreting the information in this note.

## 2024-06-21 ENCOUNTER — ANCILLARY PROCEDURE (OUTPATIENT)
Dept: GENERAL RADIOLOGY | Facility: CLINIC | Age: 39
End: 2024-06-21
Attending: STUDENT IN AN ORGANIZED HEALTH CARE EDUCATION/TRAINING PROGRAM
Payer: COMMERCIAL

## 2024-06-21 ENCOUNTER — OFFICE VISIT (OUTPATIENT)
Dept: ORTHOPEDICS | Facility: CLINIC | Age: 39
End: 2024-06-21
Payer: COMMERCIAL

## 2024-06-21 VITALS — WEIGHT: 197 LBS | BODY MASS INDEX: 32.82 KG/M2 | HEIGHT: 65 IN

## 2024-06-21 DIAGNOSIS — M54.42 ACUTE LEFT-SIDED LOW BACK PAIN WITH LEFT-SIDED SCIATICA: ICD-10-CM

## 2024-06-21 DIAGNOSIS — G57.02 PYRIFORMIS SYNDROME, LEFT: Primary | ICD-10-CM

## 2024-06-21 PROCEDURE — 99204 OFFICE O/P NEW MOD 45 MIN: CPT | Performed by: STUDENT IN AN ORGANIZED HEALTH CARE EDUCATION/TRAINING PROGRAM

## 2024-06-21 PROCEDURE — 72100 X-RAY EXAM L-S SPINE 2/3 VWS: CPT | Performed by: RADIOLOGY

## 2024-06-21 RX ORDER — METHOCARBAMOL 500 MG/1
500 TABLET, FILM COATED ORAL 4 TIMES DAILY PRN
Qty: 60 TABLET | Refills: 0 | Status: SHIPPED | OUTPATIENT
Start: 2024-06-21 | End: 2024-07-06

## 2024-06-21 NOTE — LETTER
2024      Ebony Jo  [address]  [place] [address]      Dear Colleague,    Thank you for referring your patient, Ebony Jo, to the [facility] [place]. Please see a copy of my visit note below.    Ebony Jo  :  1985  DOS: 2024  MRN: [id number]  PCP: Dmitry Yoo    Sports Medicine Clinic Visit      HPI  Ebony Jo is a 39 year old female who is seen as a self referral presenting with left hip pain.    - Mechanism of Injury:    - Insidious onset of pain that patient first noticed after carrying a box up the stairs  - Pertinent history and prior evaluations:    - was seen in UC on 24  - 2024  visit: Left buttock pain after carrying a box up the stairs. Denies numbness/tingling, back pain, calf pain or swelling. Diagnosed with left piriformis syndrome causing sciatica symptoms. Low suspicion for DVT. Given Medrol Dosepak and muscle relaxant for at bed time. Ice daily. Follow up with Ortho.     - Pain Character:    - Location: left lower lumbar spine, SI joint, buttocks with radiating pain/muscle tension to left posterior thigh  - Character:  muscle tension, burning, aching pain  - Duration:  6 days (24)  - Course:  worsening  - Endorses:    - tingling, radicular shooting pain, muscle tension  - Denies:    - swelling, clicking/popping, grinding, mechanical locking symptoms, instability, weakness lower extremity numbness, tingling, midline low back pain, fever, chills, calf swelling  - Alleviating factors:    - activity modifications, standing  - Aggravating factors:    -  driving, sitting/going from sit to stand, bending at waist,  massage  - Other treatments tried:    -  heat, ice, massage, stretching  other medications: Medrol Dose pack, Cyclobenzaprine,Tylenol,     - Patient Goals:    - be able to manage the symptoms successfully, discuss treatment options, return to work   - Social History:   -  "Employed:  CNA for [employer]      Review of Systems  Musculoskeletal: as above  Remainder of review of systems is negative including constitutional, CV, pulmonary, GI, Skin and Neurologic except as noted in HPI or medical history.    Past Medical History:   Diagnosis Date     Hemifacial spasm, right 2013    stress-induced     Malaria      Migraine without aura 2013     Seasonal allergies      Past Surgical History:   Procedure Laterality Date     APPENDECTOMY        SECTION        SECTION, TUBAL LIGATION, COMBINED       Family History   Problem Relation Age of Onset     Diabetes Mother      Alzheimer Disease Father      Diabetes Brother          Objective  Ht 1.638 m (5' 4.5\")   Wt 89.4 kg (197 lb)   LMP 2020 (Exact Date)   BMI 33.29 kg/m      General: healthy, alert and in no acute distress.    HEENT: no scleral icterus or conjunctival erythema.   Skin: no suspicious lesions or rash. No jaundice.   CV: regular rhythm by palpation, 2+ distal pulses.  Resp: normal respiratory effort without conversational dyspnea.   Psych: normal mood and affect.    Gait: antalgic favoring left leg, appropriate coordination and balance.    Neuro:        - Sensation to light touch:    - Diminished sensation in the proximal posterior thigh. Otherwise SILT in all other nerve distributions.        - MSR:      RLE  LLE  - Patella 2+ 2+  - Achilles 2+ 2+       - Special tests:   - Slump/SLR: Slightly positive on the left    MSK - Hip:       - Inspection:    - No significant swelling, erythema, warmth, ecchymosis, lesion.        - ROM:    - Full AROM/PROM with pain during standing, lumbar flexion, resisted hip internal rotation, passive hip external rotation, hip extension and knee flexion, hip adduction       - Palpation:    - TTP at the piriformis, posterior greater trochanter, slightly at the ischial tuberosity, slightly at the left SI joint.   - NTTP elsewhere.        " - Strength:  (*antalgic)  RLE LLE  - Hip Flexion  5 5   - Hip Extension 5 5   - Hip Abduction 5 5   - Hip Adduction 5 5  - Knee Flexion  5 5-*  - Knee Extension 5 5  - Dorsiflexion  5 5-*  - Plantarflexion 5 5-*       - Special tests:   - Log roll:  Neg   - Resisted SLR:  Neg   - FADIR/Scour:  Neg, but did cause some buttocks pain   - DANG: Cause some buttocks pain   - Piriformis stress: Caused sciatica symptoms and pain      Radiology  I independently reviewed the available relevant imaging in the chart with my interpretations as above in HPI.     I independently reviewed today's new relevant imaging, with the following interpretation:  - Pre-clinic lumbar spine x-rays were ordered for suspicion of lumbosacral radiculopathy.  XR lumbar spine 6/21/2024 shows normal anatomic alignment with good preservation of disc height throughout the lumbar spine, no significant degenerative changes, no significant foraminal narrowing.      Assessment  1. Pyriformis syndrome, left    2. Acute left-sided low back pain with left-sided sciatica        Plan  Ebony Jo is a pleasant 39 year old female that presents with acute left-sided buttocks pain with radiating pain in the posterior thigh.  She has been experiencing pain for about the past week after lifting a heavy box up the stairs and felt a sharp pain on the left buttocks.  The pain has remained with intermittent symptoms of radicular shooting pain into the posterior thigh and numbness/tingling in the bottom of the foot with prolonged sitting, sit to stand transition, walking occasionally and hip internal rotation activities.  Denies significant back pain, or neurologic red flag symptoms such as bowel/bladder incontinence, saddle anesthesia, balance difficulties.  No major weakness.  On exam, she is TTP at the piriformis and has positive sciatica symptoms with piriformis stretch and activation.  History and physical exam appear most consistent with acute piriformis  syndrome.     We discussed the nature of the condition and available treatment options, and mutually agreed upon the following plan:    - Imaging:          - Reviewed and independently interpreted the relevant imaging in the chart, including any imaging ordered for today's clinic.  - Reviewed results and images with patient.   - Medications:          - Discussed pharmacologic options for pain relief.   - May use NSAIDs (Ibuprofen, Naproxen) or Acetaminophen (Tylenol) as needed for pain control.   - Do not take these if previously advised to avoid them for other medical conditions.  - May also use topical medications such as lidocaine, IcyHot, BioFreeze, or Voltaren gel as needed for pain control.    - Voltaren gel is an anti-inflammatory cream that may be used up to 4 times per day over the painful area.   - Injections:          - Discussed possible injection options and alternatives.    - Injection options include: Piriformis corticosteroid injection if symptoms persist despite conservative care    - Deferred injections today and will consider them in the future as needed.   - Therapy:          - Discussed the benefits of therapy vs home exercise program for optimization of range of motion, flexibility, strength, stability and function.   - Preference is for therapy.   -Physical therapy referral placed today and instructed to call [phone] to schedule appointments.   - Modalities:          - May use ice, heat, massage or other modalities as needed.   - Activity:          - Encouraged to remain active and participate in regular activities as symptoms allow.   Avoid or modify exacerbating activities as needed.   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  I recommended taking the weekend off and getting started with physical therapy next week.  Work letter given for excused work duties until Monday.  - Follow up:          - As needed for re-evaluation and update  to treatment plan.  - May follow up sooner for new/worsening symptoms.  - May contact clinic by phone or MyChart for questions or concerns.       Elfego Mackenzie DO, CAQSM  [facility] - Sports Medicine  [facility] - Department of Orthopedic Surgery       Disclaimer:  This note was prepared and written using Dragon Medical dictation software. As a result, there may be errors in the script that have gone undetected. Please consider this when interpreting the information in this note.       Again, thank you for allowing me to participate in the care of your patient.        Sincerely,        Elfego Mackenzie DO

## 2024-06-21 NOTE — LETTER
June 21, 2024      Ebony RIZZO Titome  [address]   [place] [address]        To Whom It May Concern,     Ebony is currently under my care.  She should considered medically excused from work until 6/24/24 to facilitate healing.  She may then return to work at that time with no formal work restrictions.  Updated restrictions will be provided as needed.          Sincerely,            Elfego Mackenzie, DO

## 2024-08-06 ENCOUNTER — OFFICE VISIT (OUTPATIENT)
Dept: URGENT CARE | Facility: URGENT CARE | Age: 39
End: 2024-08-06
Payer: COMMERCIAL

## 2024-08-06 ENCOUNTER — ANCILLARY PROCEDURE (OUTPATIENT)
Dept: GENERAL RADIOLOGY | Facility: CLINIC | Age: 39
End: 2024-08-06
Attending: PHYSICIAN ASSISTANT
Payer: COMMERCIAL

## 2024-08-06 VITALS
WEIGHT: 197.5 LBS | SYSTOLIC BLOOD PRESSURE: 120 MMHG | DIASTOLIC BLOOD PRESSURE: 76 MMHG | HEART RATE: 83 BPM | BODY MASS INDEX: 33.38 KG/M2 | OXYGEN SATURATION: 97 % | RESPIRATION RATE: 16 BRPM | TEMPERATURE: 98.6 F

## 2024-08-06 DIAGNOSIS — J20.9 ACUTE BRONCHITIS, UNSPECIFIED ORGANISM: Primary | ICD-10-CM

## 2024-08-06 PROCEDURE — 87635 SARS-COV-2 COVID-19 AMP PRB: CPT | Performed by: PHYSICIAN ASSISTANT

## 2024-08-06 PROCEDURE — 71046 X-RAY EXAM CHEST 2 VIEWS: CPT | Mod: TC | Performed by: RADIOLOGY

## 2024-08-06 PROCEDURE — 99214 OFFICE O/P EST MOD 30 MIN: CPT | Performed by: PHYSICIAN ASSISTANT

## 2024-08-06 RX ORDER — BENZONATATE 200 MG/1
200 CAPSULE ORAL 3 TIMES DAILY PRN
Qty: 30 CAPSULE | Refills: 0 | Status: SHIPPED | OUTPATIENT
Start: 2024-08-06 | End: 2024-08-16

## 2024-08-06 RX ORDER — AZITHROMYCIN 250 MG/1
TABLET, FILM COATED ORAL
Qty: 6 TABLET | Refills: 0 | Status: SHIPPED | OUTPATIENT
Start: 2024-08-06

## 2024-08-06 ASSESSMENT — ENCOUNTER SYMPTOMS
CHILLS: 0
SINUS PAIN: 0
WHEEZING: 0
CARDIOVASCULAR NEGATIVE: 1
HEADACHES: 1
DIARRHEA: 0
RHINORRHEA: 1
FEVER: 1
PALPITATIONS: 0
SORE THROAT: 0
ARTHRALGIAS: 1
FATIGUE: 1
DIZZINESS: 1
SINUS PRESSURE: 0
NAUSEA: 0
VOMITING: 0
SHORTNESS OF BREATH: 0
COUGH: 1

## 2024-08-06 NOTE — LETTER
August 6, 2024      Ebony Jo  [address]   [place] [address]        To Whom It May Concern:    Ebony Jo was seen in urgent care clinic today and is unable to work from 8/6/24-8/8/24 due to her symptoms.  Please feel free to contact me via phone if you have any questions or concerns.        Sincerely,      Lety See GLADYS Good

## 2024-08-07 LAB — SARS-COV-2 RNA RESP QL NAA+PROBE: POSITIVE

## 2024-08-07 NOTE — PROGRESS NOTES
"  Vinicio Beck is a 39 year old, presenting for the following health issues:  Headache (Three days of headache, dizziness, weak and \"internal fever.\" Patient also reports cough. Patient states she has been taking Tylenol and ibuprofen without improvement. ) and Cough    HPI   Acute Illness  Acute illness concerns:   Onset/Duration: 3days  Symptoms:  Fever: YES- subjective  Chills/Sweats: No  Headache (location?): YES, dizziness, not herself  Sinus Pressure: No  Conjunctivitis:  No  Ear Pain: no  Rhinorrhea: YES  Congestion: YES  Sore Throat: No  Cough: YES-productive of yellow sputum  Wheeze: No  Decreased Appetite: No  Nausea: No  Vomiting: No  Diarrhea: No  Dysuria/Freq.: No  Dysuria or Hematuria: No  Fatigue/Achiness: YES  Sick/Strep Exposure: YES- at home  Therapies tried and outcome: rest,fluids,tylenol,ibuprofen,robitussin with minimal relief    Patient Active Problem List   Diagnosis    CARDIOVASCULAR SCREENING; LDL GOAL LESS THAN 160    Migraine without aura    Hemifacial spasm, right    Acne vulgaris    Prurigo nodularis    Chronic right hip pain     Current Outpatient Medications   Medication Sig Dispense Refill    acetaminophen (TYLENOL) 500 MG tablet Take 500-1,000 mg by mouth every 6 hours as needed       No current facility-administered medications for this visit.      No Known Allergies    Review of Systems   Constitutional:  Positive for fatigue and fever. Negative for chills.   HENT:  Positive for congestion and rhinorrhea. Negative for ear pain, sinus pressure, sinus pain and sore throat.    Respiratory:  Positive for cough. Negative for shortness of breath and wheezing.    Cardiovascular: Negative.  Negative for chest pain, palpitations and leg swelling.   Gastrointestinal:  Negative for diarrhea, nausea and vomiting.   Musculoskeletal:  Positive for arthralgias.   Neurological:  Positive for dizziness and headaches.   All other systems reviewed and are negative.          Objective    BP " 120/76 (BP Location: Left arm, Patient Position: Sitting, Cuff Size: Adult Large)   Pulse 83   Temp 98.6  F (37  C) (Tympanic)   Resp 16   Wt 89.6 kg (197 lb 8 oz)   LMP 11/17/2020 (Exact Date)   SpO2 97%   BMI 33.38 kg/m    Body mass index is 33.38 kg/m .  Physical Exam  Vitals and nursing note reviewed.   Constitutional:       General: She is not in acute distress.     Appearance: Normal appearance. She is well-developed and normal weight. She is not ill-appearing.   HENT:      Head: Normocephalic and atraumatic.      Comments: TMs are intact without any erythema or bulging bilaterally.  Airway is patent.     Nose: Nose normal.      Mouth/Throat:      Lips: Pink.      Mouth: Mucous membranes are moist.      Pharynx: Oropharynx is clear. Uvula midline. No pharyngeal swelling, oropharyngeal exudate or posterior oropharyngeal erythema.      Tonsils: No tonsillar exudate or tonsillar abscesses.   Eyes:      General: No scleral icterus.     Extraocular Movements: Extraocular movements intact.      Conjunctiva/sclera: Conjunctivae normal.      Pupils: Pupils are equal, round, and reactive to light.   Neck:      Thyroid: No thyromegaly.   Cardiovascular:      Rate and Rhythm: Normal rate and regular rhythm.      Pulses: Normal pulses.      Heart sounds: Normal heart sounds, S1 normal and S2 normal. No murmur heard.     No friction rub. No gallop.   Pulmonary:      Effort: Pulmonary effort is normal. No accessory muscle usage, respiratory distress or retractions.      Breath sounds: Normal breath sounds and air entry. No stridor. No decreased breath sounds, wheezing, rhonchi or rales.   Musculoskeletal:      Cervical back: Normal range of motion and neck supple.   Lymphadenopathy:      Cervical: No cervical adenopathy.   Skin:     General: Skin is warm and dry.      Nails: There is no clubbing.   Neurological:      Mental Status: She is alert and oriented to person, place, and time.   Psychiatric:         Mood and  Affect: Mood normal.         Behavior: Behavior normal.         Thought Content: Thought content normal.         Judgment: Judgment normal.        No results found for this or any previous visit (from the past 24 hour(s)).    CXR PA/lateral:  Mild cardiomegaly.  No infiltrates, effusions or pneumothorax.  No suspicious nodules or lesions. No fractures.  Per my read.   Will send for overread.      Assessment/Plan:  Acute bronchitis, unspecified organism:  CXR was negative for pneumonia, will send for covid test as well.  Will treat with zithromax X5days and tessalon perles as needed for symptoms.  Recommend treatment with rest, fluids and chicken soup. Tylenol/ibuprofen prn fever/pain.  Recheck in clinic if symptoms worsen or if symptoms do not improve.  To the ER if she develops hemoptysis, chest pain, fevers>102, worsening shortness of breath/wheezing.    -     XR Chest 2 Views  -     Symptomatic COVID-19 Virus (Coronavirus) by PCR Nose  -     benzonatate (TESSALON) 200 MG capsule; Take 1 capsule (200 mg) by mouth 3 times daily as needed for cough  -     azithromycin (ZITHROMAX) 250 MG tablet; 2 tablets the first day, then 1 tablet daily for the next 4 days        Lety See GLADYS Good

## 2024-08-08 ENCOUNTER — TELEPHONE (OUTPATIENT)
Dept: NURSING | Facility: CLINIC | Age: 39
End: 2024-08-08
Payer: COMMERCIAL

## 2024-08-08 NOTE — TELEPHONE ENCOUNTER
Patient classified as COVID treatment eligible by Epic high risk algorithm:  Yes    Coronavirus (COVID-19) Notification    Reason for call  Notify of POSITIVE COVID-19 lab result, assess symptoms,  review [facility] recommendations    Lab Result   Lab test for 2019-nCoV rRt-PCR or SARS-COV-2 PCR  Oropharyngeal AND/OR nasopharyngeal swabs were POSITIVE for 2019-nCoV RNA [OR] SARS-COV-2 RNA (COVID-19) RNA     We have been unable to reach patient by phone at this time to notify of their Positive COVID-19 result.    Left voicemail message requesting a call back to [phone] [facility] for results.        A Positive COVID-19 letter will be sent via [facility] or the mail.    Lori Strange

## 2024-10-28 ENCOUNTER — NURSE TRIAGE (OUTPATIENT)
Dept: FAMILY MEDICINE | Facility: CLINIC | Age: 39
End: 2024-10-28
Payer: COMMERCIAL

## 2024-10-28 NOTE — TELEPHONE ENCOUNTER
Pt called nurse line for weakness and fatigue. Pt states she has not been the same since infected with Covid end of August to early September.   Pt reports a lingering cough and moderate weakness that interference with daily duties. Pt has noticed an overall lack of energy, but she has been staying hydrated/eating and reports no other major concerns.     Per protocol, See in Office Today recommended however pt declines. Pt wants PCP visit on a day she is off work. Pt states she is able to manage her symptoms at home for now. In person visit scheduled 10/31 per pt request.    Educated pt that if weakness/fatigue gets worse prior to PCP visit, pt should visit urgent care or her nearest emergency department. Clinic number also provided.     Pt verbalized understanding and no further questions.    Kaylah Griggs RN on 10/28/2024 at 3:14 PM       Reason for Disposition   MODERATE weakness (i.e., interferes with work, school, normal activities) and persists > 3 days    Additional Information   Negative: SEVERE difficulty breathing (e.g., struggling for each breath, speaks in single words)   Negative: Shock suspected (e.g., cold/pale/clammy skin, too weak to stand, low BP, rapid pulse)   Negative: Difficult to awaken or acting confused (e.g., disoriented, slurred speech)   Negative: Fainted > 15 minutes ago and still feels too weak or dizzy to stand   Negative: SEVERE weakness (i.e., unable to walk or barely able to walk, requires support) and new-onset or worsening   Negative: Sounds like a life-threatening emergency to the triager   Negative: Weakness of the face, arm or leg on one side of the body   Negative: Has diabetes mellitus and weakness from low blood sugar (i.e., < 60 mg/dL or 3.5 mmol/L)   Negative: Recent heat exposure, suspected cause of weakness   Negative: Vomiting is main symptom   Negative: Diarrhea is main symptom   Negative: Difficulty breathing   Negative: Heart beating < 50 beats per minute OR > 140  "beats per minute   Negative: Extra heartbeats, irregular heart beating, or heart is beating very fast (i.e., 'palpitations')   Negative: Follows large amount of bleeding (e.g., from vomiting, rectum, vagina) (Exception: Small transient weakness from sight of a small amount blood.)   Negative: Black or tarry bowel movements   Negative: MODERATE weakness or fatigue from poor fluid intake with no improvement after 2 hours of rest and fluids   Negative: Drinking very little and dehydration suspected (e.g., no urine > 12 hours, very dry mouth, very lightheaded)   Negative: Patient sounds very sick or weak to the triager   Negative: MODERATE weakness (i.e., interferes with work, school, normal activities) and cause unknown (Exceptions: Weakness from acute minor illness or from poor fluid intake; weakness is chronic and not worse.)   Negative: Fever > 103 F  (39.4 C) and not able to get the Fever down using CARE ADVICE   Negative: Fever > 100.0 F (37.8 C) and bedridden (e.g., CVA, chronic illness, recovering from surgery)   Negative: Fever > 101 F (38.3 C) and over 60 years of age   Negative: Fever > 100.0 F (37.8 C) and diabetes mellitus or weak immune system (e.g., HIV positive, cancer chemo, splenectomy, organ transplant, chronic steroids)   Negative: Pale skin (pallor)    Answer Assessment - Initial Assessment Questions  1. DESCRIPTION: \"Describe how you are feeling.\"      Pt states feeling \"pretty tired\" and is operating at 40-50% of baseline.     2. SEVERITY: \"How bad is it?\"  \"Can you stand and walk?\"    - MILD (0-3): Feels weak or tired, but does not interfere with work, school or normal activities.    - MODERATE (4-7): Able to stand and walk; weakness interferes with work, school, or normal activities.    - SEVERE (8-10): Unable to stand or walk; unable to do usual activities.      Moderate, interference with daily duties. Pt states she is not as productive at work, and is slower with her steps.     3. ONSET: " "\"When did these symptoms begin?\" (e.g., hours, days, weeks, months)      Since she got Covid end of Aug to early September.     4. CAUSE: \"What do you think is causing the weakness or fatigue?\" (e.g., not drinking enough fluids, medical problem, trouble sleeping)      Pt states is staying hydrated, however has sleep troubles baseline as works night shifts.     5. NEW MEDICINES:  \"Have you started on any new medicines recently?\" (e.g., opioid pain medicines, benzodiazepines, muscle relaxants, antidepressants, antihistamines, neuroleptics, beta blockers)      Started Mag complex with d3 & zinc supplement a week ago.     6. OTHER SYMPTOMS: \"Do you have any other symptoms?\" (e.g., chest pain, fever, cough, SOB, vomiting, diarrhea, bleeding, other areas of pain)      Cough which lingered from Covid infection.    7. PREGNANCY: \"Is there any chance you are pregnant?\" \"When was your last menstrual period?\"      Last week.    Protocols used: Weakness (Generalized) and Fatigue-A-OH    "

## 2025-02-06 PROBLEM — G89.29 CHRONIC RIGHT HIP PAIN: Status: RESOLVED | Noted: 2024-05-06 | Resolved: 2025-02-06

## 2025-02-06 PROBLEM — M25.551 CHRONIC RIGHT HIP PAIN: Status: RESOLVED | Noted: 2024-05-06 | Resolved: 2025-02-06

## 2025-03-17 ENCOUNTER — PATIENT OUTREACH (OUTPATIENT)
Dept: CARE COORDINATION | Facility: CLINIC | Age: 40
End: 2025-03-17
Payer: COMMERCIAL

## 2025-03-31 ENCOUNTER — PATIENT OUTREACH (OUTPATIENT)
Dept: CARE COORDINATION | Facility: CLINIC | Age: 40
End: 2025-03-31
Payer: COMMERCIAL

## 2025-05-28 ENCOUNTER — TELEPHONE (OUTPATIENT)
Dept: OBGYN | Facility: CLINIC | Age: 40
End: 2025-05-28
Payer: COMMERCIAL

## 2025-05-28 NOTE — TELEPHONE ENCOUNTER
"----- Message from Te Jordan sent at 5/27/2025  5:15 PM CDT -----  Please reach out to patient about her upcoming appointment with me. Reason for visit is listed as \"concern about my umbilical hernia\". This should be followed by general surgery, not GYN.   Dr Jordan  "

## 2025-05-28 NOTE — TELEPHONE ENCOUNTER
Conacted pt via phone since she has not yet read or responded to the [facility] message.  I explained to her that she is scheduled with Dr. Jordan on 6/2 for her concerns for an umbilical hernia and Dr. Jordan is an OB/GYN provider and does not manage hernias.  I advised that she be seen by her PCP, or family practice for further evaluation and then they can refer her to general surgery as needed.    Pt is okay with me canceling her appt with Dr. Jordan and will reach out to her PCP.    Kassandra Back, RN- OB/GYN group

## 2025-06-03 ENCOUNTER — OFFICE VISIT (OUTPATIENT)
Dept: FAMILY MEDICINE | Facility: CLINIC | Age: 40
End: 2025-06-03
Payer: COMMERCIAL

## 2025-06-03 VITALS
RESPIRATION RATE: 20 BRPM | BODY MASS INDEX: 33.82 KG/M2 | WEIGHT: 203 LBS | SYSTOLIC BLOOD PRESSURE: 117 MMHG | HEIGHT: 65 IN | HEART RATE: 66 BPM | TEMPERATURE: 98.3 F | DIASTOLIC BLOOD PRESSURE: 72 MMHG | OXYGEN SATURATION: 100 %

## 2025-06-03 DIAGNOSIS — Z11.3 SCREEN FOR STD (SEXUALLY TRANSMITTED DISEASE): ICD-10-CM

## 2025-06-03 DIAGNOSIS — E66.09 CLASS 1 OBESITY DUE TO EXCESS CALORIES WITHOUT SERIOUS COMORBIDITY WITH BODY MASS INDEX (BMI) OF 34.0 TO 34.9 IN ADULT: ICD-10-CM

## 2025-06-03 DIAGNOSIS — R73.03 PREDIABETES: ICD-10-CM

## 2025-06-03 DIAGNOSIS — E66.811 CLASS 1 OBESITY DUE TO EXCESS CALORIES WITHOUT SERIOUS COMORBIDITY WITH BODY MASS INDEX (BMI) OF 34.0 TO 34.9 IN ADULT: ICD-10-CM

## 2025-06-03 DIAGNOSIS — Z12.31 VISIT FOR SCREENING MAMMOGRAM: ICD-10-CM

## 2025-06-03 DIAGNOSIS — Z00.00 ROUTINE GENERAL MEDICAL EXAMINATION AT A HEALTH CARE FACILITY: Primary | ICD-10-CM

## 2025-06-03 DIAGNOSIS — Z13.220 SCREENING FOR HYPERLIPIDEMIA: ICD-10-CM

## 2025-06-03 LAB
ERYTHROCYTE [DISTWIDTH] IN BLOOD BY AUTOMATED COUNT: 14.1 % (ref 10–15)
EST. AVERAGE GLUCOSE BLD GHB EST-MCNC: 120 MG/DL
HBA1C MFR BLD: 5.8 % (ref 0–5.6)
HCT VFR BLD AUTO: 39.4 % (ref 35–47)
HGB BLD-MCNC: 12.8 G/DL (ref 11.7–15.7)
MCH RBC QN AUTO: 27.9 PG (ref 26.5–33)
MCHC RBC AUTO-ENTMCNC: 32.5 G/DL (ref 31.5–36.5)
MCV RBC AUTO: 86 FL (ref 78–100)
PLATELET # BLD AUTO: 228 10E3/UL (ref 150–450)
RBC # BLD AUTO: 4.59 10E6/UL (ref 3.8–5.2)
WBC # BLD AUTO: 6.1 10E3/UL (ref 4–11)

## 2025-06-03 PROCEDURE — 87591 N.GONORRHOEAE DNA AMP PROB: CPT | Performed by: FAMILY MEDICINE

## 2025-06-03 PROCEDURE — 3074F SYST BP LT 130 MM HG: CPT | Performed by: FAMILY MEDICINE

## 2025-06-03 PROCEDURE — 87491 CHLMYD TRACH DNA AMP PROBE: CPT | Performed by: FAMILY MEDICINE

## 2025-06-03 PROCEDURE — 86780 TREPONEMA PALLIDUM: CPT | Performed by: FAMILY MEDICINE

## 2025-06-03 PROCEDURE — 85027 COMPLETE CBC AUTOMATED: CPT | Performed by: FAMILY MEDICINE

## 2025-06-03 PROCEDURE — 3078F DIAST BP <80 MM HG: CPT | Performed by: FAMILY MEDICINE

## 2025-06-03 PROCEDURE — 1126F AMNT PAIN NOTED NONE PRSNT: CPT | Performed by: FAMILY MEDICINE

## 2025-06-03 PROCEDURE — 80053 COMPREHEN METABOLIC PANEL: CPT | Performed by: FAMILY MEDICINE

## 2025-06-03 PROCEDURE — 82570 ASSAY OF URINE CREATININE: CPT | Performed by: FAMILY MEDICINE

## 2025-06-03 PROCEDURE — 99396 PREV VISIT EST AGE 40-64: CPT | Performed by: FAMILY MEDICINE

## 2025-06-03 PROCEDURE — 82043 UR ALBUMIN QUANTITATIVE: CPT | Performed by: FAMILY MEDICINE

## 2025-06-03 PROCEDURE — 86803 HEPATITIS C AB TEST: CPT | Performed by: FAMILY MEDICINE

## 2025-06-03 PROCEDURE — 87340 HEPATITIS B SURFACE AG IA: CPT | Performed by: FAMILY MEDICINE

## 2025-06-03 PROCEDURE — 80061 LIPID PANEL: CPT | Performed by: FAMILY MEDICINE

## 2025-06-03 PROCEDURE — 87389 HIV-1 AG W/HIV-1&-2 AB AG IA: CPT | Performed by: FAMILY MEDICINE

## 2025-06-03 PROCEDURE — 83036 HEMOGLOBIN GLYCOSYLATED A1C: CPT | Performed by: FAMILY MEDICINE

## 2025-06-03 PROCEDURE — 36415 COLL VENOUS BLD VENIPUNCTURE: CPT | Performed by: FAMILY MEDICINE

## 2025-06-03 SDOH — HEALTH STABILITY: PHYSICAL HEALTH: ON AVERAGE, HOW MANY DAYS PER WEEK DO YOU ENGAGE IN MODERATE TO STRENUOUS EXERCISE (LIKE A BRISK WALK)?: 5 DAYS

## 2025-06-03 SDOH — HEALTH STABILITY: PHYSICAL HEALTH: ON AVERAGE, HOW MANY MINUTES DO YOU ENGAGE IN EXERCISE AT THIS LEVEL?: 150+ MIN

## 2025-06-03 ASSESSMENT — PAIN SCALES - GENERAL: PAINLEVEL_OUTOF10: NO PAIN (0)

## 2025-06-03 ASSESSMENT — SOCIAL DETERMINANTS OF HEALTH (SDOH): HOW OFTEN DO YOU GET TOGETHER WITH FRIENDS OR RELATIVES?: ONCE A WEEK

## 2025-06-03 NOTE — PROGRESS NOTES
Preventive Care Visit  [facility]  Corinnecarlinejemima Howe MD, Family Medicine  Yunier 3, 2025      Assessment & Plan     Routine general medical examination at a health care facility    - CBC with platelets; Future  - Comprehensive metabolic panel (BMP + Alb, Alk Phos, ALT, AST, Total. Bili, TP); Future  - Hemoglobin A1c; Future  - Lipid panel reflex to direct LDL Non-fasting; Future  - CBC with platelets  - Comprehensive metabolic panel (BMP + Alb, Alk Phos, ALT, AST, Total. Bili, TP)  - Hemoglobin A1c  - Lipid panel reflex to direct LDL Non-fasting    Visit for screening mammogram  - Mammogram scheduled.    Prediabetes    - Hemoglobin A1c; Future  - Albumin Random Urine Quantitative with Creat Ratio; Future  - Hemoglobin A1c  - Albumin Random Urine Quantitative with Creat Ratio    Screening for hyperlipidemia    - Lipid panel reflex to direct LDL Non-fasting; Future  - Lipid panel reflex to direct LDL Non-fasting    Class 1 obesity due to excess calories without serious comorbidity with body mass index (BMI) of 34.0 to 34.9 in adult  -Ebony preferred GLP-1 initially.  Briefly discussed pros and cons of Wegovy and she denied.  - She preferred to work on lifestyle and healthy diet habits.  - Nutrition referral offered but patient denied.  She will let me know if she changes her mind.    Screen for STD (sexually transmitted disease)  -Per patient preference.  -History of tubal ligation, no need for pregnancy test    - HIV Antigen Antibody Combo; Future  - Hepatitis B surface antigen; Future  - Hepatitis C antibody; Future  - Treponema Abs w Reflex to RPR and Titer; Future  - Chlamydia trachomatis/Neisseria gonorrhoeae by PCR - Clinic Collect  - HIV Antigen Antibody Combo  - Hepatitis B surface antigen  - Hepatitis C antibody  - Treponema Abs w Reflex to RPR and Titer    Patient has been advised of split billing requirements and indicates understanding: Yes        BMI  Estimated  "body mass index is 34.31 kg/m  as calculated from the following:    Height as of this encounter: 1.638 m (5' 4.5\").    Weight as of this encounter: 92.1 kg (203 lb).   Weight management plan: Discussed healthy diet and exercise guidelines    Counseling  Appropriate preventive services were addressed with this patient via screening, questionnaire, or discussion as appropriate for fall prevention, nutrition, physical activity, Tobacco-use cessation, social engagement, weight loss and cognition.  Checklist reviewing preventive services available has been given to the patient.  Reviewed patient's diet, addressing concerns and/or questions.       Follow-up    Follow-up Visit   Expected date:  Jun 03, 2026 (Approximate)      Follow Up Appointment Details:     Follow-up with whom?: PCP    Follow-Up for what?: Adult Preventive    How?: In Person                 Vinicio Beck is a 40 year old, presenting for the following:  Physical        6/3/2025     1:23 PM   Additional Questions   Roomed by Yannick   Accompanied by Self          HPI           Advance Care Planning            6/3/2025   General Health   How would you rate your overall physical health? (!) FAIR   Feel stress (tense, anxious, or unable to sleep) Only a little   (!) STRESS CONCERN      6/3/2025   Nutrition   Three or more servings of calcium each day? Yes   Diet: Regular (no restrictions)   How many servings of fruit and vegetables per day? 4 or more   How many sweetened beverages each day? (!) 2         6/3/2025   Exercise   Days per week of moderate/strenous exercise 5 days   Average minutes spent exercising at this level 150+ min         6/3/2025   Social Factors   Frequency of gathering with friends or relatives Once a week   Worry food won't last until get money to buy more No   Food not last or not have enough money for food? No   Do you have housing? (Housing is defined as stable permanent housing and does not include staying outside in a car, in " a tent, in an abandoned building, in an overnight shelter, or couch-surfing.) Yes   Are you worried about losing your housing? No   Lack of transportation? No   Unable to get utilities (heat,electricity)? No         6/3/2025   Dental   Dentist two times every year? Yes         Today's PHQ-2 Score:       6/3/2025     1:16 PM   PHQ-2 ( 1999 [employer])   Q1: Little interest or pleasure in doing things 1   Q2: Feeling down, depressed or hopeless 0   PHQ-2 Score 1    Q1: Little interest or pleasure in doing things Several days   Q2: Feeling down, depressed or hopeless Not at all   PHQ-2 Score 1       Patient-reported           6/3/2025   Substance Use   Alcohol more than 3/day or more than 7/wk No   Do you use any other substances recreationally? No     Social History     Tobacco Use    Smoking status: Never     Passive exposure: Never    Smokeless tobacco: Never   Vaping Use    Vaping status: Never Used   Substance Use Topics    Alcohol use: No     Alcohol/week: 0.0 standard drinks of alcohol     Comment:  0-2  wine  glasses monthly, not in PG    Drug use: No                  6/3/2025   STI Screening   New sexual partner(s) since last STI/HIV test? (!) YES      History of abnormal Pap smear:         Latest Ref Rng & Units 7/18/2022     4:46 PM 3/20/2019     4:25 PM 3/20/2019     3:00 PM   PAP / HPV   PAP  Negative for Intraepithelial Lesion or Malignancy (NILM)      PAP (Historical)   NIL     HPV 16 DNA Negative Negative   Negative    HPV 18 DNA Negative Negative   Negative    Other HR HPV Negative Negative   Negative      ASCVD Risk   The ASCVD Risk score (Joe GUEVARA, et al., 2019) failed to calculate for the following reasons:    The valid total cholesterol range is 130 to 320 mg/dL       Reviewed and updated as needed this visit by Provider                          Review of Systems  Constitutional, HEENT, cardiovascular, pulmonary, gi and gu systems are negative, except as otherwise noted.     Objective   "  Exam  /72 (BP Location: Left arm, Patient Position: Chair, Cuff Size: Adult Large)   Pulse 66   Temp 98.3  F (36.8  C) (Temporal)   Resp 20   Ht 1.638 m (5' 4.5\")   Wt 92.1 kg (203 lb)   LMP 11/17/2020 (Exact Date)   SpO2 100%   BMI 34.31 kg/m     Estimated body mass index is 34.31 kg/m  as calculated from the following:    Height as of this encounter: 1.638 m (5' 4.5\").    Weight as of this encounter: 92.1 kg (203 lb).    Physical Exam  GENERAL: alert and no distress  NECK: no adenopathy, no asymmetry, masses, or scars  RESP: lungs clear to auscultation - no rales, rhonchi or wheezes  CV: regular rate and rhythm, normal S1 S2, no S3 or S4, no murmur, click or rub, no peripheral edema  ABDOMEN: soft, nontender, no hepatosplenomegaly, no masses and bowel sounds normal  MS: no gross musculoskeletal defects noted, no edema        Signed Electronically by: Dmitry Howe MD    "

## 2025-06-03 NOTE — PATIENT INSTRUCTIONS
At [facility], we strive to deliver an exceptional experience to you, every time we see you. If you receive a survey, please let us know what we are doing well and/or what we could improve upon, as we do value your feedback.  If you have MyChart, you can expect to receive results automatically within 24 hours of their completion.  Your provider will send a note interpreting your results as well.   If you do not have MyChart, you should receive your results in about a week by mail.    Your care team:                            Family Medicine Internal Medicine   MD Tony Stovall, MD Desire Huston, MD Ravi Mercado, MD Tracee Severino, PA-C Vilma Copeland APRWILLIAM, IRIS Vora, MD Pediatrics   MD Cielo Jordna, MD Landy Bui, PAMARTINEZ Wise APRN CNP   Dmitry Howe, MD Tamiko Begum, MD Hoda Wise, CNP      Same-Day Provider (No follow-up visits)    GLADYS Chand PA-C     Clinic hours: Monday - Thursday 7 am-6 pm; Fridays 7 am-5 pm.   Urgent care: Monday - Friday 10 am- 8 pm; Saturday and Sunday 9 am-5 pm.    Clinic: [phone]       [facility]: Monday - Thursday 8 am - 7 pm; Friday 8 am - 6 pm  [facility]: [phone]     [facility] Scheduling: Monday - Friday 7 am - 7 pm; Saturday 7 am - 3:30 pm  ([phone]) [place] : [phone]    Patient Education   Preventive Care Advice   This is general advice given by our system to help you stay healthy. However, your care team may have specific advice just for you. Please talk to your care team about your preventive care needs.  Nutrition  Eat 5 or more servings of fruits and vegetables each day.  Try wheat bread, brown rice and whole grain pasta (instead of white bread, rice, and pasta).  Get enough calcium and vitamin D. Check the label on foods and aim for  100% of the RDA (recommended daily allowance).  Lifestyle  Exercise at least 150 minutes each week  (30 minutes a day, 5 days a week).  Do muscle strengthening activities 2 days a week. These help control your weight and prevent disease.  No smoking.  Wear sunscreen to prevent skin cancer.  Have a dental exam and cleaning every 6 months.  Yearly exams  See your health care team every year to talk about:  Any changes in your health.  Any medicines your care team has prescribed.  Preventive care, family planning, and ways to prevent chronic diseases.  Shots (vaccines)   HPV shots (up to age 26), if you've never had them before.  Hepatitis B shots (up to age 59), if you've never had them before.  COVID-19 shot: Get this shot when it's due.  Flu shot: Get a flu shot every year.  Tetanus shot: Get a tetanus shot every 10 years.  Pneumococcal, hepatitis A, and RSV shots: Ask your care team if you need these based on your risk.  Shingles shot (for age 50 and up)  General health tests  Diabetes screening:  Starting at age 35, Get screened for diabetes at least every 3 years.  If you are younger than age 35, ask your care team if you should be screened for diabetes.  Cholesterol test: At age 39, start having a cholesterol test every 5 years, or more often if advised.  Bone density scan (DEXA): At age 50, ask your care team if you should have this scan for osteoporosis (brittle bones).  Hepatitis C: Get tested at least once in your life.  STIs (sexually transmitted infections)  Before age 24: Ask your care team if you should be screened for STIs.  After age 24: Get screened for STIs if you're at risk. You are at risk for STIs (including HIV) if:  You are sexually active with more than one person.  You don't use condoms every time.  You or a partner was diagnosed with a sexually transmitted infection.  If you are at risk for HIV, ask about PrEP medicine to prevent HIV.  Get tested for HIV at least once in your life, whether  you are at risk for HIV or not.  Cancer screening tests  Cervical cancer screening: If you have a cervix, begin getting regular cervical cancer screening tests starting at age 21.  Breast cancer scan (mammogram): If you've ever had breasts, begin having regular mammograms starting at age 40. This is a scan to check for breast cancer.  Colon cancer screening: It is important to start screening for colon cancer at age 45.  Have a colonoscopy test every 10 years (or more often if you're at risk) Or, ask your provider about stool tests like a FIT test every year or Cologuard test every 3 years.  To learn more about your testing options, visit:   .  For help making a decision, visit:   https://bit.ly/vt47180.  Prostate cancer screening test: If you have a prostate, ask your care team if a prostate cancer screening test (PSA) at age 55 is right for you.  Lung cancer screening: If you are a current or former smoker ages 50 to 80, ask your care team if ongoing lung cancer screenings are right for you.  For informational purposes only. Not to replace the advice of your health care provider. Copyright   2023 [employer]. All rights reserved. Clinically reviewed by the [facility] Transitions Program. TigerText 045332 - REV 01/24.

## 2025-06-04 LAB
ALBUMIN SERPL BCG-MCNC: 4.4 G/DL (ref 3.5–5.2)
ALP SERPL-CCNC: 55 U/L (ref 40–150)
ALT SERPL W P-5'-P-CCNC: 17 U/L (ref 0–50)
ANION GAP SERPL CALCULATED.3IONS-SCNC: 9 MMOL/L (ref 7–15)
AST SERPL W P-5'-P-CCNC: 24 U/L (ref 0–45)
BILIRUB SERPL-MCNC: 0.3 MG/DL
BUN SERPL-MCNC: 11.7 MG/DL (ref 6–20)
C TRACH DNA SPEC QL PROBE+SIG AMP: NEGATIVE
CALCIUM SERPL-MCNC: 9.7 MG/DL (ref 8.8–10.4)
CHLORIDE SERPL-SCNC: 105 MMOL/L (ref 98–107)
CHOLEST SERPL-MCNC: 138 MG/DL
CREAT SERPL-MCNC: 0.66 MG/DL (ref 0.51–0.95)
CREAT UR-MCNC: 77.5 MG/DL
EGFRCR SERPLBLD CKD-EPI 2021: >90 ML/MIN/1.73M2
FASTING STATUS PATIENT QL REPORTED: NO
FASTING STATUS PATIENT QL REPORTED: NO
GLUCOSE SERPL-MCNC: 95 MG/DL (ref 70–99)
HBV SURFACE AG SERPL QL IA: NONREACTIVE
HCO3 SERPL-SCNC: 25 MMOL/L (ref 22–29)
HCV AB SERPL QL IA: NONREACTIVE
HDLC SERPL-MCNC: 42 MG/DL
HIV 1+2 AB+HIV1 P24 AG SERPL QL IA: NONREACTIVE
LDLC SERPL CALC-MCNC: 73 MG/DL
MICROALBUMIN UR-MCNC: <12 MG/L
MICROALBUMIN/CREAT UR: NORMAL MG/G{CREAT}
N GONORRHOEA DNA SPEC QL NAA+PROBE: NEGATIVE
NONHDLC SERPL-MCNC: 96 MG/DL
POTASSIUM SERPL-SCNC: 4.9 MMOL/L (ref 3.4–5.3)
PROT SERPL-MCNC: 7.6 G/DL (ref 6.4–8.3)
SODIUM SERPL-SCNC: 139 MMOL/L (ref 135–145)
SPECIMEN TYPE: NORMAL
T PALLIDUM AB SER QL: NONREACTIVE
TRIGL SERPL-MCNC: 114 MG/DL

## 2025-06-05 ENCOUNTER — RESULTS FOLLOW-UP (OUTPATIENT)
Dept: FAMILY MEDICINE | Facility: CLINIC | Age: 40
End: 2025-06-05

## 2025-07-29 ENCOUNTER — ANCILLARY PROCEDURE (OUTPATIENT)
Dept: MAMMOGRAPHY | Facility: CLINIC | Age: 40
End: 2025-07-29
Payer: COMMERCIAL

## 2025-07-29 DIAGNOSIS — Z12.31 VISIT FOR SCREENING MAMMOGRAM: ICD-10-CM

## 2025-07-29 PROCEDURE — 77063 BREAST TOMOSYNTHESIS BI: CPT | Mod: TC | Performed by: RADIOLOGY

## 2025-07-29 PROCEDURE — 77067 SCR MAMMO BI INCL CAD: CPT | Mod: TC | Performed by: RADIOLOGY
